# Patient Record
Sex: FEMALE | Race: WHITE | NOT HISPANIC OR LATINO | Employment: OTHER | URBAN - METROPOLITAN AREA
[De-identification: names, ages, dates, MRNs, and addresses within clinical notes are randomized per-mention and may not be internally consistent; named-entity substitution may affect disease eponyms.]

---

## 2017-01-10 ENCOUNTER — ALLSCRIPTS OFFICE VISIT (OUTPATIENT)
Dept: OTHER | Facility: OTHER | Age: 72
End: 2017-01-10

## 2017-01-10 DIAGNOSIS — E04.2 NONTOXIC MULTINODULAR GOITER: ICD-10-CM

## 2017-01-10 DIAGNOSIS — E78.5 HYPERLIPIDEMIA: ICD-10-CM

## 2017-01-10 DIAGNOSIS — Z12.31 ENCOUNTER FOR SCREENING MAMMOGRAM FOR MALIGNANT NEOPLASM OF BREAST: ICD-10-CM

## 2017-01-10 DIAGNOSIS — I10 ESSENTIAL (PRIMARY) HYPERTENSION: ICD-10-CM

## 2017-01-10 DIAGNOSIS — E87.6 HYPOKALEMIA: ICD-10-CM

## 2017-01-24 ENCOUNTER — APPOINTMENT (OUTPATIENT)
Dept: LAB | Age: 72
End: 2017-01-24
Payer: COMMERCIAL

## 2017-01-24 ENCOUNTER — HOSPITAL ENCOUNTER (OUTPATIENT)
Dept: RADIOLOGY | Age: 72
Discharge: HOME | End: 2017-01-24
Payer: COMMERCIAL

## 2017-01-24 ENCOUNTER — TRANSCRIBE ORDERS (OUTPATIENT)
Dept: ADMINISTRATIVE | Age: 72
End: 2017-01-24

## 2017-01-24 ENCOUNTER — GENERIC CONVERSION - ENCOUNTER (OUTPATIENT)
Dept: OTHER | Facility: OTHER | Age: 72
End: 2017-01-24

## 2017-01-24 DIAGNOSIS — E87.6 HYPOKALEMIA: ICD-10-CM

## 2017-01-24 DIAGNOSIS — Z12.31 ENCOUNTER FOR SCREENING MAMMOGRAM FOR MALIGNANT NEOPLASM OF BREAST: ICD-10-CM

## 2017-01-24 DIAGNOSIS — E04.2 NONTOXIC MULTINODULAR GOITER: ICD-10-CM

## 2017-01-24 DIAGNOSIS — E78.5 HYPERLIPIDEMIA: ICD-10-CM

## 2017-01-24 DIAGNOSIS — I10 ESSENTIAL (PRIMARY) HYPERTENSION: ICD-10-CM

## 2017-01-24 LAB
ALBUMIN SERPL BCP-MCNC: 3.7 G/DL (ref 3.5–5)
ALP SERPL-CCNC: 61 U/L (ref 46–116)
ALT SERPL W P-5'-P-CCNC: 26 U/L (ref 12–78)
ANION GAP SERPL CALCULATED.3IONS-SCNC: 10 MMOL/L (ref 4–13)
AST SERPL W P-5'-P-CCNC: 17 U/L (ref 5–45)
BILIRUB SERPL-MCNC: 0.45 MG/DL (ref 0.2–1)
BUN SERPL-MCNC: 17 MG/DL (ref 5–25)
CALCIUM SERPL-MCNC: 9.1 MG/DL (ref 8.3–10.1)
CHLORIDE SERPL-SCNC: 104 MMOL/L (ref 100–108)
CHOLEST SERPL-MCNC: 193 MG/DL (ref 50–200)
CO2 SERPL-SCNC: 26 MMOL/L (ref 21–32)
CREAT SERPL-MCNC: 1.22 MG/DL (ref 0.6–1.3)
GFR SERPL CREATININE-BSD FRML MDRD: 43.3 ML/MIN/1.73SQ M
GLUCOSE SERPL-MCNC: 91 MG/DL (ref 65–140)
HDLC SERPL-MCNC: 68 MG/DL (ref 40–60)
LDLC SERPL CALC-MCNC: 107 MG/DL (ref 0–100)
POTASSIUM SERPL-SCNC: 3.4 MMOL/L (ref 3.5–5.3)
PROT SERPL-MCNC: 7.1 G/DL (ref 6.4–8.2)
SODIUM SERPL-SCNC: 140 MMOL/L (ref 136–145)
TRIGL SERPL-MCNC: 92 MG/DL
TSH SERPL DL<=0.05 MIU/L-ACNC: 1.23 UIU/ML (ref 0.36–3.74)

## 2017-01-24 PROCEDURE — 80053 COMPREHEN METABOLIC PANEL: CPT

## 2017-01-24 PROCEDURE — 84443 ASSAY THYROID STIM HORMONE: CPT

## 2017-01-24 PROCEDURE — 36415 COLL VENOUS BLD VENIPUNCTURE: CPT

## 2017-01-24 PROCEDURE — 80061 LIPID PANEL: CPT

## 2017-01-24 PROCEDURE — G0202 SCR MAMMO BI INCL CAD: HCPCS

## 2017-03-21 ENCOUNTER — APPOINTMENT (EMERGENCY)
Dept: RADIOLOGY | Facility: HOSPITAL | Age: 72
End: 2017-03-21
Payer: COMMERCIAL

## 2017-03-21 ENCOUNTER — HOSPITAL ENCOUNTER (EMERGENCY)
Facility: HOSPITAL | Age: 72
Discharge: HOME | End: 2017-03-21
Attending: EMERGENCY MEDICINE
Payer: COMMERCIAL

## 2017-03-21 VITALS
RESPIRATION RATE: 18 BRPM | TEMPERATURE: 98.2 F | SYSTOLIC BLOOD PRESSURE: 145 MMHG | HEIGHT: 65 IN | OXYGEN SATURATION: 96 % | HEART RATE: 76 BPM | DIASTOLIC BLOOD PRESSURE: 65 MMHG | BODY MASS INDEX: 25.83 KG/M2 | WEIGHT: 155 LBS

## 2017-03-21 DIAGNOSIS — M71.21 BAKER'S CYST, RIGHT: Primary | ICD-10-CM

## 2017-03-21 DIAGNOSIS — M79.89 SWELLING OF RIGHT LOWER EXTREMITY: ICD-10-CM

## 2017-03-21 LAB
ANION GAP SERPL CALCULATED.3IONS-SCNC: 11 MMOL/L (ref 4–13)
APTT PPP: 27 SECONDS (ref 24–36)
BASOPHILS # BLD AUTO: 0.03 THOUSANDS/ΜL (ref 0–0.1)
BASOPHILS NFR BLD AUTO: 0 % (ref 0–1)
BUN SERPL-MCNC: 20 MG/DL (ref 5–25)
CALCIUM SERPL-MCNC: 10 MG/DL (ref 8.3–10.1)
CHLORIDE SERPL-SCNC: 106 MMOL/L (ref 100–108)
CO2 SERPL-SCNC: 24 MMOL/L (ref 21–32)
CREAT SERPL-MCNC: 1.11 MG/DL (ref 0.6–1.3)
EOSINOPHIL # BLD AUTO: 0.3 THOUSAND/ΜL (ref 0–0.61)
EOSINOPHIL NFR BLD AUTO: 3 % (ref 0–6)
ERYTHROCYTE [DISTWIDTH] IN BLOOD BY AUTOMATED COUNT: 14.3 % (ref 11.6–15.1)
GFR SERPL CREATININE-BSD FRML MDRD: 48.2 ML/MIN/1.73SQ M
GLUCOSE SERPL-MCNC: 101 MG/DL (ref 65–140)
HCT VFR BLD AUTO: 43.7 % (ref 34.8–46.1)
HGB BLD-MCNC: 14.7 G/DL (ref 11.5–15.4)
INR PPP: 0.88 (ref 0.86–1.16)
LYMPHOCYTES # BLD AUTO: 2.51 THOUSANDS/ΜL (ref 0.6–4.47)
LYMPHOCYTES NFR BLD AUTO: 28 % (ref 14–44)
MCH RBC QN AUTO: 27.4 PG (ref 26.8–34.3)
MCHC RBC AUTO-ENTMCNC: 33.6 G/DL (ref 31.4–37.4)
MCV RBC AUTO: 82 FL (ref 82–98)
MONOCYTES # BLD AUTO: 0.54 THOUSAND/ΜL (ref 0.17–1.22)
MONOCYTES NFR BLD AUTO: 6 % (ref 4–12)
NEUTROPHILS # BLD AUTO: 5.64 THOUSANDS/ΜL (ref 1.85–7.62)
NEUTS SEG NFR BLD AUTO: 63 % (ref 43–75)
NRBC BLD AUTO-RTO: 0 /100 WBCS
PLATELET # BLD AUTO: 222 THOUSANDS/UL (ref 149–390)
PMV BLD AUTO: 12.5 FL (ref 8.9–12.7)
POTASSIUM SERPL-SCNC: 4.3 MMOL/L (ref 3.5–5.3)
PROTHROMBIN TIME: 12.1 SECONDS (ref 12–14.3)
RBC # BLD AUTO: 5.36 MILLION/UL (ref 3.81–5.12)
SODIUM SERPL-SCNC: 141 MMOL/L (ref 136–145)
WBC # BLD AUTO: 9.05 THOUSAND/UL (ref 4.31–10.16)

## 2017-03-21 PROCEDURE — 99284 EMERGENCY DEPT VISIT MOD MDM: CPT

## 2017-03-21 PROCEDURE — 36415 COLL VENOUS BLD VENIPUNCTURE: CPT | Performed by: EMERGENCY MEDICINE

## 2017-03-21 PROCEDURE — 80048 BASIC METABOLIC PNL TOTAL CA: CPT | Performed by: EMERGENCY MEDICINE

## 2017-03-21 PROCEDURE — 85730 THROMBOPLASTIN TIME PARTIAL: CPT | Performed by: EMERGENCY MEDICINE

## 2017-03-21 PROCEDURE — 85610 PROTHROMBIN TIME: CPT | Performed by: EMERGENCY MEDICINE

## 2017-03-21 PROCEDURE — 93971 EXTREMITY STUDY: CPT

## 2017-03-21 PROCEDURE — 85025 COMPLETE CBC W/AUTO DIFF WBC: CPT | Performed by: EMERGENCY MEDICINE

## 2017-03-21 RX ORDER — LOSARTAN POTASSIUM AND HYDROCHLOROTHIAZIDE 25; 100 MG/1; MG/1
1 TABLET ORAL DAILY
COMMUNITY
End: 2018-10-20 | Stop reason: SDUPTHER

## 2017-03-21 RX ORDER — MONTELUKAST SODIUM 10 MG/1
10 TABLET ORAL
COMMUNITY
End: 2018-08-18 | Stop reason: SDUPTHER

## 2017-03-21 RX ORDER — AMLODIPINE BESYLATE 5 MG/1
5 TABLET ORAL DAILY
COMMUNITY
End: 2018-06-13

## 2017-03-21 RX ORDER — MOMETASONE FUROATE 50 UG/1
2 SPRAY, METERED NASAL DAILY
COMMUNITY
End: 2018-06-12 | Stop reason: SDUPTHER

## 2017-03-21 RX ORDER — PRAVASTATIN SODIUM 40 MG
40 TABLET ORAL DAILY
COMMUNITY
End: 2018-02-15 | Stop reason: SDUPTHER

## 2017-07-20 ENCOUNTER — ALLSCRIPTS OFFICE VISIT (OUTPATIENT)
Dept: OTHER | Facility: OTHER | Age: 72
End: 2017-07-20

## 2017-07-20 DIAGNOSIS — Z12.31 ENCOUNTER FOR SCREENING MAMMOGRAM FOR MALIGNANT NEOPLASM OF BREAST: ICD-10-CM

## 2017-08-17 ENCOUNTER — LAB REQUISITION (OUTPATIENT)
Dept: LAB | Facility: HOSPITAL | Age: 72
End: 2017-08-17
Payer: COMMERCIAL

## 2017-08-17 ENCOUNTER — GENERIC CONVERSION - ENCOUNTER (OUTPATIENT)
Dept: OTHER | Facility: OTHER | Age: 72
End: 2017-08-17

## 2017-08-17 DIAGNOSIS — C44.712 BASAL CELL CARCINOMA OF SKIN OF RIGHT LOWER LIMB, INCLUDING HIP: ICD-10-CM

## 2017-08-17 PROCEDURE — 88305 TISSUE EXAM BY PATHOLOGIST: CPT | Performed by: DERMATOLOGY

## 2017-10-04 ENCOUNTER — OFFICE VISIT (OUTPATIENT)
Dept: URGENT CARE | Age: 72
End: 2017-10-04
Payer: COMMERCIAL

## 2017-10-04 PROCEDURE — S9088 SERVICES PROVIDED IN URGENT: HCPCS | Performed by: FAMILY MEDICINE

## 2017-10-04 PROCEDURE — 99213 OFFICE O/P EST LOW 20 MIN: CPT | Performed by: FAMILY MEDICINE

## 2018-01-01 DIAGNOSIS — E04.2 NONTOXIC MULTINODULAR GOITER: ICD-10-CM

## 2018-01-01 DIAGNOSIS — E87.6 HYPOKALEMIA: ICD-10-CM

## 2018-01-01 DIAGNOSIS — I10 ESSENTIAL (PRIMARY) HYPERTENSION: ICD-10-CM

## 2018-01-01 DIAGNOSIS — E78.5 HYPERLIPIDEMIA: ICD-10-CM

## 2018-01-12 VITALS
WEIGHT: 157.01 LBS | BODY MASS INDEX: 26.81 KG/M2 | HEIGHT: 64 IN | RESPIRATION RATE: 18 BRPM | TEMPERATURE: 98.5 F | OXYGEN SATURATION: 98 % | DIASTOLIC BLOOD PRESSURE: 70 MMHG | SYSTOLIC BLOOD PRESSURE: 140 MMHG | HEART RATE: 70 BPM

## 2018-01-12 NOTE — RESULT NOTES
Message   Please call her that her potassium is slightly low as it has been in the past  The HCTZ she takes for her BP is what is doing this so I would like her to start potassium supplements  I will send it to the pharmacy  The rest of the blood tests looks good  Verified Results  (1) CBC/ PLT (NO DIFF) [id number] 08:18AM Joshua Pat     Test Name Result Flag Reference   HEMATOCRIT 41 8 %  34 8-46 1   HEMOGLOBIN 13 8 g/dL  11 5-15 4   MCHC 33 0 g/dL  31 4-37 4   MCH 26 9 pg  26 8-34 3   MCV 82 fL  82-98   PLATELET COUNT 543 Thousands/uL  149-390   RBC COUNT 5 13 Million/uL H 3 81-5 12   RDW 14 4 %  11 6-15 1   WBC COUNT 5 76 Thousand/uL  4 31-10 16   MPV 12 7 fL  8 9-12 7     (1) COMPREHENSIVE METABOLIC PANEL [id number] 93:41LS Joshua Pat     Test Name Result Flag Reference   GLUCOSE,RANDM 89 mg/dL     If the patient is fasting, the ADA then defines impaired fasting glucose as > 100 mg/dL and diabetes as > or equal to 123 mg/dL  SODIUM 139 mmol/L  136-145   POTASSIUM 3 4 mmol/L L 3 5-5 3   CHLORIDE 107 mmol/L  100-108   CARBON DIOXIDE 27 mmol/L  21-32   ANION GAP (CALC) 5 mmol/L  4-13   BLOOD UREA NITROGEN 18 mg/dL  5-25   CREATININE 1 09 mg/dL  0 60-1 30   Standardized to IDMS reference method   CALCIUM 9 2 mg/dL  8 3-10 1   BILI, TOTAL 0 66 mg/dL  0 20-1 00   ALK PHOSPHATAS 61 U/L     ALT (SGPT) 27 U/L  12-78   AST(SGOT) 19 U/L  5-45   ALBUMIN 3 7 g/dL  3 5-5 0   TOTAL PROTEIN 7 1 g/dL  6 4-8 2   eGFR Non-African American 49 3 ml/min/1 73sq m     [facility] [employer] Disease Education Program recommendations are as follows:  GFR calculation is accurate only with a steady state creatinine  Chronic Kidney disease less than 60 ml/min/1 73 sq  meters  Kidney failure less than 15 ml/min/1 73 sq  meters       (1) LIPID PANEL, FASTING 28Jun2016 08:18AM Joshua Pat     Test Name Result Flag Reference   CHOLESTEROL 191 mg/dL     HDL,DIRECT 60 mg/dL  40-60   Specimen collection should occur prior to Metamizole administration due to the potential for falsely depressed results  LDL CHOLESTEROL CALCULATED 113 mg/dL H 0-100   Triglyceride:         Normal              <150 mg/dl       Borderline High    150-199 mg/dl       High               200-499 mg/dl       Very High          >499 mg/dl  Cholesterol:         Desirable        <200 mg/dl      Borderline High  200-239 mg/dl      High             >239 mg/dl  HDL Cholesterol:        High    >59 mg/dL      Low     <41 mg/dL  LDL CALCULATED:    This screening LDL is a calculated result  It does not have the accuracy of the Direct Measured LDL in the monitoring of patients with hyperlipidemia and/or statin therapy  Direct Measure LDL (AAJ359) must be ordered separately in these patients  TRIGLYCERIDES 92 mg/dL  <=150   Specimen collection should occur prior to N-Acetylcysteine or Metamizole administration due to the potential for falsely depressed results         Plan  Hypokalemia    · Potassium Chloride ER 10 MEQ Oral Capsule Extended Release; TAKE 1  CAPSULE DAILY    Signatures   Electronically signed by : RUBIN Mcarthur ; Jun 30 2016  1:45PM EST                       (Author)

## 2018-01-13 ENCOUNTER — OFFICE VISIT (OUTPATIENT)
Dept: URGENT CARE | Age: 73
End: 2018-01-13
Payer: COMMERCIAL

## 2018-01-13 PROCEDURE — S9088 SERVICES PROVIDED IN URGENT: HCPCS | Performed by: PREVENTIVE MEDICINE

## 2018-01-13 PROCEDURE — 99213 OFFICE O/P EST LOW 20 MIN: CPT | Performed by: PREVENTIVE MEDICINE

## 2018-01-14 NOTE — PROGRESS NOTES
Assessment    1  Encounter for preventive health examination (V70 0) (Z00 00)    Plan  Benign essential hypertension, Hyperlipidemia, Hypokalemia, Non-toxic multinodular  goiter    · (1) CBC/PLT/DIFF; Status:Active; Requested ZIW:[id number];    · (1) COMPREHENSIVE METABOLIC PANEL; Status:Active; Requested QVL:60FPD1231;    · (1) LIPID PANEL FASTING W DIRECT LDL REFLEX; Status:Active; Requested  NYM:[id number];    · (1) TSH WITH FT4 REFLEX; Status:Active; Requested YSE:[id number]; Discussion/Summary  Impression: Subsequent Annual Wellness Visit, with preventive exam as well as age and risk appropriate counseling completed  Cardiovascular screening and counseling: screening is current  Diabetes screening and counseling: screening is current  Colorectal cancer screening and counseling: screening is current, due for a colonoscopy (low risk), colorectal cancer screening due every 10 year(s) and due 2019  Breast cancer screening and counseling: screening is current  Cervical cancer screening and counseling: screening not indicated  Osteoporosis screening and counseling: the risks and benefits of screening were discussed and the patient declines screening  Abdominal aortic aneurysm screening and counseling: screening not indicated  Glaucoma screening and counseling: screening is current  HIV screening and counseling: screening not indicated  Immunizations: the lifetime pneumococcal vaccine has been completed and Zostavax vaccination up to date  Advance Directive Planning: complete and up to date  Patient Discussion: plan discussed with the patient, follow-up visit needed in 6months  The treatment plan was reviewed with the patient/guardian  The patient/guardian understands and agrees with the treatment plan      Chief Complaint  Wellness      History of Present Illness  Welcome to Medicare and Wellness Visits: The patient is being seen for the subsequent annual wellness visit     Medicare Screening and Risk Factors   Hospitalizations: no previous hospitalizations  Once per lifetime medicare screening tests: ECG has not been done  Medicare Screening Tests Risk Questions   Abdominal aortic aneurysm risk assessment: none indicated  Osteoporosis risk assessment: , female gender and over 48years of age  HIV risk assessment: none indicated  Drug and Alcohol Use: The patient has never smoked cigarettes  The patient reports occasional alcohol use and drinking 3-4 drinks per week  Alcohol concern:   The patient has no concerns about alcohol abuse  She has never used illicit drugs  Diet and Physical Activity: Current diet includes well balanced meals  She exercises 2-3 times per week  Exercise: Pilates, treadmill 45 minutes per day  Mood Disorder and Cognitive Impairment Screening:   Depression screening  negative for symptoms  She denies feeling down, depressed, or hopeless over the past two weeks  She denies feeling little interest or pleasure in doing things over the past two weeks  Cognitive impairment screening: denies difficulty learning/retaining new information, denies difficulty handling complex tasks, denies difficulty with reasoning, denies difficulty with language and denies difficulty with behavior  Functional Ability/Level of Safety: Hearing is normal bilaterally and a hearing aid is not used  She denies hearing difficulties  The patient is currently able to do activities of daily living without limitations, able to do instrumental activities of daily living without limitations, able to participate in social activities without limitations and able to drive without limitations   Activities of daily living details: does not need help using the phone, no transportation help needed, does not need help shopping, no meal preparation help needed, does not need help doing housework, does not need help doing laundry, does not need help managing medications and does not need help managing money  Fall risk factors: The patient fell 0 times in the past 12 months , no polypharmacy, no alcohol use, no mobility impairment, no antidepressant use, no deconditioning, no postural hypotension, no sedative use, no visual impairment, no urinary incontinence, no antihypertensive use, no cognitive impairment, up and go test was normal and no previous fall  Home safety risk factors:  no loose rugs, no poor household lighting, no uneven floors, no household clutter, grab bars in the bathroom and handrails on the stairs  Advance Directives: Advance directives: living will, durable power of  for health care directives and advance directives  Co-Managers and Medical Equipment/Suppliers: See Patient Care Team      Patient Care Team    Care Team Member Role Specialty Office Number   Jake Luque MD  Surgical Oncology [phone]   Jodi Reyez MD  Pediatrics [phone]   Radha Sweet  Gastroenterology Adult [phone]   Roby Sanches MD  Optometry [phone]     Review of Systems    Constitutional: no fever and no chills  ENT: post nasal drip  Cardiovascular: no chest pain and no palpitations  Respiratory: no shortness of breath and no cough  Gastrointestinal: no abdominal pain, no vomiting and no diarrhea  Genitourinary: denies vaginal bleeding, but no dysuria and no urinary urgency  Musculoskeletal: no diffuse joint pain  Psychiatric: no insomnia, no anxiety and no depression  Active Problems    1  Advance directive discussed with patient (V65 49) (Z71 89)   2  Benign essential hypertension (401 1) (I10)   3  Eye problem (V41 1) (H57 9)   4  Flu vaccine need (V04 81) (Z23)   5  History of allergy (V15 09) (Z88 9)   6  Hyperlipidemia (272 4) (E78 5)   7  Hypokalemia (276 8) (E87 6)   8  Mild persistent asthma without complication (396 09) (S38 36)   9   Need for prophylactic vaccination or inoculation against diphtheria and tetanus (V06 5) (Z23)   10  Need for revaccination (V05 9) (Z23)   11  Non-toxic multinodular goiter (241 1) (E04 2)   12  Screening for genitourinary condition (V81 6) (Z13 89)   13  Urine frequency (788 41) (R35 0)   14  Visit for screening mammogram (V76 12) (Z12 31)    Past Medical History    · History of acute conjunctivitis (V12 49) (Z86 69)   · History of allergic rhinitis (V12 69) (Z87 09)   · History of hypokalemia (V12 29) (Z86 39)   · History of urinary tract infection (V13 02) (Z87 440)   · History of urinary tract infection (V13 02) (Z87 440)   · History of Screening for neurological condition (V80 09) (Z13 89)   · Urinary tract infection (599 0) (N39 0)   · History of Urinary tract infection (599 0) (N39 0)   · History of Urine troubles (V47 4) (R39 89)    The active problems and past medical history were reviewed and updated today  Surgical History    · History of Colposcopy   · History of Laparoscopy (Diagnostic)    The surgical history was reviewed and updated today  Family History  Mother    · Family history of Lung Cancer (V16 1)  Family History    · Family history of Asthma (V17 5)   · Family history of Coronary Artery Disease (V17 49)   · Family history of Thyroid Disorder (V18 19)    The family history was reviewed and updated today  Social History    · Being A Social Drinker   · Denied: History of Drug Use   · Former smoker (V15 82) (T30 860)   · Viru 64   · Marital History - Currently   The social history was reviewed and updated today  Current Meds   1  Advair Diskus 100-50 MCG/DOSE Inhalation Aerosol Powder Breath Activated; INHALE   1 PUFF DAILY; Therapy: 84NJW8241 to (Evaluate:10Nov2017)  Requested for: 08KVN4006; Last   Rx:61Gef6692 Ordered   2  AmLODIPine Besylate 5 MG Oral Tablet; take 1 tablet every day; Therapy: 42MUB9212 to (Evaluate:10Jan2018)  Requested for: 06FAZ8745; Last   Rx:15Jan2017 Ordered   3  Calcium 600+D 600-400 MG-UNIT Oral Tablet;  Take one tablet daily; Therapy: (Recorded:11Uyi4150) to Recorded   4  Claritin 10 MG Oral Tablet; take 1 tablet daily as needed  Requested for: 19VMQ1316;   Last Rx:26Mam8149 Ordered   5  Fluticasone Propionate 50 MCG/ACT Nasal Suspension; use 2 sprays in each nostril   once daily; Therapy: 59CKQ6408 to (Evaluate:14Jan2018)  Requested for: 60Bdq0708; Last   Rx:52Ncj9192 Ordered   6  Losartan Potassium-HCTZ 100-25 MG Oral Tablet; TAKE 1 TABLET DAILY; Therapy: 90UAH4776 to (Oscar Oneill)  Requested for: 53HGF2375; Last   Rx:57Czv1354 Ordered   7  Montelukast Sodium 10 MG Oral Tablet; TAKE 1 TABLET AT BEDTIME; Therapy: 84YII7256 to (Evaluate:04Sep2017)  Requested for: 96HQW8355; Last   Rx:25Inf9554 Ordered   8  Potassium Chloride ER 20 MEQ Oral Tablet Extended Release; 1 tab PO once  daily; Therapy: 48QZJ8595 to (Last PC:69BOZ5020)  Requested for: 74NRB6046 Ordered   9  Pravastatin Sodium 40 MG Oral Tablet; take 1 tablet every day; Therapy: 96JEN2550 to (Evaluate:27Apr2018)  Requested for: 66WMZ8710; Last   Rx:79Sxw7409 Ordered   10  Tobramycin 0 3 % Ophthalmic Solution; instill three drops tid for five days; Therapy: 88ACT3863 to (Last Rx:68Rck4085)  Requested for: 45PXJ1718 Ordered    The medication list was reviewed and updated today  Allergies    1  Sulfa Drugs    2  Seasonal    Immunizations   ** Printed in Appendix #1 below  Vitals  Signs    Systolic: 829  Diastolic: 78   Heart Rate: 76  Systolic: 654  Diastolic: 64  Height: 5 ft 4 in  Weight: 158 lb   BMI Calculated: 27 12  BSA Calculated: 1 77  O2 Saturation: 96    Physical Exam    Constitutional   General appearance: No acute distress, well appearing and well nourished  Head and Face   Head and face: Normal     Eyes   Conjunctiva and lids: No swelling, erythema or discharge  Ears, Nose, Mouth, and Throat   Otoscopic examination: Tympanic membranes translucent with normal light reflex  Canals patent without erythema      Oropharynx: Normal with no erythema, edema, exudate or lesions  Neck   Neck: Supple, symmetric, trachea midline, no masses  Thyroid: Normal, no thyromegaly  Pulmonary   Respiratory effort: No increased work of breathing or signs of respiratory distress  Auscultation of lungs: Clear to auscultation  Cardiovascular   Auscultation of heart: Normal rate and rhythm, normal S1 and S2, no murmurs  Examination of extremities for edema and/or varicosities: Normal     Abdomen   Abdomen: Non-tender, no masses  Liver and spleen: No hepatomegaly or splenomegaly  Lymphatic   Palpation of lymph nodes in neck: No lymphadenopathy      Musculoskeletal   Gait and station: Normal     Psychiatric   Orientation to person, place, and time: Normal     Mood and affect: Normal        Signatures   Electronically signed by : RUBIN Strickland ; 2017 10:03AM EST                       (Author)    Appendix #1     Patient: Gloria Rao ; : 1945; MRN: [id number]      1 2 3 4    Influenza  Temporarily Deferred: Patient reports item recently done, did not receive high dose in the hospital 17-Oct-2008  ([address] [place] [address]) Oct 2014  (69y)     PCV  18-Dec-2014  (69y)       PPSV  2010  (65y)       Td/DT  Invalid Date       Tdap  03-Dec-2015  (70y) 10-Gavin-2017  (71y)      Zoster  11-Sep-2006  (61y) 2009

## 2018-01-14 NOTE — PROGRESS NOTES
Assessment    1  Encounter for preventive health examination (V70 0) (Z00 00)   2  Screening for genitourinary condition (V81 6) (Z13 89)   3  Former smoker (V15 82) (F91 376)   · [address]  Benign essential hypertension, Hyperlipidemia    · (1) CBC/ PLT (NO DIFF); Status:Active; Requested DAZ:13ATZ3548;    · (1) COMPREHENSIVE METABOLIC PANEL; Status:Active; Requested VYL:38DJC5381;    · (1) LIPID PANEL, FASTING; Status:Active; Requested ABZ:88BUE4038;    · (1) URINALYSIS (will reflex a microscopy if leukocytes, occult blood, protein or nitrites  are not within normal limits); Status:Active; Requested WTI:57WVD4047;   Screening for genitourinary condition    · *VB-Urinary Incontinence Screen (Dx V81 6 Screen for UI); Status:Complete -  Retrospective By Protocol Authorization;   Done: 04KEJ2582 10:32AM    Discussion/Summary  Impression: Subsequent Annual Wellness Visit  Diabetes screening and counseling: screening is current  Colorectal cancer screening and counseling: screening is current  Breast cancer screening and counseling: screening is current  Cervical cancer screening and counseling: screening not indicated  Osteoporosis screening and counseling: screening is current, counseling was given on obtaining adequate amounts of calcium and vitamin D on a daily basis and counseling was given on the importance of regular weightbearing exercise  Glaucoma screening and counseling: screening not indicated  HIV screening and counseling: counseling was given on ways to prevent HIV infection  Immunizations: influenza vaccine is up to date this year, the lifetime pneumococcal vaccine has been completed, Zostavax vaccination up to date and Tdap vaccination up to date  Advance Directive Planning: she was encouraged to follow-up with me to discuss her questions and/or decisions  Patient Discussion: plan discussed with the patient, follow-up visit needed in 6months        Chief Complaint  Patient presents to office today for a Medicare Wellness  History of Present Illness  Welcome to Medicare and Wellness Visits: The patient is being seen for the subsequent annual wellness visit  Medicare Screening and Risk Factors   Hospitalizations: no previous hospitalizations  Once per lifetime medicare screening tests: ECG has not been done  Medicare Screening Tests Risk Questions   Abdominal aortic aneurysm risk assessment: none indicated  Osteoporosis risk assessment:  and female gender  HIV risk assessment: none indicated  Drug and Alcohol Use: The patient is a former cigarette smoker  The patient reports occasional alcohol use  Diet and Physical Activity: Current diet includes well balanced meals and limited junk food  She exercises >3 times per week  Exercise: walking, strength training, Pilates once a week for an hour  Mood Disorder and Cognitive Impairment Screening:   Depression screening  negative for symptoms  She denies feeling down, depressed, or hopeless over the past two weeks  She denies feeling little interest or pleasure in doing things over the past two weeks  Cognitive impairment screening: denies difficulty learning/retaining new information, denies difficulty handling complex tasks, denies difficulty with reasoning, denies difficulty with spatial ability and orientation, denies difficulty with language and denies difficulty with behavior  Functional Ability/Level of Safety: Hearing is normal bilaterally and a hearing aid is not used  She denies hearing difficulties  The patient is currently able to do activities of daily living without limitations, able to do instrumental activities of daily living without limitations, able to participate in social activities without limitations and able to drive without limitations   Activities of daily living details: does not need help using the phone, no transportation help needed, does not need help shopping, no meal preparation help needed, does not need help doing housework, does not need help doing laundry and does not need help managing money  Fall risk factors:  alcohol use and antihypertensive use, but The patient fell 0 times in the past 12 months , no polypharmacy, no mobility impairment, no antidepressant use, no deconditioning, no postural hypotension, no sedative use, no visual impairment, no urinary incontinence, no cognitive impairment, up and go test was normal and no previous fall  Home safety risk factors:  no grab bars in the bathroom, but no unfamiliar surroundings, no loose rugs, no poor household lighting, no uneven floors, no household clutter and handrails on the stairs  Advance Directives: Advance directives: living will and advance directives  Co-Managers and Medical Equipment/Suppliers: See Patient Care Team   Preventive Quality Program 65 and Older: Falls Risk: The patient fell 0 times in the past 12 months  The patient currently has no urinary incontinence symptoms  Patient Care Team    Care Team Member Role Specialty Office Number   Kelly Fischer MD  Surgical Oncology [phone]   [facility] MD  Pediatrics [phone]   Oralia Pierre  Gastroenterology Adult [phone]   Fredy Nicholson MD  Optometry [phone]     Review of Systems    Constitutional: no fever and no chills  ENT: no earache, no hearing loss and no sore throat  Cardiovascular: no chest pain and no palpitations  Respiratory: no shortness of breath    The patient presents with complaints of cough (from post nasal drip)  Gastrointestinal: no abdominal pain, no diarrhea and no constipation  Genitourinary: no dysuria  Musculoskeletal: no diffuse joint pain and no generalized muscle aches  Psychiatric: no anxiety and no depression  Active Problems    1  Asthma (493 90) (J35 059)   2  Benign essential hypertension (401 1) (I10)   3  Flu vaccine need (V04 81) (Z23)   4   History of allergy (V15 09) (Z88 9)   5  Hyperlipidemia (272 4) (E78 5)   6  Need for prophylactic vaccination or inoculation against diphtheria and tetanus (V06 5)   (Z23)   7  Non-toxic multinodular goiter (241 1) (E04 2)   8  Urine frequency (788 41) (R35 0)   9  Visit for screening mammogram (V76 12) (Z12 31)    Past Medical History    · History of acute conjunctivitis (V12 49) (Z86 69)   · History of allergic rhinitis (V12 69) (Z87 09)   · History of hypokalemia (V12 29) (Z86 39)   · History of urinary tract infection (V13 02) (Z87 440)   · History of urinary tract infection (V13 02) (Z87 440)   · History of Screening for neurological condition (V80 09) (Z13 89)   · Urinary tract infection (599 0) (N39 0)   · History of Urinary tract infection (599 0) (N39 0)   · History of Urine troubles (V47 4) (R39 89)    The active problems and past medical history were reviewed and updated today  Surgical History    · History of Colposcopy   · History of Laparoscopy (Diagnostic)    The surgical history was reviewed and updated today  Family History  Mother    · Family history of Lung Cancer (V16 1)  Family History    · Family history of Asthma (V17 5)   · Family history of Coronary Artery Disease (V17 49)   · Family history of Thyroid Disorder (V18 19)    The family history was reviewed and updated today  Social History    · Being A Social Drinker   · Denied: History of Drug Use   · Former smoker (V15 82) (Z46 851)   · Viru 64   · Marital History - Currently   The social history was reviewed and updated today  Current Meds   1  Advair Diskus 100-50 MCG/DOSE Inhalation Aerosol Powder Breath Activated; INHALE   1 PUFF DAILY; Therapy: 26DLB3219 to (Evaluate:77Wpz2400)  Requested for: 14WCF1337; Last   Rx:87Hko0550 Ordered   2  AmLODIPine Besylate 5 MG Oral Tablet; take 1 tablet every day; Therapy: 56SBN0585 to (Evaluate:16Jan2017)  Requested for: 21Apr2016; Last   Rx:21Apr2016 Ordered   3   Calcium 600+D 600-400 MG-UNIT Oral Tablet; Take one tablet daily; Therapy: (Recorded:97Gyb9475) to Recorded   4  Claritin 10 MG Oral Tablet; take 1 tablet daily as needed  Requested for: 25KGM5213;   Last Rx:08Ryf1554 Ordered   5  Fluticasone Propionate 50 MCG/ACT Nasal Suspension; use 2 sprays in each nostril   once daily; Therapy: 18VSI9446 to (Evaluate:18Nov2016)  Requested for: 95Pyh1307; Last   Rx:16Aml5831 Ordered   6  Losartan Potassium-HCTZ 100-25 MG Oral Tablet; TAKE 1 TABLET DAILY; Therapy: 49GEK2112 to (Evaluate:12Nov2016)  Requested for: 35BUX5916; Last   Rx:54Rvb4975 Ordered   7  Montelukast Sodium 10 MG Oral Tablet; take 1 tablet at bedtime; Therapy: 86URH4249 to (Last Rx:23Mar2016)  Requested for: 22BBK4763 Ordered   8  Pravastatin Sodium 40 MG Oral Tablet; TAKE 1 TABLET DAILY; Therapy: 02ASO7046 to (Evaluate:08Xyh4129)  Requested for: 25Jan2016; Last   Rx:25Jan2016 Ordered    The medication list was reviewed and updated today  Allergies    1  Sulfa Drugs    2  Seasonal    Immunizations   ** Printed in Appendix #1 below  Vitals  Signs [Data Includes: Current Encounter]    Heart Rate: 71  Systolic: 120, LUE, Sitting  Diastolic: 64, LUE, Sitting  Height: 5 ft 4 5 in  Weight: 156 lb 6 oz  BMI Calculated: 26 43  BSA Calculated: 1 77  O2 Saturation: 98    Physical Exam    Constitutional   General appearance: No acute distress, well appearing and well nourished  Head and Face   Head and face: Normal     Eyes   Conjunctiva and lids: No swelling, erythema or discharge  Ears, Nose, Mouth, and Throat   Otoscopic examination: Tympanic membranes translucent with normal light reflex  Canals patent without erythema  Oropharynx: Normal with no erythema, edema, exudate or lesions  Neck   Neck: Supple, symmetric, trachea midline, no masses  Thyroid: Normal, no thyromegaly  Pulmonary   Respiratory effort: No increased work of breathing or signs of respiratory distress      Auscultation of lungs: Clear to auscultation  Cardiovascular   Auscultation of heart: Normal rate and rhythm, normal S1 and S2, no murmurs  Abdomen   Abdomen: Non-tender, no masses  Liver and spleen: No hepatomegaly or splenomegaly  Lymphatic   Palpation of lymph nodes in neck: No lymphadenopathy  Musculoskeletal   Gait and station: Normal     Psychiatric   Orientation to person, place, and time: Normal     Mood and affect: Normal        Results/Data  *VB-Urinary Incontinence Screen (Dx V81 6 Screen for UI) 95WTZ4222 10:32AM Edson Lynch     Test Name Result Flag Reference   Urinary Incontinence Assessment 98RWS1126       Falls Risk Assessment (Dx V80 09 Screen for Neurologic Disorder) 04FQF2720 10:30AM User, Ahs     Test Name Result Flag Reference   Falls Risk      No falls in the past year     PHQ-2 Adult Depression Screening 2016 10:30AM User, Ahs     Test Name Result Flag Reference   PHQ-2 Adult Depression Score 0     Over the last two weeks, how often have you been bothered by any of the following problems?   Little interest or pleasure in doing things: Not at all - 0  Feeling down, depressed, or hopeless: Not at all - 0   PHQ-2 Adult Depression Screening Negative         Future Appointments    Date/Time Provider Specialty Site   2016 10:30 AM Virgen Tran MD Surgical Oncology [facility]     Signatures   Electronically signed by : RUBIN Cowan ; 2016 11:07AM EST                       (Author)    Appendix #1     Patient: Guille Sharma ; : 1945; MRN: [id number]      1 2 3 4    Influenza  Temporarily Deferred: Patient reports item recently done, did not receive high dose in the hospital 10/17/08 Oct 2014     PNEUMO (POLY)  6/16/10       Prevnar 13 Intramuscular Suspension  30UEH7119       Tdap  85GSB7625       Zoster  06

## 2018-01-16 NOTE — PROGRESS NOTES
Assessment   1  Acute conjunctivitis (372 00) (H10 30)    Plan   Acute conjunctivitis    · Tobramycin 0 3 % Ophthalmic Solution; INSTILL 1 DROP INTO AFFECTED EYE(S)    4 TIMES DAILY    Discussion/Summary   Discussion Summary:    Start eye drops  Warm compresses  Avoid rubbing eyes  Clean cloth each time you clean eye  Follow up with eye doctor if no improvement  Go to ER with worsening symptoms  Medication Side Effects Reviewed: Possible side effects of new medications were reviewed with the patient/guardian today  Understands and agrees with treatment plan: The treatment plan was reviewed with the patient/guardian  The patient/guardian understands and agrees with the treatment plan    Counseling Documentation With Imm: The patient was counseled regarding instructions for management,-- patient and family education,-- importance of compliance with treatment  Chief Complaint   1  Eye Discharge  Chief Complaint Free Text Note Form: Yesterday she started with her right eye being tearing and later on during the day her eye became red and sore - this am her right eye was very crusty and her left eye was also involved- vision with her glasses right 20/40 and left 20/25      History of Present Illness   HPI: This is a 67year old female here today red eye  Symptoms started yesterday  She states symptoms are greater in right eye than left  Eye has been tearing and has clear drainage  no fevers, no URI symptoms no known exposure  She has some general sensitivity of her eyes in the cold and wind  Active Problems   1  Advance directive discussed with patient (V65 49) (Z71 89)   2  Benign essential hypertension (401 1) (I10)   3  Eye problem (V41 1) (H57 9)   4  Flu vaccine need (V04 81) (Z23)   5  History of allergy (V15 09) (Z88 9)   6  Hyperlipidemia (272 4) (E78 5)   7  Hypokalemia (276 8) (E87 6)   8  Mild persistent asthma without complication (602 93) (N06 94)   9   Need for prophylactic vaccination and inoculation against influenza (V04 81) (Z23)   10  Need for prophylactic vaccination or inoculation against diphtheria and tetanus (V06 5)      (Z23)   11  Need for revaccination (V05 9) (Z23)   12  Non-toxic multinodular goiter (241 1) (E04 2)   13  Screening for genitourinary condition (V81 6) (Z13 89)   14  Skin cyst (706 2) (L72 9)   15  Urine frequency (788 41) (R35 0)   16  Visit for screening mammogram (V76 12) (Z12 31)    Past Medical History   1  History of acute conjunctivitis (V12 49) (Z86 69)   2  History of allergic rhinitis (V12 69) (Z87 09)   3  History of hypokalemia (V12 29) (Z86 39)   4  History of urinary tract infection (V13 02) (Z87 440)   5  History of urinary tract infection (V13 02) (Z87 440)   6  History of Screening for neurological condition (V80 09) (Z13 89)   7  Urinary tract infection (599 0) (N39 0)   8  History of Urinary tract infection (599 0) (N39 0)   9  History of Urine troubles (V47 4) (R39 89)  Active Problems And Past Medical History Reviewed: The active problems and past medical history were reviewed and updated today  Family History   Mother    1  Family history of Lung Cancer (V16 1)  Family History    2  Family history of Asthma (V17 5)   3  Family history of Coronary Artery Disease (V17 49)   4  Family history of Thyroid Disorder (V18 19)  Family History Reviewed: The family history was reviewed and updated today  Social History    · Being A Social Drinker   · Denied: History of Drug Use   · Former smoker (V15 82) (Z41 534)   · Marital History - Currently   Social History Reviewed: The social history was reviewed and updated today  The social history was reviewed and is unchanged  Surgical History   1  History of Colposcopy   2  History of Laparoscopy (Diagnostic)  Surgical History Reviewed: The surgical history was reviewed and updated today  Current Meds    1   Advair Diskus 100-50 MCG/DOSE Inhalation Aerosol Powder Breath Activated; INHALE 1     PUFF DAILY; Therapy: 00MKS0378 to (Evaluate:08Jfs7011)  Requested for: 47HFB9973; Last     Rx:01Nov2017 Ordered   2  AmLODIPine Besylate 5 MG Oral Tablet; take 1 tablet every day; Therapy: 24XSA2390 to (Evaluate:10Jan2018)  Requested for: 53VAY7803; Last     Rx:15Jan2017 Ordered   3  Calcium 600+D 600-400 MG-UNIT Oral Tablet; Take one tablet daily; Therapy: (Recorded:18Wxt2778) to Recorded   4  Claritin 10 MG Oral Tablet; take 1 tablet daily as needed  Requested for: 71KBG2939;     Last Rx:18Feb2016 Ordered   5  Fluticasone Propionate 50 MCG/ACT Nasal Suspension; use 2 sprays in each nostril     once daily; Therapy: 48TIY7442 to (Evaluate:14Jan2018)  Requested for: 19Apr2017; Last     Rx:19Apr2017 Ordered   6  Losartan Potassium-HCTZ 100-25 MG Oral Tablet; TAKE 1 TABLET DAILY; Therapy: 82IJY3357 to [phone])  Requested for: 10MYY1459; Last     Rx:01Nov2017 Ordered   7  Montelukast Sodium 10 MG Oral Tablet; TAKE 1 TABLET AT BEDTIME; Therapy: 55ILM3375 to (Evaluate:25Uvq9264)  Requested for: 53Zbi5638; Last     Rx:20Wyv6968 Ordered   8  Potassium Chloride ER 20 MEQ Oral Tablet Extended Release; 1 tab PO once  daily; Therapy: 75MYG9938 to (Evaluate:18Feb2018)  Requested for: 12Ktn5133; Last     Rx:27Rnj5265 Ordered   9  Pravastatin Sodium 40 MG Oral Tablet; take 1 tablet every day; Therapy: 93CSG3132 to (Evaluate:27Apr2018)  Requested for: 08DYA8701; Last     EL:27RMS5774 Ordered  Medication List Reviewed: The medication list was reviewed and updated today  Allergies   1  Sulfa Drugs  2  Seasonal    Vitals   Signs   Recorded: 54ILR3476 09:21AM   Temperature: 97 6 F  Heart Rate: 76  Respiration: 18  Systolic: 741  Diastolic: 72  Height: 5 ft 4 in  Weight: 158 lb   BMI Calculated: 27 12  BSA Calculated: 1 77  O2 Saturation: 98  Pain Scale: 6    Future Appointments      Date/Time Provider Specialty Site   02/01/2018 09:30 AM RUBIN Osorio   Internal Medicine [facility]     Signatures    Electronically signed by : Capri Millan; Jan 13 2018 10:07AM EST                       (Author)     Electronically signed by : Selwyn Campo DO; Gavin 15 2018  8:55AM EST                       (Co-author)

## 2018-01-16 NOTE — RESULT NOTES
Message   her blood tests look good but her potassium is still slightly low   I would like her to take 20mEq of the potassium supplement instead of 10   I will send a new script for this new dose        Verified Results  (1) COMPREHENSIVE METABOLIC PANEL 62SGN6830 91:91UB Shayne Mayo Order Number: [id number]     Test Name Result Flag Reference   GLUCOSE,RANDM 91 mg/dL     If the patient is fasting, the ADA then defines impaired fasting glucose as > 100 mg/dL and diabetes as > or equal to 123 mg/dL  SODIUM 140 mmol/L  136-145   POTASSIUM 3 4 mmol/L L 3 5-5 3   CHLORIDE 104 mmol/L  100-108   CARBON DIOXIDE 26 mmol/L  21-32   ANION GAP (CALC) 10 mmol/L  4-13   BLOOD UREA NITROGEN 17 mg/dL  5-25   CREATININE 1 22 mg/dL  0 60-1 30   Standardized to IDMS reference method   CALCIUM 9 1 mg/dL  8 3-10 1   BILI, TOTAL 0 45 mg/dL  0 20-1 00   ALK PHOSPHATAS 61 U/L     ALT (SGPT) 26 U/L  12-78   AST(SGOT) 17 U/L  5-45   ALBUMIN 3 7 g/dL  3 5-5 0   TOTAL PROTEIN 7 1 g/dL  6 4-8 2   eGFR Non-African American 43 3 ml/min/1 73sq m     - Patient Instructions: This is a fasting blood test  Water, black tea or black coffee only after 9:00pm the night before test Drink 2 glasses of water the morning of test   National Kidney Disease Education Program recommendations are as follows:  GFR calculation is accurate only with a steady state creatinine  Chronic Kidney disease less than 60 ml/min/1 73 sq  meters  Kidney failure less than 15 ml/min/1 73 sq  meters  (1) LIPID PANEL FASTING W DIRECT LDL REFLEX 96NFM9877 08:07AM Shanye Mayo Order Number: [id number]     Test Name Result Flag Reference   CHOLESTEROL 193 mg/dL     LDL CHOLESTEROL CALCULATED 107 mg/dL H 0-100   - Patient Instructions: This is a fasting blood test  Water, black tea or black coffee only after 9:00pm the night before test   Drink 2 glasses of water the morning of test     - Patient Instructions:  This is a fasting blood test  Water, black tea or black coffee only after 9:00pm the night before test Drink 2 glasses of water the morning of test   Triglyceride:         Normal              <150 mg/dl       Borderline High    150-199 mg/dl       High               200-499 mg/dl       Very High          >499 mg/dl  Cholesterol:         Desirable        <200 mg/dl      Borderline High  200-239 mg/dl      High             >239 mg/dl  HDL Cholesterol:        High    >59 mg/dL      Low     <41 mg/dL  LDL Cholesterol:        Optimal          <100 mg/dl        Near Optimal     100-129 mg/dl        Above Optimal          Borderline High   130-159 mg/dl          High              160-189 mg/dl          Very High        >189 mg/dl  LDL CALCULATED:    This screening LDL is a calculated result  It does not have the accuracy of the Direct Measured LDL in the monitoring of patients with hyperlipidemia and/or statin therapy  Direct Measure LDL (AGQ662) must be ordered separately in these patients  TRIGLYCERIDES 92 mg/dL  <=150   Specimen collection should occur prior to N-Acetylcysteine or Metamizole administration due to the potential for falsely depressed results  HDL,DIRECT 68 mg/dL H 40-60   Specimen collection should occur prior to Metamizole administration due to the potential for falsely depressed results  (1) TSH WITH FT4 REFLEX 04REL3700 08:07AM Grover Araujo Order Number: [id number]     Test Name Result Flag Reference   TSH 1 230 uIU/mL  0 358-3 740   - Patient Instructions: This is a fasting blood test  Water, black tea or black coffee only after 9:00pm the night before test Drink 2 glasses of water the morning of test   Patients undergoing fluorescein dye angiography may retain small amounts of fluorescein in the body for 48-72 hours post procedure  Samples containing fluorescein can produce falsely depressed TSH values   If the patient had this procedure,a specimen should be resubmitted post fluorescein clearance            The recommended reference ranges for TSH during pregnancy are as follows:  First trimester 0 1 to 2 5 uIU/mL  Second trimester  0 2 to 3 0 uIU/mL  Third trimester 0 3 to 3 0 uIU/m       Plan  Hypokalemia    · Potassium Chloride ER 10 MEQ Oral Capsule Extended Release   · Potassium Chloride ER 20 MEQ Oral Tablet Extended Release; 1 tab PO once   daily    Signatures   Electronically signed by : RUBIN Jefferson ; Jan 24 2017 12:57PM EST                       (Author)

## 2018-01-18 NOTE — PROGRESS NOTES
Assessment    1  Skin cyst (706 2) (L72 9)    Discussion/Summary  Discussion Summary:   As we discussed, this appears to be cyst  No signs of infection  I would recommend follow up with PCP for further evaluation  Medication Side Effects Reviewed: Possible side effects of new medications were reviewed with the patient/guardian today  Understands and agrees with treatment plan: The treatment plan was reviewed with the patient/guardian  The patient/guardian understands and agrees with the treatment plan   Counseling Documentation With Imm: The patient was counseled regarding instructions for management, patient and family education, importance of compliance with treatment  Chief Complaint    1  Skin Lesions  Chief Complaint Free Text Note Form: c/o hard mass to middle of back x 3 weeks,      History of Present Illness  HPI: THis is a 67year old female here today with lump on mid left back  She noticed this about 3 weeks ago  At that time she had several bug bites and that is what she thought it was  There is no redness, no fevers, body aches or chills  Hospital Based Practices Required Assessment:   Pain Assessment   the patient states they do not have pain  Abuse And Domestic Violence Screen    Yes, the patient is safe at home  The patient states no one is hurting them  Depression And Suicide Screen  No, the patient has not had thoughts of hurting themself  No, the patient has not felt depressed in the past 7 days  Prefered Language is  enlgish  Review of Systems  Focused-Female:   Constitutional: No fever, no chills, feels well, no tiredness, no recent weight gain or loss  Cardiovascular: no complaints of slow or fast heart rate, no chest pain, no palpitations, no leg claudication or lower extremity edema  Respiratory: no complaints of shortness of breath, no wheezing, no dyspnea on exertion, no orthopnea or PND  Integumentary: as noted in HPI  ROS Reviewed:   ROS reviewed  Active Problems    1  Advance directive discussed with patient (V65 49) (Z71 89)   2  Benign essential hypertension (401 1) (I10)   3  Eye problem (V41 1) (H57 9)   4  Flu vaccine need (V04 81) (Z23)   5  History of allergy (V15 09) (Z88 9)   6  Hyperlipidemia (272 4) (E78 5)   7  Hypokalemia (276 8) (E87 6)   8  Mild persistent asthma without complication (629 47) (K11 85)   9  Need for prophylactic vaccination or inoculation against diphtheria and tetanus (V06 5)   (Z23)   10  Need for revaccination (V05 9) (Z23)   11  Non-toxic multinodular goiter (241 1) (E04 2)   12  Screening for genitourinary condition (V81 6) (Z13 89)   13  Urine frequency (788 41) (R35 0)   14  Visit for screening mammogram (V76 12) (Z12 31)    Past Medical History    1  History of acute conjunctivitis (V12 49) (Z86 69)   2  History of allergic rhinitis (V12 69) (Z87 09)   3  History of hypokalemia (V12 29) (Z86 39)   4  History of urinary tract infection (V13 02) (Z87 440)   5  History of urinary tract infection (V13 02) (Z87 440)   6  History of Screening for neurological condition (V80 09) (Z13 89)   7  Urinary tract infection (599 0) (N39 0)   8  History of Urinary tract infection (599 0) (N39 0)   9  History of Urine troubles (V47 4) (R39 89)  Active Problems And Past Medical History Reviewed: The active problems and past medical history were reviewed and updated today  Family History  Mother    1  Family history of Lung Cancer (V16 1)  Family History    2  Family history of Asthma (V17 5)   3  Family history of Coronary Artery Disease (V17 49)   4  Family history of Thyroid Disorder (V18 19)  Family History Reviewed: The family history was reviewed and updated today  Social History    · Being A Social Drinker   · Denied: History of Drug Use   · Former smoker (V15 82) (P49 134)   · Marital History - Currently   Social History Reviewed: The social history was reviewed and updated today   The social history was reviewed and is unchanged  Surgical History    1  History of Colposcopy   2  History of Laparoscopy (Diagnostic)  Surgical History Reviewed: The surgical history was reviewed and updated today  Current Meds   1  Advair Diskus 100-50 MCG/DOSE Inhalation Aerosol Powder Breath Activated; INHALE 1   PUFF DAILY; Therapy: 11TQT5382 to (Evaluate:10Nov2017)  Requested for: 76QBP7698; Last   Rx:40Dgq6657 Ordered   2  AmLODIPine Besylate 5 MG Oral Tablet; take 1 tablet every day; Therapy: 27DIG9821 to (Evaluate:10Jan2018)  Requested for: 06PRI7794; Last   Rx:15Jan2017 Ordered   3  Calcium 600+D 600-400 MG-UNIT Oral Tablet; Take one tablet daily; Therapy: (Recorded:77Tso1142) to Recorded   4  Claritin 10 MG Oral Tablet; take 1 tablet daily as needed  Requested for: 31YEA7094;   Last Rx:18Feb2016 Ordered   5  Fluticasone Propionate 50 MCG/ACT Nasal Suspension; use 2 sprays in each nostril   once daily; Therapy: 33LHK6220 to (Evaluate:14Jan2018)  Requested for: 19Apr2017; Last   Rx:19Apr2017 Ordered   6  Losartan Potassium-HCTZ 100-25 MG Oral Tablet; TAKE 1 TABLET DAILY; Therapy: 03TGO9204 to (All Bio)  Requested for: 54PVQ4400; Last   Rx:11Nov2016 Ordered   7  Montelukast Sodium 10 MG Oral Tablet; TAKE 1 TABLET AT BEDTIME; Therapy: 18ACO0455 to (Evaluate:76Vvq0274)  Requested for: 56Lgh9963; Last   Rx:94Tkz1718 Ordered   8  Potassium Chloride ER 20 MEQ Oral Tablet Extended Release; 1 tab PO once  daily; Therapy: 30WNQ3340 to (Evaluate:18Feb2018)  Requested for: 71Uue0983; Last   Rx:23Dnz9099 Ordered   9  Pravastatin Sodium 40 MG Oral Tablet; take 1 tablet every day; Therapy: 48KVH9682 to (Evaluate:27Apr2018)  Requested for: 96HST3635; Last   CH:51KVT7161 Ordered  Medication List Reviewed: The medication list was reviewed and updated today  Allergies    1  Sulfa Drugs    2   Seasonal    Vitals  Signs   Recorded: 91ODT1962 10:03AM   Temperature: 98 9 F, Temporal  Heart Rate: 71  Respiration: 18  Systolic: 635  Diastolic: 70  Height: 5 ft 4 in  Weight: 158 lb   BMI Calculated: 27 12  BSA Calculated: 1 77  O2 Saturation: 98  LMP: post menopasual  Pain Scale: 0    Physical Exam    Constitutional   General appearance: No acute distress, well appearing and well nourished  Pulmonary   Respiratory effort: No increased work of breathing or signs of respiratory distress  Auscultation of lungs: Clear to auscultation  Cardiovascular   Palpation of heart: Normal PMI, no thrills  Auscultation of heart: Normal rate and rhythm, normal S1 and S2, without murmurs  Skin   Examination of the skin for lesions: Abnormal   1/2 cm circular lesion next to spine of left mid back, no redness or erythema  Psychiatric   Orientation to person, place, and time: Normal     Mood and affect: Normal        Future Appointments    Date/Time Provider Specialty Site   02/01/2018 09:30 AM RUBIN Sierra   [facility]     Signatures   Electronically signed by : April Deal; Oct  4 2017  3:36PM EST                       (Author)    Electronically signed by : Narda Guidry DO; Oct  5 2017 10:28AM EST                       (Co-author)

## 2018-01-22 VITALS
BODY MASS INDEX: 26.98 KG/M2 | HEIGHT: 64 IN | DIASTOLIC BLOOD PRESSURE: 78 MMHG | OXYGEN SATURATION: 96 % | WEIGHT: 158 LBS | HEART RATE: 76 BPM | SYSTOLIC BLOOD PRESSURE: 140 MMHG

## 2018-01-23 VITALS
HEIGHT: 64 IN | WEIGHT: 158 LBS | DIASTOLIC BLOOD PRESSURE: 72 MMHG | SYSTOLIC BLOOD PRESSURE: 164 MMHG | OXYGEN SATURATION: 98 % | HEART RATE: 76 BPM | BODY MASS INDEX: 26.98 KG/M2 | RESPIRATION RATE: 18 BRPM | TEMPERATURE: 97.6 F

## 2018-01-25 ENCOUNTER — TRANSCRIBE ORDERS (OUTPATIENT)
Dept: ADMINISTRATIVE | Age: 73
End: 2018-01-25

## 2018-01-25 ENCOUNTER — HOSPITAL ENCOUNTER (OUTPATIENT)
Dept: RADIOLOGY | Age: 73
Discharge: HOME | End: 2018-01-25
Payer: COMMERCIAL

## 2018-01-25 ENCOUNTER — APPOINTMENT (OUTPATIENT)
Dept: LAB | Age: 73
End: 2018-01-25
Payer: COMMERCIAL

## 2018-01-25 DIAGNOSIS — E78.5 HYPERLIPIDEMIA: ICD-10-CM

## 2018-01-25 DIAGNOSIS — E87.6 HYPOKALEMIA: ICD-10-CM

## 2018-01-25 DIAGNOSIS — I10 ESSENTIAL (PRIMARY) HYPERTENSION: ICD-10-CM

## 2018-01-25 DIAGNOSIS — Z12.31 ENCOUNTER FOR SCREENING MAMMOGRAM FOR MALIGNANT NEOPLASM OF BREAST: ICD-10-CM

## 2018-01-25 DIAGNOSIS — E04.2 NONTOXIC MULTINODULAR GOITER: ICD-10-CM

## 2018-01-25 LAB
ALBUMIN SERPL BCP-MCNC: 3.4 G/DL (ref 3.5–5)
ALP SERPL-CCNC: 61 U/L (ref 46–116)
ALT SERPL W P-5'-P-CCNC: 25 U/L (ref 12–78)
ANION GAP SERPL CALCULATED.3IONS-SCNC: 6 MMOL/L (ref 4–13)
AST SERPL W P-5'-P-CCNC: 15 U/L (ref 5–45)
BASOPHILS # BLD AUTO: 0.03 THOUSANDS/ΜL (ref 0–0.1)
BASOPHILS NFR BLD AUTO: 1 % (ref 0–1)
BILIRUB SERPL-MCNC: 0.71 MG/DL (ref 0.2–1)
BUN SERPL-MCNC: 16 MG/DL (ref 5–25)
CALCIUM SERPL-MCNC: 9 MG/DL (ref 8.3–10.1)
CHLORIDE SERPL-SCNC: 106 MMOL/L (ref 100–108)
CHOLEST SERPL-MCNC: 174 MG/DL (ref 50–200)
CO2 SERPL-SCNC: 28 MMOL/L (ref 21–32)
CREAT SERPL-MCNC: 1.1 MG/DL (ref 0.6–1.3)
EOSINOPHIL # BLD AUTO: 0.65 THOUSAND/ΜL (ref 0–0.61)
EOSINOPHIL NFR BLD AUTO: 10 % (ref 0–6)
ERYTHROCYTE [DISTWIDTH] IN BLOOD BY AUTOMATED COUNT: 14.4 % (ref 11.6–15.1)
GFR SERPL CREATININE-BSD FRML MDRD: 50 ML/MIN/1.73SQ M
GLUCOSE P FAST SERPL-MCNC: 92 MG/DL (ref 65–99)
HCT VFR BLD AUTO: 43.5 % (ref 34.8–46.1)
HDLC SERPL-MCNC: 72 MG/DL (ref 40–60)
HGB BLD-MCNC: 14.5 G/DL (ref 11.5–15.4)
LDLC SERPL CALC-MCNC: 93 MG/DL (ref 0–100)
LYMPHOCYTES # BLD AUTO: 2.49 THOUSANDS/ΜL (ref 0.6–4.47)
LYMPHOCYTES NFR BLD AUTO: 39 % (ref 14–44)
MCH RBC QN AUTO: 27.4 PG (ref 26.8–34.3)
MCHC RBC AUTO-ENTMCNC: 33.3 G/DL (ref 31.4–37.4)
MCV RBC AUTO: 82 FL (ref 82–98)
MONOCYTES # BLD AUTO: 0.69 THOUSAND/ΜL (ref 0.17–1.22)
MONOCYTES NFR BLD AUTO: 11 % (ref 4–12)
NEUTROPHILS # BLD AUTO: 2.55 THOUSANDS/ΜL (ref 1.85–7.62)
NEUTS SEG NFR BLD AUTO: 39 % (ref 43–75)
NRBC BLD AUTO-RTO: 0 /100 WBCS
PLATELET # BLD AUTO: 208 THOUSANDS/UL (ref 149–390)
PMV BLD AUTO: 12.6 FL (ref 8.9–12.7)
POTASSIUM SERPL-SCNC: 3.4 MMOL/L (ref 3.5–5.3)
PROT SERPL-MCNC: 7.1 G/DL (ref 6.4–8.2)
RBC # BLD AUTO: 5.3 MILLION/UL (ref 3.81–5.12)
SODIUM SERPL-SCNC: 140 MMOL/L (ref 136–145)
TRIGL SERPL-MCNC: 44 MG/DL
TSH SERPL DL<=0.05 MIU/L-ACNC: 1.08 UIU/ML (ref 0.36–3.74)
WBC # BLD AUTO: 6.42 THOUSAND/UL (ref 4.31–10.16)

## 2018-01-25 PROCEDURE — 36415 COLL VENOUS BLD VENIPUNCTURE: CPT

## 2018-01-25 PROCEDURE — 77067 SCR MAMMO BI INCL CAD: CPT

## 2018-01-25 PROCEDURE — 80053 COMPREHEN METABOLIC PANEL: CPT

## 2018-01-25 PROCEDURE — 85025 COMPLETE CBC W/AUTO DIFF WBC: CPT

## 2018-01-25 PROCEDURE — 80061 LIPID PANEL: CPT

## 2018-01-25 PROCEDURE — 84443 ASSAY THYROID STIM HORMONE: CPT

## 2018-02-01 ENCOUNTER — OFFICE VISIT (OUTPATIENT)
Dept: INTERNAL MEDICINE CLINIC | Facility: CLINIC | Age: 73
End: 2018-02-01
Payer: COMMERCIAL

## 2018-02-01 VITALS
WEIGHT: 151.2 LBS | HEART RATE: 81 BPM | DIASTOLIC BLOOD PRESSURE: 76 MMHG | SYSTOLIC BLOOD PRESSURE: 144 MMHG | BODY MASS INDEX: 25.81 KG/M2 | HEIGHT: 64 IN

## 2018-02-01 DIAGNOSIS — E78.2 MIXED HYPERLIPIDEMIA: ICD-10-CM

## 2018-02-01 DIAGNOSIS — J45.30 MILD PERSISTENT ASTHMA WITHOUT COMPLICATION: ICD-10-CM

## 2018-02-01 DIAGNOSIS — I10 BENIGN ESSENTIAL HYPERTENSION: ICD-10-CM

## 2018-02-01 DIAGNOSIS — I10 HYPERTENSION, ESSENTIAL, BENIGN: Primary | ICD-10-CM

## 2018-02-01 DIAGNOSIS — E87.6 HYPOKALEMIA: ICD-10-CM

## 2018-02-01 PROBLEM — H10.30 ACUTE CONJUNCTIVITIS: Status: RESOLVED | Noted: 2018-01-13 | Resolved: 2018-02-01

## 2018-02-01 PROBLEM — H10.30 ACUTE CONJUNCTIVITIS: Status: ACTIVE | Noted: 2018-01-13

## 2018-02-01 PROBLEM — L72.3 SEBACEOUS CYST: Status: ACTIVE | Noted: 2018-02-01

## 2018-02-01 PROCEDURE — 1101F PT FALLS ASSESS-DOCD LE1/YR: CPT | Performed by: INTERNAL MEDICINE

## 2018-02-01 PROCEDURE — 3725F SCREEN DEPRESSION PERFORMED: CPT | Performed by: INTERNAL MEDICINE

## 2018-02-01 PROCEDURE — 99214 OFFICE O/P EST MOD 30 MIN: CPT | Performed by: INTERNAL MEDICINE

## 2018-02-01 RX ORDER — FLUTICASONE PROPIONATE 50 MCG
2 SPRAY, SUSPENSION (ML) NASAL DAILY
COMMUNITY
Start: 2014-12-23 | End: 2018-09-11 | Stop reason: SDUPTHER

## 2018-02-01 RX ORDER — LOSARTAN POTASSIUM AND HYDROCHLOROTHIAZIDE 25; 100 MG/1; MG/1
1 TABLET ORAL DAILY
COMMUNITY
Start: 2011-12-14 | End: 2018-06-13

## 2018-02-01 RX ORDER — MONTELUKAST SODIUM 10 MG/1
1 TABLET ORAL DAILY
COMMUNITY
Start: 2011-06-06 | End: 2018-06-13

## 2018-02-01 RX ORDER — POTASSIUM CHLORIDE 1500 MG/1
20 TABLET, EXTENDED RELEASE ORAL DAILY
Refills: 1 | COMMUNITY
Start: 2017-11-18 | End: 2018-02-01 | Stop reason: SDUPTHER

## 2018-02-01 RX ORDER — AMLODIPINE BESYLATE 2.5 MG/1
TABLET ORAL
Qty: 90 TABLET | Refills: 1 | Status: SHIPPED | OUTPATIENT
Start: 2018-02-01 | End: 2018-07-23 | Stop reason: SDUPTHER

## 2018-02-01 RX ORDER — B-COMPLEX WITH VITAMIN C
1 TABLET ORAL DAILY
COMMUNITY
End: 2021-01-01 | Stop reason: HOSPADM

## 2018-02-01 RX ORDER — LORATADINE 10 MG/1
1 TABLET ORAL DAILY PRN
COMMUNITY
End: 2021-01-01 | Stop reason: HOSPADM

## 2018-02-01 RX ORDER — PRAVASTATIN SODIUM 40 MG
1 TABLET ORAL DAILY
COMMUNITY
Start: 2012-05-21 | End: 2018-06-13

## 2018-02-01 RX ORDER — TOBRAMYCIN 3 MG/ML
1 SOLUTION/ DROPS OPHTHALMIC 4 TIMES DAILY
COMMUNITY
Start: 2018-01-13 | End: 2018-02-01 | Stop reason: ALTCHOICE

## 2018-02-01 RX ORDER — POTASSIUM CHLORIDE 1500 MG/1
20 TABLET, EXTENDED RELEASE ORAL DAILY
Qty: 90 TABLET | Refills: 1 | Status: SHIPPED | OUTPATIENT
Start: 2018-02-01 | End: 2018-02-16 | Stop reason: SDUPTHER

## 2018-02-01 RX ORDER — AMLODIPINE BESYLATE 5 MG/1
TABLET ORAL
Qty: 90 TABLET | Refills: 1 | Status: SHIPPED | OUTPATIENT
Start: 2018-02-01 | End: 2018-07-23 | Stop reason: SDUPTHER

## 2018-02-01 RX ORDER — AMLODIPINE BESYLATE 5 MG/1
1 TABLET ORAL DAILY
COMMUNITY
Start: 2011-12-15 | End: 2018-02-01 | Stop reason: SDUPTHER

## 2018-02-01 NOTE — PROGRESS NOTES
Assessment/Plan:    Benign essential hypertension  Increase amlodipine  Check BP at home and call if it remains >140/90    Hyperlipidemia  controlled    Hypokalemia  Cont Kcl  Increase K rich foods in diet    Mild persistent asthma without complication  controlled    Sebaceous cyst  Monitor         Problem List Items Addressed This Visit     Benign essential hypertension     Increase amlodipine  Check BP at home and call if it remains >140/90         Relevant Medications    losartan-hydrochlorothiazide (HYZAAR) 100-25 MG per tablet    amLODIPine (NORVASC) 2 5 mg tablet    amLODIPine (NORVASC) 5 mg tablet    Hyperlipidemia     controlled         Relevant Medications    pravastatin (PRAVACHOL) 40 mg tablet    Other Relevant Orders    Lipid panel    Hypokalemia     Cont Kcl  Increase K rich foods in diet         Relevant Medications    KLOR-CON M20 20 MEQ tablet    Mild persistent asthma without complication     controlled         Relevant Medications    fluticasone-salmeterol (ADVAIR DISKUS) 100-50 mcg/dose    montelukast (SINGULAIR) 10 mg tablet      Other Visit Diagnoses     Hypertension, essential, benign    -  Primary    Relevant Medications    losartan-hydrochlorothiazide (HYZAAR) 100-25 MG per tablet    amLODIPine (NORVASC) 2 5 mg tablet    amLODIPine (NORVASC) 5 mg tablet    Other Relevant Orders    CBC and differential    Comprehensive metabolic panel            Subjective:      Patient ID: Mendy Ortiz is a 67 y o  female      Doing well overall but reports that her BP has been running higher 140-150s/70s at home  Denies CP palpitations headaches  Only chronic sinus pressure from allergies  Some SOB with exertion, not new  High in urgent care recently when she was seen for conjunctivitis  She is now on a Tobradex eye drop from her ophthalmologist          The following portions of the patient's history were reviewed and updated as appropriate: allergies, current medications, past family history, past medical history, past social history, past surgical history and problem list   Past Medical History:   Diagnosis Date    Allergic rhinitis     last assessed: 12/12/2012    Hypokalemia     last assessed: 12/14/2012     Past Surgical History:   Procedure Laterality Date    COLPOSCOPY      DIAGNOSTIC LAPAROSCOPY  1987     Social History   Substance Use Topics    Smoking status: Former Smoker     Quit date: 1986    Smokeless tobacco: Never Used    Alcohol use Yes      Comment: social     Family History   Problem Relation Age of Onset    Lung cancer Mother     Asthma Family     Coronary artery disease Family     Thyroid disease Family      Current Outpatient Prescriptions   Medication Sig Dispense Refill    amLODIPine (NORVASC) 5 mg tablet Take 2 5 and 5mg daily 90 tablet 1    fluticasone (FLONASE) 50 mcg/act nasal spray 2 sprays into each nostril daily      fluticasone-salmeterol (ADVAIR DISKUS) 100-50 mcg/dose Inhale 1 puff daily      losartan-hydrochlorothiazide (HYZAAR) 100-25 MG per tablet Take 1 tablet by mouth daily      montelukast (SINGULAIR) 10 mg tablet Take 1 tablet by mouth daily      pravastatin (PRAVACHOL) 40 mg tablet Take 1 tablet by mouth daily      amLODIPine (NORVASC) 2 5 mg tablet Take 2 5 and 5mg daily 90 tablet 1    amLODIPine (NORVASC) 5 mg tablet Take 5 mg by mouth daily      Calcium Carbonate-Vitamin D (CALCIUM 600+D) 600-200 MG-UNIT TABS Take 1 tablet by mouth daily      fluticasone-salmeterol (ADVAIR) 100-50 mcg/dose Inhale 1 puff every 12 (twelve) hours      KLOR-CON M20 20 MEQ tablet Take 1 tablet (20 mEq total) by mouth daily 90 tablet 1    loratadine (CLARITIN) 10 mg tablet Take 1 tablet by mouth daily as needed      losartan-hydrochlorothiazide (HYZAAR) 100-25 MG per tablet Take 1 tablet by mouth daily      mometasone (NASONEX) 50 mcg/act nasal spray 2 sprays into each nostril daily      montelukast (SINGULAIR) 10 mg tablet Take 10 mg by mouth daily at bedtime  pravastatin (PRAVACHOL) 40 mg tablet Take 40 mg by mouth daily       No current facility-administered medications for this visit  Allergies   Allergen Reactions    Sulfa Antibiotics Other (See Comments)     Black spots under skin     Review of Systems   Constitutional: Negative for fatigue and fever  HENT: Negative for sinus pressure  Eyes:        Watery eyes   Respiratory: Positive for shortness of breath  Negative for cough and wheezing  Cardiovascular: Negative for chest pain, palpitations and leg swelling  Gastrointestinal: Negative for abdominal pain, blood in stool, constipation, diarrhea, nausea and vomiting  Genitourinary: Negative for vaginal bleeding  Skin:        Lump on her back   Neurological: Negative for dizziness and headaches  Objective:     Physical Exam   Constitutional: She is oriented to person, place, and time  She appears well-developed and well-nourished  HENT:   Head: Normocephalic and atraumatic  Right Ear: External ear normal    Left Ear: External ear normal    Mouth/Throat: Oropharynx is clear and moist    Eyes: Conjunctivae are normal    Neck: Neck supple  Cardiovascular: Normal rate, regular rhythm and normal heart sounds  No murmur heard  Pulmonary/Chest: Effort normal and breath sounds normal  No respiratory distress  She has no wheezes  She has no rales  Abdominal: Soft  Bowel sounds are normal  She exhibits no distension and no mass  There is no tenderness  There is no rebound and no guarding  Musculoskeletal: Normal range of motion  Neurological: She is alert and oriented to person, place, and time  Skin: Skin is warm and dry  1 cm soft movable non tender nodule subcutaneous on the mid back   Psychiatric: She has a normal mood and affect   Her behavior is normal  Judgment and thought content normal

## 2018-02-15 DIAGNOSIS — E78.2 MIXED HYPERLIPIDEMIA: Primary | ICD-10-CM

## 2018-02-15 RX ORDER — PRAVASTATIN SODIUM 40 MG
40 TABLET ORAL DAILY
Qty: 90 TABLET | Refills: 2 | Status: SHIPPED | OUTPATIENT
Start: 2018-02-15 | End: 2018-07-23 | Stop reason: SDUPTHER

## 2018-02-16 DIAGNOSIS — E87.6 HYPOKALEMIA: ICD-10-CM

## 2018-02-19 ENCOUNTER — TELEPHONE (OUTPATIENT)
Dept: INTERNAL MEDICINE CLINIC | Facility: CLINIC | Age: 73
End: 2018-02-19

## 2018-02-19 RX ORDER — POTASSIUM CHLORIDE 1500 MG/1
TABLET, EXTENDED RELEASE ORAL
Qty: 90 TABLET | Refills: 1 | Status: SHIPPED | OUTPATIENT
Start: 2018-02-19 | End: 2019-01-31 | Stop reason: SDUPTHER

## 2018-02-19 NOTE — TELEPHONE ENCOUNTER
Patient notified 2/16/18,  ----- Message from Ramon Klinefelter sent at 2/16/2018  8:17 AM EST -----  Notified patient kf  ----- Message -----  From: Mary Alice Damico MD  Sent: 2/15/2018   6:31 PM  To:  Fouzia Monroe [phone] call her that I received a letter from Elieser Whitehead about switching to a 90 day supply of the pravastatin which will be cheaper  I will send a script to her pharmacy so when she gets it refilled, it will be 90 days , not 30 days

## 2018-03-07 NOTE — PROGRESS NOTES
History of Present Illness    Revaccination    Spoke with patient regarding  Action(s): Pt will be revaccinated  Appointment scheduled: 01 10 2017 SM  Other Information: THE PATIENT HAS AN APPT WITH DR Kane Jackson ON 1/10/17 AND WILL HAVE TDAP AT THIS TIME  Revaccination Completed: 01 10 2017 AD  Active Problems    1  Flu vaccine need (V04 81) (Z23)   2  History of allergy (V15 09) (Z88 9)   3  Need for prophylactic vaccination or inoculation against diphtheria and tetanus (V06 5)   (Z23)   4  Need for revaccination (V05 9) (Z23)   5  Screening for genitourinary condition (V81 6) (Z13 89)   6  Urine frequency (788 41) (R35 0)   7  Visit for screening mammogram (V76 12) (Z12 31)    Immunizations  Influenza --- Lorella Sera: Temporarily Deferred: Patient reports item recently done, did not receive high  dose in the hospital; Series2: 17-Oct-2008  (63y); Series3: Oct 2014  (69y)   PCV --- Lorella Sera: 18-Dec-2014  (69y)   PPSV --- Lorella Sera: 16-Jun-2010  (65y)   Td/DT --- Lorella BucklesLorina Sine Date   Zoster --- Series1: 11-Sep-2006  (61y); Emily Brought: 2009Jenise Orellana: 2009; Series4: 2009     Current Meds   1  Advair Diskus 100-50 MCG/DOSE Inhalation Aerosol Powder Breath Activated; INHALE 1   PUFF DAILY   2  AmLODIPine Besylate 5 MG Oral Tablet; take 1 tablet every day   3  Calcium 600+D 600-400 MG-UNIT Oral Tablet; Take one tablet daily   4  Claritin 10 MG Oral Tablet; take 1 tablet daily as needed   5  Fluticasone Propionate 50 MCG/ACT Nasal Suspension; use 2 sprays in each nostril   once daily   6  Losartan Potassium-HCTZ 100-25 MG Oral Tablet; TAKE 1 TABLET DAILY   7  Montelukast Sodium 10 MG Oral Tablet; TAKE 1 TABLET AT BEDTIME   8  Potassium Chloride ER 10 MEQ Oral Capsule Extended Release; TAKE 1 CAPSULE   DAILY   9  Pravastatin Sodium 40 MG Oral Tablet; take 1 tablet every day   10  Tobramycin 0 3 % Ophthalmic Solution; instill three drops tid for five days    Allergies    1  Sulfa Drugs    2  Seasonal    Assessment    1  Mild persistent asthma without complication (130 27) (J37 66)    Future Appointments    Date/Time Provider Specialty Site   07/20/2017 09:30 AM RUBIN Plasencia   [facility]     Signatures   Electronically signed by : RUBIN Ponce ; Jan 17 2017 12:08AM EST                       (Review)

## 2018-06-04 ENCOUNTER — HOSPITAL ENCOUNTER (OUTPATIENT)
Dept: RADIOLOGY | Age: 73
Discharge: HOME | End: 2018-06-04
Payer: COMMERCIAL

## 2018-06-04 DIAGNOSIS — E04.2 NONTOXIC MULTINODULAR GOITER: ICD-10-CM

## 2018-06-04 PROCEDURE — 76536 US EXAM OF HEAD AND NECK: CPT

## 2018-06-12 PROBLEM — L72.9 SKIN CYST: Status: ACTIVE | Noted: 2017-10-04

## 2018-06-12 PROBLEM — L72.9 SKIN CYST: Status: RESOLVED | Noted: 2017-10-04 | Resolved: 2018-06-12

## 2018-06-12 PROBLEM — L72.3 SEBACEOUS CYST: Status: RESOLVED | Noted: 2018-02-01 | Resolved: 2018-06-12

## 2018-06-13 ENCOUNTER — OFFICE VISIT (OUTPATIENT)
Dept: SURGICAL ONCOLOGY | Facility: CLINIC | Age: 73
End: 2018-06-13
Payer: COMMERCIAL

## 2018-06-13 VITALS
RESPIRATION RATE: 16 BRPM | HEIGHT: 64 IN | HEART RATE: 66 BPM | SYSTOLIC BLOOD PRESSURE: 146 MMHG | BODY MASS INDEX: 25.95 KG/M2 | DIASTOLIC BLOOD PRESSURE: 82 MMHG | WEIGHT: 152 LBS | TEMPERATURE: 98.6 F

## 2018-06-13 DIAGNOSIS — E04.2 NON-TOXIC MULTINODULAR GOITER: Primary | ICD-10-CM

## 2018-06-13 PROCEDURE — 99213 OFFICE O/P EST LOW 20 MIN: CPT | Performed by: SURGERY

## 2018-06-13 NOTE — PROGRESS NOTES
Surgical Oncology Follow Up       [address] [place] [address],[address] [place] [address]  [facility] [address]  1945  [id number]  [address] [place] [address]  [facility] [address] [address]    Chief Complaint   Patient presents with    Thyroid Problem     Patient is here following ultrasound       Assessment/Plan:    No problem-specific Assessment & Plan notes found for this encounter  Diagnoses and all orders for this visit:    Non-toxic multinodular goiter  -     US thyroid; Future        Advance Care Planning/Advance Directives:  Discussed disease status, cancer treatment plans and/or cancer treatment goals with the patient  No history exists  History of Present Illness:  New [place] woman who we have been following for thyroid nodules for the last few years   -Interval History:She comes in with no symptoms having had a recent ultrasound done  Review of Systems:  Review of Systems   Constitutional: Negative  HENT: Negative  Eyes: Negative  Respiratory: Negative  Cardiovascular: Negative  Gastrointestinal: Negative  Endocrine: Negative  Genitourinary: Negative  Musculoskeletal: Negative  Skin: Negative  Allergic/Immunologic: Negative  Neurological: Negative  Hematological: Negative  Psychiatric/Behavioral: Negative          Patient Active Problem List   Diagnosis    Benign essential hypertension    Hyperlipidemia    Hypokalemia    Mild persistent asthma without complication    Non-toxic multinodular goiter    Urine frequency     Past Medical History:   Diagnosis Date    Allergic rhinitis     last assessed: 12/12/2012    Hypokalemia     last assessed: 12/14/2012     Past Surgical History:   Procedure Laterality Date    COLPOSCOPY      DIAGNOSTIC LAPAROSCOPY  1987     Family History   Problem Relation Age of Onset    Lung cancer Mother     Asthma Family     Coronary artery disease Family     Thyroid disease Family      Social History     Social History    Marital status: /Civil Union     Spouse name: N/A    Number of children: N/A    Years of education: N/A     Occupational History    Not on file  Social History Main Topics    Smoking status: Former Smoker     Quit date: 1986    Smokeless tobacco: Never Used    Alcohol use Yes      Comment: social    Drug use: No    Sexual activity: Not on file     Other Topics Concern    Not on file     Social History Narrative    No narrative on file       Current Outpatient Prescriptions:     amLODIPine (NORVASC) 2 5 mg tablet, Take 2 5 and 5mg daily, Disp: 90 tablet, Rfl: 1    amLODIPine (NORVASC) 5 mg tablet, Take 2 5 and 5mg daily, Disp: 90 tablet, Rfl: 1    Calcium Carbonate-Vitamin D (CALCIUM 600+D) 600-200 MG-UNIT TABS, Take 1 tablet by mouth daily, Disp: , Rfl:     fluticasone (FLONASE) 50 mcg/act nasal spray, 2 sprays into each nostril daily, Disp: , Rfl:     fluticasone-salmeterol (ADVAIR DISKUS) 100-50 mcg/dose, Inhale 1 puff daily, Disp: , Rfl:     KLOR-CON M20 20 MEQ tablet, TAKE 1 TABLET EVERY DAY, Disp: 90 tablet, Rfl: 1    loratadine (CLARITIN) 10 mg tablet, Take 1 tablet by mouth daily as needed, Disp: , Rfl:     losartan-hydrochlorothiazide (HYZAAR) 100-25 MG per tablet, Take 1 tablet by mouth daily, Disp: , Rfl:     montelukast (SINGULAIR) 10 mg tablet, Take 10 mg by mouth daily at bedtime, Disp: , Rfl:     pravastatin (PRAVACHOL) 40 mg tablet, Take 1 tablet (40 mg total) by mouth daily, Disp: 90 tablet, Rfl: 2  Allergies   Allergen Reactions    Sulfa Antibiotics Other (See Comments) and Hives     Black spots under skin    Other Allergic Rhinitis     Vitals:    06/13/18 0846   BP: 146/82   Pulse: 66   Resp: 16   Temp: 98 6 °F (37 °C)       Physical Exam   Constitutional: She is oriented to person, place, and time   She appears well-developed and well-nourished  HENT:   Head: Normocephalic and atraumatic  Right Ear: External ear normal    Left Ear: External ear normal    Eyes: Conjunctivae and EOM are normal  Pupils are equal, round, and reactive to light  Neck: Normal range of motion  Neck supple  Cardiovascular: Normal rate, regular rhythm, normal heart sounds and intact distal pulses  Pulmonary/Chest: Effort normal and breath sounds normal    Abdominal: Soft  Bowel sounds are normal    Musculoskeletal: Normal range of motion  Neurological: She is alert and oriented to person, place, and time  Skin: Skin is warm and dry  Results:  Labs:  CBC, Coags, BMP, Mg, Phos     Imaging  Us Thyroid    Result Date: 6/6/2018  Narrative: THYROID ULTRASOUND INDICATION:    E04 2: Nontoxic multinodular goiter  COMPARISON:  6/22/2016, 6/24/2015, 11/13/2013 TECHNIQUE:   Ultrasound of the thyroid was performed with a high frequency linear transducer in transverse and sagittal planes including volumetric imaging sweeps as well as traditional still imaging technique  FINDINGS:  Normal homogeneous smooth echotexture  Right lobe:  6 4 x 4 6 x 8 4 cm  Left lobe:  1 3 x 1 0 x 3 6 cm  Isthmus:  0 4 cm  Nodule #1 LEFT lower pole nodule measuring 0 6 x 0 9 x 1 1 cm  Unchanged from prior  COMPOSITION:  2 points, solid or almost completely solid   ECHOGENICITY:  2 points, hypoechoic  SHAPE:  0 points, wider-than-tall  MARGIN: 0 points, smooth  ECHOGENIC FOCI:  0 points, none or large comet-tail artifacts  TI-RADS Classification: TR 4 (4-6 points), Moderately suspicious  FNA if > 1 5 cm  Follow if > 1cm  The entire right lobe is enlarged with cystic change  The appearance is similar to the prior study  Overall, this appears increased from 2013 due to increasing central cystic component  Impression: Diffuse enlargement of the right lobe with cystic change    This has increased since 2013 though secondary to increasing central cystic component and a repeat biopsy is not warranted  A left upper pole nodule has also been stable since 2013 and is consistent with a benign nodule  Reference: ACR Thyroid Imaging, Reporting and Data System (TI-RADS): White Paper of the [employer]  J AM Amari Radiol 7571;51:023-038  (additional recommendations based on American Thyroid Association 2015 guidelines ) Workstation performed: [id number]     I reviewed the above laboratory and imaging data  Discussion/Summary: Thyroid nodules, stable for the most part in size and appearance  Prior biopsies were benign  Plan a 2 year follow-up for continued surveillance as she is presently asymptomatic

## 2018-06-13 NOTE — LETTER
June 13, 2018     Clay Muniz, [address] [facility]  [address] [facility] [address]    Patient: George Saab   YOB: 1945   Date of Visit: 6/13/2018       Dear Dr Any Steele: Thank you for referring Tone Fonseca to me for evaluation  Below are my notes for this consultation  If you have questions, please do not hesitate to call me  I look forward to following your patient along with you  Sincerely,        Mahin King MD        CC: MD Mahin Thompson MD  6/13/2018  9:17 AM  Sign at close encounter     Surgical Oncology Follow Up       [address] [place] [address]  1945  [id number]  [address] [facility] SURGICAL ONCOLOGY [address] [address]    Chief Complaint   Patient presents with    Thyroid Problem     Patient is here following ultrasound       Assessment/Plan:    No problem-specific Assessment & Plan notes found for this encounter  Diagnoses and all orders for this visit:    Non-toxic multinodular goiter  -     US thyroid; Future        Advance Care Planning/Advance Directives:  Discussed disease status, cancer treatment plans and/or cancer treatment goals with the patient  No history exists  History of Present Illness:  69-year-old woman who we have been following for thyroid nodules for the last few years   -Interval History:She comes in with no symptoms having had a recent ultrasound done  Review of Systems:  Review of Systems   Constitutional: Negative  HENT: Negative  Eyes: Negative  Respiratory: Negative  Cardiovascular: Negative  Gastrointestinal: Negative  Endocrine: Negative  Genitourinary: Negative  Musculoskeletal: Negative  Skin: Negative  Allergic/Immunologic: Negative  Neurological: Negative  Hematological: Negative  Psychiatric/Behavioral: Negative  Patient Active Problem List   Diagnosis    Benign essential hypertension    Hyperlipidemia    Hypokalemia    Mild persistent asthma without complication    Non-toxic multinodular goiter    Urine frequency     Past Medical History:   Diagnosis Date    Allergic rhinitis     last assessed: 12/12/2012    Hypokalemia     last assessed: 12/14/2012     Past Surgical History:   Procedure Laterality Date    COLPOSCOPY      DIAGNOSTIC LAPAROSCOPY  1987     Family History   Problem Relation Age of Onset    Lung cancer Mother     Asthma Family     Coronary artery disease Family     Thyroid disease Family      Social History     Social History    Marital status: /Civil Union     Spouse name: N/A    Number of children: N/A    Years of education: N/A     Occupational History    Not on file       Social History Main Topics    Smoking status: Former Smoker     Quit date: 1986    Smokeless tobacco: Never Used    Alcohol use Yes      Comment: social    Drug use: No    Sexual activity: Not on file     Other Topics Concern    Not on file     Social History Narrative    No narrative on file       Current Outpatient Prescriptions:     amLODIPine (NORVASC) 2 5 mg tablet, Take 2 5 and 5mg daily, Disp: 90 tablet, Rfl: 1    amLODIPine (NORVASC) 5 mg tablet, Take 2 5 and 5mg daily, Disp: 90 tablet, Rfl: 1    Calcium Carbonate-Vitamin D (CALCIUM 600+D) 600-200 MG-UNIT TABS, Take 1 tablet by mouth daily, Disp: , Rfl:     fluticasone (FLONASE) 50 mcg/act nasal spray, 2 sprays into each nostril daily, Disp: , Rfl:     fluticasone-salmeterol (ADVAIR DISKUS) 100-50 mcg/dose, Inhale 1 puff daily, Disp: , Rfl:     KLOR-CON M20 20 MEQ tablet, TAKE 1 TABLET EVERY DAY, Disp: 90 tablet, Rfl: 1    loratadine (CLARITIN) 10 mg tablet, Take 1 tablet by mouth daily as needed, Disp: , Rfl:     losartan-hydrochlorothiazide (HYZAAR) 100-25 MG per tablet, Take 1 tablet by mouth daily, Disp: , Rfl:     montelukast (SINGULAIR) 10 mg tablet, Take 10 mg by mouth daily at bedtime, Disp: , Rfl:     pravastatin (PRAVACHOL) 40 mg tablet, Take 1 tablet (40 mg total) by mouth daily, Disp: 90 tablet, Rfl: 2  Allergies   Allergen Reactions    Sulfa Antibiotics Other (See Comments) and Hives     Black spots under skin    Other Allergic Rhinitis     Vitals:    06/13/18 0846   BP: 146/82   Pulse: 66   Resp: 16   Temp: 98 6 °F (37 °C)       Physical Exam   Constitutional: She is oriented to person, place, and time  She appears well-developed and well-nourished  HENT:   Head: Normocephalic and atraumatic  Right Ear: External ear normal    Left Ear: External ear normal    Eyes: Conjunctivae and EOM are normal  Pupils are equal, round, and reactive to light  Neck: Normal range of motion  Neck supple  Cardiovascular: Normal rate, regular rhythm, normal heart sounds and intact distal pulses  Pulmonary/Chest: Effort normal and breath sounds normal    Abdominal: Soft  Bowel sounds are normal    Musculoskeletal: Normal range of motion  Neurological: She is alert and oriented to person, place, and time  Skin: Skin is warm and dry  Results:  Labs:  CBC, Coags, BMP, Mg, Phos     Imaging  Us Thyroid    Result Date: 6/6/2018  Narrative: THYROID ULTRASOUND INDICATION:    E04 2: Nontoxic multinodular goiter  COMPARISON:  6/22/2016, 6/24/2015, 11/13/2013 TECHNIQUE:   Ultrasound of the thyroid was performed with a high frequency linear transducer in transverse and sagittal planes including volumetric imaging sweeps as well as traditional still imaging technique  FINDINGS:  Normal homogeneous smooth echotexture  Right lobe:  6 4 x 4 6 x 8 4 cm  Left lobe:  1 3 x 1 0 x 3 6 cm  Isthmus:  0 4 cm  Nodule #1 LEFT lower pole nodule measuring 0 6 x 0 9 x 1 1 cm  Unchanged from prior  COMPOSITION:  2 points, solid or almost completely solid   ECHOGENICITY:  2 points, hypoechoic    SHAPE:  0 points, wider-than-tall  MARGIN: 0 points, smooth  ECHOGENIC FOCI:  0 points, none or large comet-tail artifacts  TI-RADS Classification: TR 4 (4-6 points), Moderately suspicious  FNA if > 1 5 cm  Follow if > 1cm  The entire right lobe is enlarged with cystic change  The appearance is similar to the prior study  Overall, this appears increased from 2013 due to increasing central cystic component  Impression: Diffuse enlargement of the right lobe with cystic change  This has increased since 2013 though secondary to increasing central cystic component and a repeat biopsy is not warranted  A left upper pole nodule has also been stable since 2013 and is consistent with a benign nodule  Reference: ACR Thyroid Imaging, Reporting and Data System (TI-RADS): White Paper of the [employer]  J AM Amari Radiol 1413;52:169-278  (additional recommendations based on American Thyroid Association 2015 guidelines ) Workstation performed: [id number]     I reviewed the above laboratory and imaging data  Discussion/Summary: Thyroid nodules, stable for the most part in size and appearance  Prior biopsies were benign  Plan a 2 year follow-up for continued surveillance as she is presently asymptomatic

## 2018-07-19 ENCOUNTER — TRANSCRIBE ORDERS (OUTPATIENT)
Dept: ADMINISTRATIVE | Age: 73
End: 2018-07-19

## 2018-07-19 ENCOUNTER — APPOINTMENT (OUTPATIENT)
Dept: LAB | Age: 73
End: 2018-07-19
Payer: COMMERCIAL

## 2018-07-19 DIAGNOSIS — I10 HYPERTENSION, ESSENTIAL, BENIGN: ICD-10-CM

## 2018-07-19 DIAGNOSIS — E78.2 MIXED HYPERLIPIDEMIA: ICD-10-CM

## 2018-07-19 LAB
ALBUMIN SERPL BCP-MCNC: 3.6 G/DL (ref 3.5–5)
ALP SERPL-CCNC: 57 U/L (ref 46–116)
ALT SERPL W P-5'-P-CCNC: 30 U/L (ref 12–78)
ANION GAP SERPL CALCULATED.3IONS-SCNC: 7 MMOL/L (ref 4–13)
AST SERPL W P-5'-P-CCNC: 16 U/L (ref 5–45)
BASOPHILS # BLD AUTO: 0.03 THOUSANDS/ΜL (ref 0–0.1)
BASOPHILS NFR BLD AUTO: 1 % (ref 0–1)
BILIRUB SERPL-MCNC: 0.51 MG/DL (ref 0.2–1)
BUN SERPL-MCNC: 20 MG/DL (ref 5–25)
CALCIUM SERPL-MCNC: 9.2 MG/DL (ref 8.3–10.1)
CHLORIDE SERPL-SCNC: 103 MMOL/L (ref 100–108)
CHOLEST SERPL-MCNC: 207 MG/DL (ref 50–200)
CO2 SERPL-SCNC: 29 MMOL/L (ref 21–32)
CREAT SERPL-MCNC: 1.04 MG/DL (ref 0.6–1.3)
EOSINOPHIL # BLD AUTO: 0.24 THOUSAND/ΜL (ref 0–0.61)
EOSINOPHIL NFR BLD AUTO: 5 % (ref 0–6)
ERYTHROCYTE [DISTWIDTH] IN BLOOD BY AUTOMATED COUNT: 14.1 % (ref 11.6–15.1)
GFR SERPL CREATININE-BSD FRML MDRD: 53 ML/MIN/1.73SQ M
GLUCOSE P FAST SERPL-MCNC: 91 MG/DL (ref 65–99)
HCT VFR BLD AUTO: 45.4 % (ref 34.8–46.1)
HDLC SERPL-MCNC: 70 MG/DL (ref 40–60)
HGB BLD-MCNC: 14.3 G/DL (ref 11.5–15.4)
IMM GRANULOCYTES # BLD AUTO: 0.01 THOUSAND/UL (ref 0–0.2)
IMM GRANULOCYTES NFR BLD AUTO: 0 % (ref 0–2)
LDLC SERPL CALC-MCNC: 124 MG/DL (ref 0–100)
LYMPHOCYTES # BLD AUTO: 1.88 THOUSANDS/ΜL (ref 0.6–4.47)
LYMPHOCYTES NFR BLD AUTO: 37 % (ref 14–44)
MCH RBC QN AUTO: 26.3 PG (ref 26.8–34.3)
MCHC RBC AUTO-ENTMCNC: 31.5 G/DL (ref 31.4–37.4)
MCV RBC AUTO: 84 FL (ref 82–98)
MONOCYTES # BLD AUTO: 0.46 THOUSAND/ΜL (ref 0.17–1.22)
MONOCYTES NFR BLD AUTO: 9 % (ref 4–12)
NEUTROPHILS # BLD AUTO: 2.43 THOUSANDS/ΜL (ref 1.85–7.62)
NEUTS SEG NFR BLD AUTO: 48 % (ref 43–75)
NONHDLC SERPL-MCNC: 137 MG/DL
NRBC BLD AUTO-RTO: 0 /100 WBCS
PLATELET # BLD AUTO: 207 THOUSANDS/UL (ref 149–390)
PMV BLD AUTO: 11.9 FL (ref 8.9–12.7)
POTASSIUM SERPL-SCNC: 3.6 MMOL/L (ref 3.5–5.3)
PROT SERPL-MCNC: 7.1 G/DL (ref 6.4–8.2)
RBC # BLD AUTO: 5.44 MILLION/UL (ref 3.81–5.12)
SODIUM SERPL-SCNC: 139 MMOL/L (ref 136–145)
TRIGL SERPL-MCNC: 65 MG/DL
WBC # BLD AUTO: 5.05 THOUSAND/UL (ref 4.31–10.16)

## 2018-07-19 PROCEDURE — 36415 COLL VENOUS BLD VENIPUNCTURE: CPT

## 2018-07-19 PROCEDURE — 80061 LIPID PANEL: CPT

## 2018-07-19 PROCEDURE — 80053 COMPREHEN METABOLIC PANEL: CPT

## 2018-07-19 PROCEDURE — 85025 COMPLETE CBC W/AUTO DIFF WBC: CPT

## 2018-07-23 DIAGNOSIS — E78.2 MIXED HYPERLIPIDEMIA: ICD-10-CM

## 2018-07-23 DIAGNOSIS — I10 HYPERTENSION, ESSENTIAL, BENIGN: ICD-10-CM

## 2018-07-24 DIAGNOSIS — I10 HYPERTENSION, ESSENTIAL, BENIGN: ICD-10-CM

## 2018-07-24 RX ORDER — AMLODIPINE BESYLATE 2.5 MG/1
TABLET ORAL
Qty: 90 TABLET | Refills: 2 | Status: SHIPPED | OUTPATIENT
Start: 2018-07-24 | End: 2018-07-26 | Stop reason: SDUPTHER

## 2018-07-24 RX ORDER — AMLODIPINE BESYLATE 2.5 MG/1
TABLET ORAL
Qty: 90 TABLET | Refills: 2 | Status: SHIPPED | OUTPATIENT
Start: 2018-07-24 | End: 2019-04-07 | Stop reason: SDUPTHER

## 2018-07-24 RX ORDER — PRAVASTATIN SODIUM 40 MG
40 TABLET ORAL DAILY
Qty: 90 TABLET | Refills: 2 | Status: SHIPPED | OUTPATIENT
Start: 2018-07-24 | End: 2019-01-31 | Stop reason: ALTCHOICE

## 2018-07-24 RX ORDER — AMLODIPINE BESYLATE 5 MG/1
TABLET ORAL
Qty: 90 TABLET | Refills: 2 | Status: SHIPPED | OUTPATIENT
Start: 2018-07-24 | End: 2019-05-14 | Stop reason: SDUPTHER

## 2018-07-26 ENCOUNTER — OFFICE VISIT (OUTPATIENT)
Dept: INTERNAL MEDICINE CLINIC | Facility: CLINIC | Age: 73
End: 2018-07-26
Payer: COMMERCIAL

## 2018-07-26 VITALS
HEART RATE: 70 BPM | SYSTOLIC BLOOD PRESSURE: 138 MMHG | BODY MASS INDEX: 25.95 KG/M2 | OXYGEN SATURATION: 95 % | WEIGHT: 152 LBS | DIASTOLIC BLOOD PRESSURE: 80 MMHG | HEIGHT: 64 IN

## 2018-07-26 DIAGNOSIS — E78.2 MIXED HYPERLIPIDEMIA: ICD-10-CM

## 2018-07-26 DIAGNOSIS — Z00.00 WELLNESS EXAMINATION: Primary | ICD-10-CM

## 2018-07-26 DIAGNOSIS — E04.2 NON-TOXIC MULTINODULAR GOITER: ICD-10-CM

## 2018-07-26 DIAGNOSIS — I10 BENIGN ESSENTIAL HYPERTENSION: ICD-10-CM

## 2018-07-26 DIAGNOSIS — J45.30 MILD PERSISTENT ASTHMA WITHOUT COMPLICATION: ICD-10-CM

## 2018-07-26 PROCEDURE — 4040F PNEUMOC VAC/ADMIN/RCVD: CPT | Performed by: INTERNAL MEDICINE

## 2018-07-26 PROCEDURE — 3079F DIAST BP 80-89 MM HG: CPT | Performed by: INTERNAL MEDICINE

## 2018-07-26 PROCEDURE — 3075F SYST BP GE 130 - 139MM HG: CPT | Performed by: INTERNAL MEDICINE

## 2018-07-26 PROCEDURE — 1036F TOBACCO NON-USER: CPT | Performed by: INTERNAL MEDICINE

## 2018-07-26 PROCEDURE — G0439 PPPS, SUBSEQ VISIT: HCPCS | Performed by: INTERNAL MEDICINE

## 2018-07-26 PROCEDURE — 99214 OFFICE O/P EST MOD 30 MIN: CPT | Performed by: INTERNAL MEDICINE

## 2018-07-26 NOTE — PROGRESS NOTES
Assessment/Plan:  BP controlled Continue current meds  If lipids remain high, will consider switch to atorvastatin       Problem List Items Addressed This Visit     Benign essential hypertension    Relevant Orders    CBC and differential    Comprehensive metabolic panel    Hyperlipidemia    Relevant Orders    Lipid panel    Mild persistent asthma without complication    Non-toxic multinodular goiter    Relevant Orders    TSH, 3rd generation with Free T4 reflex    Wellness examination - Primary            Subjective:      Patient ID: Alda Clay is a 75 y o  female  HPI   BP at home has been 130/80s  Amlodipine increased to 7 5mg at last visit  Recent labs reviewed-higher lipids   She attributes this to a recent vacation where she ate poorly  Asthma controlled  Dr Holley Galindo manages her thyroid-recent US stable    The following portions of the patient's history were reviewed and updated as appropriate: allergies, current medications, past family history, past medical history, past social history, past surgical history and problem list     Review of Systems   Constitutional: Negative for fatigue, fever and unexpected weight change  HENT: Negative for ear pain, hearing loss, sinus pain, sinus pressure and sore throat  Respiratory: Negative for cough, shortness of breath and wheezing  Cardiovascular: Negative for chest pain, palpitations and leg swelling  Gastrointestinal: Negative for abdominal pain, constipation, diarrhea, nausea and vomiting  Musculoskeletal: Negative for arthralgias and myalgias  Neurological: Negative for dizziness and headaches  Objective:      /80 (BP Location: Left arm, Patient Position: Sitting, Cuff Size: Adult)   Pulse 70   Ht 5' 4" (1 626 m)   Wt 68 9 kg (152 lb)   LMP  (LMP Unknown)   SpO2 95%   BMI 26 09 kg/m²          Physical Exam   Constitutional: She is oriented to person, place, and time  She appears well-developed and well-nourished     HENT: Head: Normocephalic and atraumatic  Right Ear: External ear normal    Left Ear: External ear normal    Mouth/Throat: Oropharynx is clear and moist    Eyes: Conjunctivae are normal    Neck: Neck supple  Cardiovascular: Normal rate, regular rhythm and normal heart sounds  No murmur heard  Pulmonary/Chest: Effort normal and breath sounds normal  No respiratory distress  She has no wheezes  She has no rales  Abdominal: Soft  Bowel sounds are normal  She exhibits no distension and no mass  There is no tenderness  There is no rebound and no guarding  Musculoskeletal: Normal range of motion  Neurological: She is alert and oriented to person, place, and time  Skin: Skin is warm and dry  Psychiatric: She has a normal mood and affect   Her behavior is normal  Judgment and thought content normal

## 2018-07-26 NOTE — PROGRESS NOTES
Assessment and Plan:    Problem List Items Addressed This Visit     Benign essential hypertension    Relevant Orders    CBC and differential    Comprehensive metabolic panel    Hyperlipidemia    Relevant Orders    Lipid panel    Mild persistent asthma without complication    Non-toxic multinodular goiter    Relevant Orders    TSH, 3rd generation with Free T4 reflex    Wellness examination - Primary        Health Maintenance Due   Topic Date Due    GLAUCOMA SCREENING 72 + YR  03/10/2011         HPI:  Teresita iFgueroa is a 75 y o  female here for her Subsequent Wellness Visit      Patient Active Problem List   Diagnosis    Benign essential hypertension    Hyperlipidemia    Hypokalemia    Mild persistent asthma without complication    Non-toxic multinodular goiter    Urine frequency    Wellness examination     Past Medical History:   Diagnosis Date    Allergic rhinitis     last assessed: 12/12/2012    Hypokalemia     last assessed: 12/14/2012     Past Surgical History:   Procedure Laterality Date    COLPOSCOPY      DIAGNOSTIC LAPAROSCOPY  1987     Family History   Problem Relation Age of Onset    Lung cancer Mother     Asthma Family     Coronary artery disease Family     Thyroid disease Family      History   Smoking Status    Former Smoker    Quit date: 1986   Smokeless Tobacco    Never Used     History   Alcohol Use    Yes     Comment: social      History   Drug Use No       Current Outpatient Prescriptions   Medication Sig Dispense Refill    amLODIPine (NORVASC) 2 5 mg tablet Take 2 5 and 5mg daily 90 tablet 2    amLODIPine (NORVASC) 5 mg tablet Take 2 5 and 5mg daily 90 tablet 2    Calcium Carbonate-Vitamin D (CALCIUM 600+D) 600-200 MG-UNIT TABS Take 1 tablet by mouth daily      fluticasone (FLONASE) 50 mcg/act nasal spray 2 sprays into each nostril daily      fluticasone-salmeterol (ADVAIR DISKUS) 100-50 mcg/dose Inhale 1 puff daily      KLOR-CON M20 20 MEQ tablet TAKE 1 TABLET EVERY DAY 90 tablet 1    loratadine (CLARITIN) 10 mg tablet Take 1 tablet by mouth daily as needed      losartan-hydrochlorothiazide (HYZAAR) 100-25 MG per tablet Take 1 tablet by mouth daily      montelukast (SINGULAIR) 10 mg tablet Take 10 mg by mouth daily at bedtime      pravastatin (PRAVACHOL) 40 mg tablet Take 1 tablet (40 mg total) by mouth daily 90 tablet 2     No current facility-administered medications for this visit        Allergies   Allergen Reactions    Sulfa Antibiotics Other (See Comments) and Hives     Black spots under skin    Other Allergic Rhinitis     Immunization History   Administered Date(s) Administered    H1N1, All Formulations 12/15/2009    Influenza 10/01/2014, 09/29/2017    Influenza Split High Dose Preservative Free IM 09/29/2017    Influenza TIV (IM) 10/17/2008, 10/01/2014    Pneumococcal Conjugate 13-Valent 12/18/2014    Pneumococcal Polysaccharide PPV23 06/16/2010    Td (adult), adsorbed 04/05/2007    Tdap 12/03/2015, 01/10/2017    Zoster 09/11/2006, 01/01/2009, 01/01/2009, 01/01/2009, 09/09/2009       Patient Care Team:  Gianni Ryan MD as PCP - MD Shellie Soriano MD (Gastroenterology)      Medicare Screening Tests and Risk Assessments:  AWV Clinical     ISAR:   Previous hospitalizations?:  No       Once in a Lifetime Medicare Screening:   EKG performed?:  No    AAA screening performed? (if performed, please add date to Health Maintenance):  No       Medicare Screening Tests and Risk Assessment:   AAA Risk Assessment     Tobacco use (males only):  No   Age over 72 (males only):  No    Osteoporosis Risk Assessment     Female:  Yes   :  Yes :  No   Age over 48:  Yes Low body weight (<127lbs):  No   Tobacco use:  No Alcohol use:  No    PMHX of fractures:  No   HIV Risk Assessment    None indicated:  Yes        Drug and Alcohol Use:   Tobacco use    Cigarettes:  never smoker    Tobacco use duration    Tobacco Cessation Readiness    Alcohol use    Alcohol use:  occasional use    Alcohol Treatment Readiness   Illicit Drug Use    Drug type:  no sedative use       Diet & Exercise:   Diet   What is your diet?:  Regular   How many servings a day of the following:   Exercise    Do you currently exercise?:  yes    Type of exercise:  walking   Pilates        Cognitive Impairment Screening:   Anxiety screenings preformed:  Yes    Depression screening preformed:  Yes    Depression screening results:  negative for symptoms   Cognitive Impairment Screening    Do you have difficulty learning or retaining new information?:  No Do you have difficulty handling new tasks?:  No   Do you have difficulty with reasoning?:  No Do you have difficulty with spatial ability and orientation?:  No   Do you have difficulty with language?:  No Do you have difficulty with behavior?:  No       Functional Ability/Level of Safety:   Hearing    Hearing difficulties:  No    Hearing Impairment Assessment    Hearing status:  No impairment   Current Activities    Status:  unlimited ADL's, unlimited IADL's, unlimited social activities, unlimited driving   Help needed with the folllowing:    ADL    Fall Risk   Have you fallen in the last 12 months?:  No    Injury History   Polypharmacy:  No Antidepressant Use:  No   Sedative Use:  No Antihypertensive Use:  Yes   Previous Fall:  No Alcohol Use:  No   Deconditioning:  No Visual Impairment:  No   Cogitive Impairment:  No Mmobility Impairment:  No   Postural Hypotension:  No Urinary Incontinence:  No       Home Safety:   Home Safety Risk Factors   Unfamilar with surroundings:  No Uneven floors:  No   Stairs or handrail saftey risk:  No Loose rugs:  No   Household clutter:  No Poor household lighting:  No    Further evaluation needed:  No       Advanced Directives:   Advanced Directives    Living Will:  Yes    Patient's End of Life Decisions        Urinary Incontinence:   Do you have urinary incontinence?:  No        Glaucoma:            Provider Screening     Preventative Screening/Counseling:   Cardiovascular Screening/Counseling:   (Labs Q5 years, EKG optional one-time)   General:  Screening Current           Diabetes Screening/Counseling:   (2 tests/year if Pre-Diabetes or 1 test/year if no Diabetes)   General:  Screening Current           Colorectal Cancer Screening/Counseling:   (FOBT Q1 yr; Flex Sig Q4 yrs or Q10 yrs after Screening Colonoscopy; Screening Colonoscpy Q2 yrs High Risk or Q10 yrs Low Risk; Barium Enema Q2 yrs High Risk or Q4 yrs Low Risk)   General:  Screening Current           Prostate Cancer Screening/Counseling:   (Annual)          Breast Cancer Screening/Counseling:   (Baseline Age 28 - 43; Annual Age 36+)   General:  Screening Current          Cervical Cancer Screening/Counseling:   (Annual for High Risk or Childbearing Age with Abnormal Pap in Last 3 yrs; Every 2 all others)   General:  Screening Not Indicated           Osteoporosis Screening/Counseling:   (Every 2 Yrs if at risk or more if medically necessary)   General:  Patient Declines           AAA Screening/Counseling:   (Once per Lifetime with risk factors)     Age over 72 (males only):  No Tobacco use (males only):  No         Glaucoma Screening/Counseling:   (Annual)   General:  Screening Current          HIV Screening/Counseling:   (Voluntary; Once annually for high risk OR 3 times for Pregnancy at diagnosis of IUP; 3rd trimester; and at Labor   General:  Screening Not Indicated           Hepatitis C Screening:             Immunizations:   Influenza (annual): Influenza UTD This Year   Pneumococcal (Once in a Lifetime):  Lifetime Vaccine Completed   Zostavax (Medicare D Coverage, Pt >72 yo):  Zostavax Vaccine UTD   Tdap (Non-Medicare Wellness Visit required):   Tdap Vaccine UTD       Other Preventative Couseling (Non-Medicare Wellness Visit Required):       Referrals (Non-Medicare Wellness Visit Required):       Medical Equipment/Suppliers:               Physical Exam Constitutional: She is oriented to person, place, and time  She appears well-developed and well-nourished  HENT:   Head: Normocephalic and atraumatic  Right Ear: External ear normal    Left Ear: External ear normal    Mouth/Throat: Oropharynx is clear and moist    Eyes: Conjunctivae are normal    Neck: Neck supple  Cardiovascular: Normal rate, regular rhythm and normal heart sounds  No murmur heard  Pulmonary/Chest: Effort normal and breath sounds normal  No respiratory distress  She has no wheezes  She has no rales  Abdominal: Soft  Bowel sounds are normal  She exhibits no distension and no mass  There is no tenderness  There is no rebound and no guarding  Musculoskeletal: Normal range of motion  Neurological: She is alert and oriented to person, place, and time  Skin: Skin is warm and dry  Psychiatric: She has a normal mood and affect   Her behavior is normal  Judgment and thought content normal

## 2018-08-18 DIAGNOSIS — J45.31 MILD PERSISTENT ASTHMA WITH ACUTE EXACERBATION: Primary | ICD-10-CM

## 2018-08-18 DIAGNOSIS — J45.30 MILD PERSISTENT ASTHMA WITHOUT COMPLICATION: ICD-10-CM

## 2018-08-22 RX ORDER — MONTELUKAST SODIUM 10 MG/1
TABLET ORAL
Qty: 90 TABLET | Refills: 3 | Status: SHIPPED | OUTPATIENT
Start: 2018-08-22 | End: 2019-08-01 | Stop reason: SDUPTHER

## 2018-09-11 DIAGNOSIS — J30.9 ALLERGIC RHINITIS, UNSPECIFIED SEASONALITY, UNSPECIFIED TRIGGER: ICD-10-CM

## 2018-09-11 DIAGNOSIS — J45.30 MILD PERSISTENT ASTHMA WITHOUT COMPLICATION: Primary | ICD-10-CM

## 2018-09-11 RX ORDER — FLUTICASONE PROPIONATE 50 MCG
2 SPRAY, SUSPENSION (ML) NASAL DAILY
Qty: 48 G | Refills: 1 | Status: SHIPPED | OUTPATIENT
Start: 2018-09-11 | End: 2019-08-13 | Stop reason: SDUPTHER

## 2018-10-20 DIAGNOSIS — I10 HYPERTENSION, ESSENTIAL, BENIGN: Primary | ICD-10-CM

## 2018-10-21 RX ORDER — LOSARTAN POTASSIUM AND HYDROCHLOROTHIAZIDE 25; 100 MG/1; MG/1
TABLET ORAL
Qty: 90 TABLET | Refills: 3 | Status: SHIPPED | OUTPATIENT
Start: 2018-10-21 | End: 2019-10-13 | Stop reason: SDUPTHER

## 2019-01-23 ENCOUNTER — APPOINTMENT (OUTPATIENT)
Dept: LAB | Age: 74
End: 2019-01-23
Payer: COMMERCIAL

## 2019-01-23 DIAGNOSIS — I10 BENIGN ESSENTIAL HYPERTENSION: ICD-10-CM

## 2019-01-23 DIAGNOSIS — E04.2 NON-TOXIC MULTINODULAR GOITER: ICD-10-CM

## 2019-01-23 DIAGNOSIS — E78.2 MIXED HYPERLIPIDEMIA: ICD-10-CM

## 2019-01-23 LAB
ALBUMIN SERPL BCP-MCNC: 3.6 G/DL (ref 3.5–5)
ALP SERPL-CCNC: 63 U/L (ref 46–116)
ALT SERPL W P-5'-P-CCNC: 27 U/L (ref 12–78)
ANION GAP SERPL CALCULATED.3IONS-SCNC: 6 MMOL/L (ref 4–13)
AST SERPL W P-5'-P-CCNC: 17 U/L (ref 5–45)
BASOPHILS # BLD AUTO: 0.05 THOUSANDS/ΜL (ref 0–0.1)
BASOPHILS NFR BLD AUTO: 1 % (ref 0–1)
BILIRUB SERPL-MCNC: 0.61 MG/DL (ref 0.2–1)
BUN SERPL-MCNC: 20 MG/DL (ref 5–25)
CALCIUM SERPL-MCNC: 9 MG/DL (ref 8.3–10.1)
CHLORIDE SERPL-SCNC: 106 MMOL/L (ref 100–108)
CHOLEST SERPL-MCNC: 186 MG/DL (ref 50–200)
CO2 SERPL-SCNC: 25 MMOL/L (ref 21–32)
CREAT SERPL-MCNC: 1.03 MG/DL (ref 0.6–1.3)
EOSINOPHIL # BLD AUTO: 0.34 THOUSAND/ΜL (ref 0–0.61)
EOSINOPHIL NFR BLD AUTO: 6 % (ref 0–6)
ERYTHROCYTE [DISTWIDTH] IN BLOOD BY AUTOMATED COUNT: 14.1 % (ref 11.6–15.1)
GFR SERPL CREATININE-BSD FRML MDRD: 54 ML/MIN/1.73SQ M
GLUCOSE P FAST SERPL-MCNC: 91 MG/DL (ref 65–99)
HCT VFR BLD AUTO: 44.4 % (ref 34.8–46.1)
HDLC SERPL-MCNC: 52 MG/DL (ref 40–60)
HGB BLD-MCNC: 14.1 G/DL (ref 11.5–15.4)
IMM GRANULOCYTES # BLD AUTO: 0.02 THOUSAND/UL (ref 0–0.2)
IMM GRANULOCYTES NFR BLD AUTO: 0 % (ref 0–2)
LDLC SERPL CALC-MCNC: 121 MG/DL (ref 0–100)
LYMPHOCYTES # BLD AUTO: 2.18 THOUSANDS/ΜL (ref 0.6–4.47)
LYMPHOCYTES NFR BLD AUTO: 39 % (ref 14–44)
MCH RBC QN AUTO: 26.5 PG (ref 26.8–34.3)
MCHC RBC AUTO-ENTMCNC: 31.8 G/DL (ref 31.4–37.4)
MCV RBC AUTO: 84 FL (ref 82–98)
MONOCYTES # BLD AUTO: 0.57 THOUSAND/ΜL (ref 0.17–1.22)
MONOCYTES NFR BLD AUTO: 10 % (ref 4–12)
NEUTROPHILS # BLD AUTO: 2.48 THOUSANDS/ΜL (ref 1.85–7.62)
NEUTS SEG NFR BLD AUTO: 44 % (ref 43–75)
NONHDLC SERPL-MCNC: 134 MG/DL
NRBC BLD AUTO-RTO: 0 /100 WBCS
PLATELET # BLD AUTO: 202 THOUSANDS/UL (ref 149–390)
PMV BLD AUTO: 12.1 FL (ref 8.9–12.7)
POTASSIUM SERPL-SCNC: 3.4 MMOL/L (ref 3.5–5.3)
PROT SERPL-MCNC: 7.1 G/DL (ref 6.4–8.2)
RBC # BLD AUTO: 5.32 MILLION/UL (ref 3.81–5.12)
SODIUM SERPL-SCNC: 137 MMOL/L (ref 136–145)
TRIGL SERPL-MCNC: 64 MG/DL
TSH SERPL DL<=0.05 MIU/L-ACNC: 1.25 UIU/ML (ref 0.36–3.74)
WBC # BLD AUTO: 5.64 THOUSAND/UL (ref 4.31–10.16)

## 2019-01-23 PROCEDURE — 80053 COMPREHEN METABOLIC PANEL: CPT

## 2019-01-23 PROCEDURE — 84443 ASSAY THYROID STIM HORMONE: CPT

## 2019-01-23 PROCEDURE — 80061 LIPID PANEL: CPT

## 2019-01-23 PROCEDURE — 85025 COMPLETE CBC W/AUTO DIFF WBC: CPT

## 2019-01-23 PROCEDURE — 36415 COLL VENOUS BLD VENIPUNCTURE: CPT

## 2019-01-31 ENCOUNTER — OFFICE VISIT (OUTPATIENT)
Dept: INTERNAL MEDICINE CLINIC | Facility: CLINIC | Age: 74
End: 2019-01-31
Payer: COMMERCIAL

## 2019-01-31 VITALS
BODY MASS INDEX: 26.91 KG/M2 | DIASTOLIC BLOOD PRESSURE: 68 MMHG | WEIGHT: 157.6 LBS | HEART RATE: 79 BPM | SYSTOLIC BLOOD PRESSURE: 126 MMHG | OXYGEN SATURATION: 96 % | HEIGHT: 64 IN

## 2019-01-31 DIAGNOSIS — E87.6 HYPOKALEMIA: ICD-10-CM

## 2019-01-31 DIAGNOSIS — I10 BENIGN ESSENTIAL HYPERTENSION: Primary | ICD-10-CM

## 2019-01-31 DIAGNOSIS — E04.2 NON-TOXIC MULTINODULAR GOITER: ICD-10-CM

## 2019-01-31 DIAGNOSIS — J01.00 ACUTE NON-RECURRENT MAXILLARY SINUSITIS: ICD-10-CM

## 2019-01-31 DIAGNOSIS — E78.2 MIXED HYPERLIPIDEMIA: ICD-10-CM

## 2019-01-31 DIAGNOSIS — J45.30 MILD PERSISTENT ASTHMA WITHOUT COMPLICATION: ICD-10-CM

## 2019-01-31 PROBLEM — Z00.00 WELLNESS EXAMINATION: Status: RESOLVED | Noted: 2018-07-26 | Resolved: 2019-01-31

## 2019-01-31 PROCEDURE — 99214 OFFICE O/P EST MOD 30 MIN: CPT | Performed by: INTERNAL MEDICINE

## 2019-01-31 PROCEDURE — 3074F SYST BP LT 130 MM HG: CPT | Performed by: INTERNAL MEDICINE

## 2019-01-31 PROCEDURE — 1160F RVW MEDS BY RX/DR IN RCRD: CPT | Performed by: INTERNAL MEDICINE

## 2019-01-31 PROCEDURE — 3078F DIAST BP <80 MM HG: CPT | Performed by: INTERNAL MEDICINE

## 2019-01-31 RX ORDER — AMOXICILLIN AND CLAVULANATE POTASSIUM 875; 125 MG/1; MG/1
1 TABLET, FILM COATED ORAL EVERY 12 HOURS SCHEDULED
Qty: 14 TABLET | Refills: 0 | Status: SHIPPED | OUTPATIENT
Start: 2019-01-31 | End: 2019-02-07

## 2019-01-31 RX ORDER — ATORVASTATIN CALCIUM 40 MG/1
40 TABLET, FILM COATED ORAL DAILY
Qty: 90 TABLET | Refills: 1 | Status: SHIPPED | OUTPATIENT
Start: 2019-01-31 | End: 2019-07-21 | Stop reason: SDUPTHER

## 2019-01-31 RX ORDER — POTASSIUM CHLORIDE 20 MEQ/1
20 TABLET, EXTENDED RELEASE ORAL 2 TIMES DAILY
Qty: 180 TABLET | Refills: 1 | Status: SHIPPED | OUTPATIENT
Start: 2019-01-31 | End: 2020-01-13

## 2019-01-31 NOTE — PROGRESS NOTES
Assessment/Plan:    Non-toxic multinodular goiter  US 2020    Mild persistent asthma without complication  Controlled    Benign essential hypertension  Controlled    Hyperlipidemia  LDL goal <100  Switch to moderate intensity atorvastatin  She can finish her current pravastatin supply  Hypokalemia  Related to HCTZ  Continue this but increase potassium to 20meq BID    Start Augmentin for sinusitis     Problem List Items Addressed This Visit        Endocrine    Non-toxic multinodular goiter     US 2020            Respiratory    Mild persistent asthma without complication     Controlled            Cardiovascular and Mediastinum    Benign essential hypertension - Primary     Controlled         Relevant Orders    CBC and differential    Comprehensive metabolic panel    Lipid panel       Other    Hyperlipidemia     LDL goal <100  Switch to moderate intensity atorvastatin  She can finish her current pravastatin supply  Relevant Medications    atorvastatin (LIPITOR) 40 mg tablet    Other Relevant Orders    CBC and differential    Comprehensive metabolic panel    Lipid panel    Hypokalemia     Related to HCTZ  Continue this but increase potassium to 20meq BID         Relevant Medications    potassium chloride (KLOR-CON M20) 20 mEq tablet      Other Visit Diagnoses     Acute non-recurrent maxillary sinusitis        Relevant Medications    amoxicillin-clavulanate (AUGMENTIN) 875-125 mg per tablet            Subjective:      Patient ID: Marilee Jackson is a 75 y o  female  HPI  Here for a follow up  CBC normal K 3 4  Normal TSH  Doing well but sinuses are congested,pressure,+post nasal drip x 1 month  She is taking Claritin, Singulair, Flonase and Advair daily  She is on potassium 20meq daily and eats plenty of fruits that are high in potassium  Taking pravastatin 40mg  Recalls taking Atorvastatin in the past but this was changed to pravastatin at some point   She does not recall why but she does not think it caused her myalgias  The following portions of the patient's history were reviewed and updated as appropriate: allergies, current medications, past family history, past social history, past surgical history and problem list     Review of Systems   Constitutional: Negative for fatigue, fever and unexpected weight change  HENT: Positive for sinus pain and sinus pressure  Negative for ear pain, hearing loss and sore throat  Respiratory: Negative for cough, shortness of breath and wheezing  Cardiovascular: Negative for chest pain, palpitations and leg swelling  Gastrointestinal: Negative for abdominal pain, constipation, diarrhea, nausea and vomiting  Genitourinary: Positive for frequency (on HCTZ)  Negative for difficulty urinating and dysuria  Musculoskeletal: Negative for arthralgias and myalgias  Neurological: Negative for dizziness and headaches  Objective:      /68   Pulse 79   Ht 5' 4" (1 626 m)   Wt 71 5 kg (157 lb 9 6 oz)   LMP  (LMP Unknown)   SpO2 96%   BMI 27 05 kg/m²          Physical Exam   Constitutional: She is oriented to person, place, and time  She appears well-developed and well-nourished  HENT:   Head: Normocephalic and atraumatic  Right Ear: External ear normal    Left Ear: External ear normal    Mouth/Throat: Oropharynx is clear and moist    Eyes: Conjunctivae are normal    Neck: Neck supple  Cardiovascular: Normal rate, regular rhythm and normal heart sounds  No murmur heard  Pulmonary/Chest: Effort normal and breath sounds normal  No respiratory distress  She has no wheezes  She has no rales  Abdominal: Soft  Bowel sounds are normal  She exhibits no distension and no mass  There is no tenderness  There is no rebound and no guarding  Musculoskeletal: Normal range of motion  Neurological: She is alert and oriented to person, place, and time  Skin: Skin is warm and dry  Psychiatric: She has a normal mood and affect   Her behavior is normal  Judgment and thought content normal

## 2019-01-31 NOTE — ASSESSMENT & PLAN NOTE
LDL goal <100  Switch to moderate intensity atorvastatin  She can finish her current pravastatin supply

## 2019-02-04 ENCOUNTER — TELEPHONE (OUTPATIENT)
Dept: INTERNAL MEDICINE CLINIC | Facility: CLINIC | Age: 74
End: 2019-02-04

## 2019-02-04 DIAGNOSIS — Z12.31 BREAST CANCER SCREENING BY MAMMOGRAM: Primary | ICD-10-CM

## 2019-02-04 NOTE — TELEPHONE ENCOUNTER
Patient forgot to ask you for her order to get her screeing mammo done when she was in to visit  Patient gets it done at [facility]

## 2019-02-13 DIAGNOSIS — E87.6 HYPOKALEMIA: ICD-10-CM

## 2019-02-13 RX ORDER — POTASSIUM CHLORIDE 20 MEQ/1
20 TABLET, EXTENDED RELEASE ORAL 2 TIMES DAILY
Qty: 180 TABLET | Refills: 1 | Status: SHIPPED | OUTPATIENT
Start: 2019-02-13 | End: 2019-08-12

## 2019-02-26 ENCOUNTER — HOSPITAL ENCOUNTER (OUTPATIENT)
Dept: RADIOLOGY | Age: 74
Discharge: HOME | End: 2019-02-26
Payer: COMMERCIAL

## 2019-02-26 VITALS — WEIGHT: 154 LBS | BODY MASS INDEX: 26.29 KG/M2 | HEIGHT: 64 IN

## 2019-02-26 DIAGNOSIS — Z12.31 BREAST CANCER SCREENING BY MAMMOGRAM: ICD-10-CM

## 2019-02-26 PROCEDURE — 77067 SCR MAMMO BI INCL CAD: CPT

## 2019-03-08 DIAGNOSIS — J45.30 MILD PERSISTENT ASTHMA WITHOUT COMPLICATION: ICD-10-CM

## 2019-04-07 DIAGNOSIS — I10 HYPERTENSION, ESSENTIAL, BENIGN: ICD-10-CM

## 2019-04-08 RX ORDER — AMLODIPINE BESYLATE 2.5 MG/1
TABLET ORAL
Qty: 90 TABLET | Refills: 2 | Status: SHIPPED | OUTPATIENT
Start: 2019-04-08 | End: 2019-12-23 | Stop reason: SDUPTHER

## 2019-05-14 DIAGNOSIS — I10 HYPERTENSION, ESSENTIAL, BENIGN: ICD-10-CM

## 2019-05-14 RX ORDER — AMLODIPINE BESYLATE 5 MG/1
TABLET ORAL
Qty: 90 TABLET | Refills: 2 | Status: SHIPPED | OUTPATIENT
Start: 2019-05-14 | End: 2020-01-31

## 2019-07-21 DIAGNOSIS — E78.2 MIXED HYPERLIPIDEMIA: ICD-10-CM

## 2019-07-22 RX ORDER — ATORVASTATIN CALCIUM 40 MG/1
TABLET, FILM COATED ORAL
Qty: 90 TABLET | Refills: 1 | Status: SHIPPED | OUTPATIENT
Start: 2019-07-22 | End: 2020-01-13

## 2019-07-31 DIAGNOSIS — J45.30 MILD PERSISTENT ASTHMA WITHOUT COMPLICATION: ICD-10-CM

## 2019-08-01 RX ORDER — MONTELUKAST SODIUM 10 MG/1
TABLET ORAL
Qty: 90 TABLET | Refills: 3 | Status: SHIPPED | OUTPATIENT
Start: 2019-08-01 | End: 2020-01-01

## 2019-08-07 ENCOUNTER — APPOINTMENT (OUTPATIENT)
Dept: LAB | Age: 74
End: 2019-08-07
Payer: COMMERCIAL

## 2019-08-07 DIAGNOSIS — I10 BENIGN ESSENTIAL HYPERTENSION: ICD-10-CM

## 2019-08-07 DIAGNOSIS — E78.2 MIXED HYPERLIPIDEMIA: ICD-10-CM

## 2019-08-07 LAB
ALBUMIN SERPL BCP-MCNC: 4.2 G/DL (ref 3.5–5)
ALP SERPL-CCNC: 76 U/L (ref 46–116)
ALT SERPL W P-5'-P-CCNC: 29 U/L (ref 12–78)
ANION GAP SERPL CALCULATED.3IONS-SCNC: 8 MMOL/L (ref 4–13)
AST SERPL W P-5'-P-CCNC: 22 U/L (ref 5–45)
BASOPHILS # BLD AUTO: 0.05 THOUSANDS/ΜL (ref 0–0.1)
BASOPHILS NFR BLD AUTO: 1 % (ref 0–1)
BILIRUB SERPL-MCNC: 0.57 MG/DL (ref 0.2–1)
BUN SERPL-MCNC: 18 MG/DL (ref 5–25)
CALCIUM SERPL-MCNC: 9.4 MG/DL (ref 8.3–10.1)
CHLORIDE SERPL-SCNC: 107 MMOL/L (ref 100–108)
CHOLEST SERPL-MCNC: 141 MG/DL (ref 50–200)
CO2 SERPL-SCNC: 28 MMOL/L (ref 21–32)
CREAT SERPL-MCNC: 1.15 MG/DL (ref 0.6–1.3)
EOSINOPHIL # BLD AUTO: 0.56 THOUSAND/ΜL (ref 0–0.61)
EOSINOPHIL NFR BLD AUTO: 9 % (ref 0–6)
ERYTHROCYTE [DISTWIDTH] IN BLOOD BY AUTOMATED COUNT: 14 % (ref 11.6–15.1)
GFR SERPL CREATININE-BSD FRML MDRD: 47 ML/MIN/1.73SQ M
GLUCOSE P FAST SERPL-MCNC: 97 MG/DL (ref 65–99)
HCT VFR BLD AUTO: 44.9 % (ref 34.8–46.1)
HDLC SERPL-MCNC: 67 MG/DL (ref 40–60)
HGB BLD-MCNC: 14.2 G/DL (ref 11.5–15.4)
IMM GRANULOCYTES # BLD AUTO: 0.02 THOUSAND/UL (ref 0–0.2)
IMM GRANULOCYTES NFR BLD AUTO: 0 % (ref 0–2)
LDLC SERPL CALC-MCNC: 60 MG/DL (ref 0–100)
LYMPHOCYTES # BLD AUTO: 2.17 THOUSANDS/ΜL (ref 0.6–4.47)
LYMPHOCYTES NFR BLD AUTO: 34 % (ref 14–44)
MCH RBC QN AUTO: 26.5 PG (ref 26.8–34.3)
MCHC RBC AUTO-ENTMCNC: 31.6 G/DL (ref 31.4–37.4)
MCV RBC AUTO: 84 FL (ref 82–98)
MONOCYTES # BLD AUTO: 0.63 THOUSAND/ΜL (ref 0.17–1.22)
MONOCYTES NFR BLD AUTO: 10 % (ref 4–12)
NEUTROPHILS # BLD AUTO: 2.89 THOUSANDS/ΜL (ref 1.85–7.62)
NEUTS SEG NFR BLD AUTO: 46 % (ref 43–75)
NONHDLC SERPL-MCNC: 74 MG/DL
NRBC BLD AUTO-RTO: 0 /100 WBCS
PLATELET # BLD AUTO: 189 THOUSANDS/UL (ref 149–390)
PMV BLD AUTO: 12.1 FL (ref 8.9–12.7)
POTASSIUM SERPL-SCNC: 3.8 MMOL/L (ref 3.5–5.3)
PROT SERPL-MCNC: 7.3 G/DL (ref 6.4–8.2)
RBC # BLD AUTO: 5.36 MILLION/UL (ref 3.81–5.12)
SODIUM SERPL-SCNC: 143 MMOL/L (ref 136–145)
TRIGL SERPL-MCNC: 72 MG/DL
WBC # BLD AUTO: 6.32 THOUSAND/UL (ref 4.31–10.16)

## 2019-08-07 PROCEDURE — 80061 LIPID PANEL: CPT

## 2019-08-07 PROCEDURE — 80053 COMPREHEN METABOLIC PANEL: CPT

## 2019-08-07 PROCEDURE — 85025 COMPLETE CBC W/AUTO DIFF WBC: CPT

## 2019-08-07 PROCEDURE — 36415 COLL VENOUS BLD VENIPUNCTURE: CPT

## 2019-08-13 ENCOUNTER — OFFICE VISIT (OUTPATIENT)
Dept: INTERNAL MEDICINE CLINIC | Facility: CLINIC | Age: 74
End: 2019-08-13
Payer: COMMERCIAL

## 2019-08-13 VITALS
RESPIRATION RATE: 16 BRPM | WEIGHT: 157 LBS | BODY MASS INDEX: 26.8 KG/M2 | SYSTOLIC BLOOD PRESSURE: 128 MMHG | HEIGHT: 64 IN | OXYGEN SATURATION: 95 % | DIASTOLIC BLOOD PRESSURE: 80 MMHG | HEART RATE: 64 BPM

## 2019-08-13 DIAGNOSIS — I10 BENIGN ESSENTIAL HYPERTENSION: ICD-10-CM

## 2019-08-13 DIAGNOSIS — Z12.11 SCREENING FOR COLON CANCER: ICD-10-CM

## 2019-08-13 DIAGNOSIS — E04.2 NON-TOXIC MULTINODULAR GOITER: ICD-10-CM

## 2019-08-13 DIAGNOSIS — Z00.00 MEDICARE ANNUAL WELLNESS VISIT, SUBSEQUENT: ICD-10-CM

## 2019-08-13 DIAGNOSIS — J30.9 ALLERGIC RHINITIS, UNSPECIFIED SEASONALITY, UNSPECIFIED TRIGGER: ICD-10-CM

## 2019-08-13 DIAGNOSIS — J45.30 MILD PERSISTENT ASTHMA WITHOUT COMPLICATION: ICD-10-CM

## 2019-08-13 DIAGNOSIS — Z12.31 ENCOUNTER FOR SCREENING MAMMOGRAM FOR BREAST CANCER: ICD-10-CM

## 2019-08-13 DIAGNOSIS — E78.2 MIXED HYPERLIPIDEMIA: Primary | ICD-10-CM

## 2019-08-13 PROCEDURE — 1160F RVW MEDS BY RX/DR IN RCRD: CPT | Performed by: INTERNAL MEDICINE

## 2019-08-13 PROCEDURE — G0439 PPPS, SUBSEQ VISIT: HCPCS | Performed by: INTERNAL MEDICINE

## 2019-08-13 PROCEDURE — 3074F SYST BP LT 130 MM HG: CPT | Performed by: INTERNAL MEDICINE

## 2019-08-13 PROCEDURE — 1036F TOBACCO NON-USER: CPT | Performed by: INTERNAL MEDICINE

## 2019-08-13 PROCEDURE — 99214 OFFICE O/P EST MOD 30 MIN: CPT | Performed by: INTERNAL MEDICINE

## 2019-08-13 PROCEDURE — 3725F SCREEN DEPRESSION PERFORMED: CPT | Performed by: INTERNAL MEDICINE

## 2019-08-13 PROCEDURE — 1101F PT FALLS ASSESS-DOCD LE1/YR: CPT | Performed by: INTERNAL MEDICINE

## 2019-08-13 PROCEDURE — 1170F FXNL STATUS ASSESSED: CPT | Performed by: INTERNAL MEDICINE

## 2019-08-13 PROCEDURE — 1125F AMNT PAIN NOTED PAIN PRSNT: CPT | Performed by: INTERNAL MEDICINE

## 2019-08-13 RX ORDER — FLUTICASONE PROPIONATE 50 MCG
1 SPRAY, SUSPENSION (ML) NASAL DAILY
Qty: 48 G | Refills: 1 | Status: SHIPPED | OUTPATIENT
Start: 2019-08-13 | End: 2020-04-14

## 2019-08-13 NOTE — PATIENT INSTRUCTIONS
Obesity   AMBULATORY CARE:   Obesity  is when your body mass index (BMI) is greater than 30  Your healthcare provider will use your height and weight to measure your BMI  The risks of obesity include  many health problems, such as injuries or physical disability  You may need tests to check for the following:  · Diabetes     · High blood pressure or high cholesterol     · Heart disease     · Gallbladder or liver disease     · Cancer of the colon, breast, prostate, liver, or kidney     · Sleep apnea     · Arthritis or gout  Seek care immediately if:   · You have a severe headache, confusion, or difficulty speaking  · You have weakness on one side of your body  · You have chest pain, sweating, or shortness of breath  Contact your healthcare provider if:   · You have symptoms of gallbladder or liver disease, such as pain in your upper abdomen  · You have knee or hip pain and discomfort while walking  · You have symptoms of diabetes, such as intense hunger and thirst, and frequent urination  · You have symptoms of sleep apnea, such as snoring or daytime sleepiness  · You have questions or concerns about your condition or care  Treatment for obesity  focuses on helping you lose weight to improve your health  Even a small decrease in BMI can reduce the risk for many health problems  Your healthcare provider will help you set a weight-loss goal   · Lifestyle changes  are the first step in treating obesity  These include making healthy food choices and getting regular physical activity  Your healthcare provider may suggest a weight-loss program that involves coaching, education, and therapy  · Medicine  may help you lose weight when it is used with a healthy diet and physical activity  · Surgery  can help you lose weight if you are very obese and have other health problems  There are several types of weight-loss surgery  Ask your healthcare provider for more information    Be successful losing weight:   · Set small, realistic goals  An example of a small goal is to walk for 20 minutes 5 days a week  Anther goal is to lose 5% of your body weight  · Tell friends, family members, and coworkers about your goals  and ask for their support  Ask a friend to lose weight with you, or join a weight-loss support group  · Identify foods or triggers that may cause you to overeat , and find ways to avoid them  Remove tempting high-calorie foods from your home and workplace  Place a bowl of fresh fruit on your kitchen counter  If stress causes you to eat, then find other ways to cope with stress  · Keep a diary to track what you eat and drink  Also write down how many minutes of physical activity you do each day  Weigh yourself once a week and record it in your diary  Eating changes: You will need to eat 500 to 1,000 fewer calories each day than you currently eat to lose 1 to 2 pounds a week  The following changes will help you cut calories:  · Eat smaller portions  Use small plates, no larger than 9 inches in diameter  Fill your plate half full of fruits and vegetables  Measure your food using measuring cups until you know what a serving size looks like  · Eat 3 meals and 1 or 2 snacks each day  Plan your meals in advance  Chacho Powers and eat at home most of the time  Eat slowly  · Eat fruits and vegetables at every meal   They are low in calories and high in fiber, which makes you feel full  Do not add butter, margarine, or cream sauce to vegetables  Use herbs to season steamed vegetables  · Eat less fat and fewer fried foods  Eat more baked or grilled chicken and fish  These protein sources are lower in calories and fat than red meat  Limit fast food  Dress your salads with olive oil and vinegar instead of bottled dressing  · Limit the amount of sugar you eat  Do not drink sugary beverages  Limit alcohol  Activity changes:  Physical activity is good for your body in many ways   It helps you burn calories and build strong muscles  It decreases stress and depression, and improves your mood  It can also help you sleep better  Talk to your healthcare provider before you begin an exercise program   · Exercise for at least 30 minutes 5 days a week  Start slowly  Set aside time each day for physical activity that you enjoy and that is convenient for you  It is best to do both weight training and an activity that increases your heart rate, such as walking, bicycling, or swimming  · Find ways to be more active  Do yard work and housecleaning  Walk up the stairs instead of using elevators  Spend your leisure time going to events that require walking, such as outdoor festivals or fairs  This extra physical activity can help you lose weight and keep it off  Follow up with your healthcare provider as directed: You may need to meet with a dietitian  Write down your questions so you remember to ask them during your visits  © 2017 [facility] Information is for End User's use only and may not be sold, redistributed or otherwise used for commercial purposes  All illustrations and images included in CareNotes® are the copyrighted property of [employer]  or Jean Carlos Perez  The above information is an  only  It is not intended as medical advice for individual conditions or treatments  Talk to your doctor, nurse or pharmacist before following any medical regimen to see if it is safe and effective for you  Urinary Incontinence   WHAT YOU NEED TO KNOW:   What is urinary incontinence? Urinary incontinence (UI) is when you lose control of your bladder  What causes UI? UI occurs because your bladder cannot store or empty urine properly  The following are the most common types of UI:  · Stress incontinence  is when you leak urine due to increased bladder pressure  This may happen when you cough, sneeze, or exercise       · Urge incontinence  is when you feel the need to urinate right away and leak urine accidentally  · Mixed incontinence  is when you have both stress and urge UI  What are the signs and symptoms of UI?   · You feel like your bladder does not empty completely when you urinate  · You urinate often and need to urinate immediately  · You leak urine when you sleep, or you wake up with the urge to urinate  · You leak urine when you cough, sneeze, exercise, or laugh  How is UI diagnosed? Your healthcare provider will ask how often you leak urine and whether you have stress or urge symptoms  Tell him which medicines you take, how often you urinate, and how much liquid you drink each day  You may need any of the following tests:  · Urine tests  may show infection or kidney function  · A pelvic exam  may be done to check for blockages  A pelvic exam will also show if your bladder, uterus, or other organs have moved out of place  · An x-ray, ultrasound, or CT  may show problems with parts of your urinary system  You may be given contrast liquid to help your organs show up better in the pictures  Tell the healthcare provider if you have ever had an allergic reaction to contrast liquid  Do not enter the MRI room with anything metal  Metal can cause serious injury  Tell the healthcare provider if you have any metal in or on your body  · A bladder scan  will show how much urine is left in your bladder after you urinate  You will be asked to urinate and then healthcare providers will use a small ultrasound machine to check the urine left in your bladder  · Cystometry  is used to check the function of your urinary system  Your healthcare provider checks the pressure in your bladder while filling it with fluid  Your bladder pressure may also be tested when your bladder is full and while you urinate  How is UI treated? · Medicines  can help strengthen your bladder control      · Electrical stimulation  is used to send a small amount of electrical energy to your pelvic floor muscles  This helps control your bladder function  Electrodes may be placed outside your body or in your rectum  For women, the electrodes may be placed in the vagina  · A bulking agent  may be injected into the wall of your urethra to make it thicker  This helps keep your urethra closed and decreases urine leakage  · Devices  such as a clamp, pessary, or tampon may help stop urine leaks  Ask your healthcare provider for more information about these and other devices  · Surgery  may be needed if other treatments do not work  Several types of surgery can help improve your bladder control  Ask your healthcare provider for more information about the surgery you may need  How can I manage my symptoms? · Do pelvic muscle exercises often  Your pelvic muscles help you stop urinating  Squeeze these muscles tight for 5 seconds, then relax for 5 seconds  Gradually work up to squeezing for 10 seconds  Do 3 sets of 15 repetitions a day, or as directed  This will help strengthen your pelvic muscles and improve bladder control  · A catheter  may be used to help empty your bladder  A catheter is a tiny, plastic tube that is put into your bladder to drain your urine  Your healthcare provider may tell you to use a catheter to prevent your bladder from getting too full and leaking urine  · Keep a UI record  Write down how often you leak urine and how much you leak  Make a note of what you were doing when you leaked urine  · Train your bladder  Go to the bathroom at set times, such as every 2 hours, even if you do not feel the urge to go  You can also try to hold your urine when you feel the urge to go  For example, hold your urine for 5 minutes when you feel the urge to go  As that becomes easier, hold your urine for 10 minutes  · Drink liquids as directed  Ask your healthcare provider how much liquid to drink each day and which liquids are best for you   You may need to limit the amount of liquid you drink to help control your urine leakage  Limit or do not have drinks that contain caffeine or alcohol  Do not drink any liquid right before you go to bed  · Prevent constipation  Eat a variety of high-fiber foods  Good examples are high-fiber cereals, beans, vegetables, and whole-grain breads  Prune juice may help make your bowel movement softer  Walking is the best way to trigger your intestines to have a bowel movement  · Exercise regularly and maintain a healthy weight  Ask your healthcare provider how much you should weigh and about the best exercise plan for you  Weight loss and exercise will decrease pressure on your bladder and help you control your leakage  Ask him to help you create a weight loss plan if you are overweight  When should I seek immediate care? · You have severe pain  · You are confused or cannot think clearly  When should I contact my healthcare provider? · You have a fever  · You see blood in your urine  · You have pain when you urinate  · You have new or worse pain, even after treatment  · Your mouth feels dry or you have vision changes  · Your urine is cloudy or smells bad  · You have questions or concerns about your condition or care  CARE AGREEMENT:   You have the right to help plan your care  Learn about your health condition and how it may be treated  Discuss treatment options with your caregivers to decide what care you want to receive  You always have the right to refuse treatment  The above information is an  only  It is not intended as medical advice for individual conditions or treatments  Talk to your doctor, nurse or pharmacist before following any medical regimen to see if it is safe and effective for you  © 2017 [address] Information is for End User's use only and may not be sold, redistributed or otherwise used for commercial purposes   All illustrations and images included in CareNotes® are the copyrighted property of [employer]  or Jean Carlos Perez  Cigarette Smoking and Your Health   AMBULATORY CARE:   Risks to your health if you smoke:  Nicotine and other chemicals found in tobacco damage every cell in your body  Even if you are a light smoker, you have an increased risk for cancer, heart disease, and lung disease  If you are pregnant or have diabetes, smoking increases your risk for complications  Benefits to your health if you stop smoking:   · You decrease respiratory symptoms such as coughing, wheezing, and shortness of breath  · You reduce your risk for cancers of the lung, mouth, throat, kidney, bladder, pancreas, stomach, and cervix  If you already have cancer, you increase the benefits of chemotherapy  You also reduce your risk for cancer returning or a second cancer from developing  · You reduce your risk for heart disease, blood clots, heart attack, and stroke  · You reduce your risk for lung infections, and diseases such as pneumonia, asthma, chronic bronchitis, and emphysema  · Your circulation improves  More oxygen can be delivered to your body  If you have diabetes, you lower your risk for complications, such as kidney, artery, and eye diseases  You also lower your risk for nerve damage  Nerve damage can lead to amputations, poor vision, and blindness  · You improve your body's ability to heal and to fight infections  Benefits to the health of others if you stop smoking:  Tobacco is harmful to nonsmokers who breathe in your secondhand smoke  The following are ways the health of others around you may improve when you stop smoking:  · You lower the risks for lung cancer and heart disease in nonsmoking adults  · If you are pregnant, you lower the risk for miscarriage, early delivery, low birth weight, and stillbirth  You also lower your baby's risk for SIDS, obesity, developmental delay, and neurobehavioral problems, such as ADHD  · If you have children, you lower their risk for ear infections, colds, pneumonia, bronchitis, and asthma  For more information and support to stop smoking:   · Smokefree  gov  Phone: 8- 914 - 357-8103  Web Address: www smokefree  gov  Follow up with your healthcare provider as directed:  Write down your questions so you remember to ask them during your visits  © 2017 [address] Information is for End User's use only and may not be sold, redistributed or otherwise used for commercial purposes  All illustrations and images included in CareNotes® are the copyrighted property of [employer]  or Jean Carlos Perez  The above information is an  only  It is not intended as medical advice for individual conditions or treatments  Talk to your doctor, nurse or pharmacist before following any medical regimen to see if it is safe and effective for you  Fall Prevention   AMBULATORY CARE:   Fall prevention  includes ways to make your home and other areas safer  It also includes ways you can move more carefully to prevent a fall  Health conditions that cause changes in your blood pressure, vision, or muscle strength and coordination may increase your risk for falls  Medicines may also increase your risk for falls if they make you dizzy, weak, or sleepy  Call 911 or have someone else call if:   · You have fallen and are unconscious  · You have fallen and cannot move part of your body  Contact your healthcare provider if:   · You have fallen and have pain or a headache  · You have questions or concerns about your condition or care  Fall prevention tips:   · Stand or sit up slowly  This may help you keep your balance and prevent falls  · Use assistive devices as directed  Your healthcare provider may suggest that you use a cane or walker to help you keep your balance  You may need to have grab bars put in your bathroom near the toilet or in the shower      · Wear shoes that fit well and have soles that   Wear shoes both inside and outside  Use slippers with good   Do not wear shoes with high heels  · Wear a personal alarm  This is a device that allows you to call 911 if you fall and need help  Ask your healthcare provider for more information  · Stay active  Exercise can help strengthen your muscles and improve your balance  Your healthcare provider may recommend water aerobics or walking  He or she may also recommend physical therapy to improve your coordination  Never start an exercise program without talking to your healthcare provider first      · Manage your medical conditions  Keep all appointments with your healthcare providers  Visit your eye doctor as directed  Home safety tips:   · Add items to prevent falls in the bathroom  Put nonslip strips on your bath or shower floor to prevent you from slipping  Use a bath mat if you do not have carpet in the bathroom  This will prevent you from falling when you step out of the bath or shower  Use a shower seat so you do not need to stand while you shower  Sit on the toilet or a chair in your bathroom to dry yourself and put on clothing  This will prevent you from losing your balance from drying or dressing yourself while you are standing  · Keep paths clear  Remove books, shoes, and other objects from walkways and stairs  Place cords for telephones and lamps out of the way so that you do not need to walk over them  Tape them down if you cannot move them  Remove small rugs  If you cannot remove a rug, secure it with double-sided tape  This will prevent you from tripping  · Install bright lights in your home  Use night lights to help light paths to the bathroom or kitchen  Always turn on the light before you start walking  · Keep items you use often on shelves within reach  Do not use a step stool to help you reach an item  · Paint or place reflective tape on the edges of your stairs    This will help you see the stairs better  Follow up with your healthcare provider as directed:  Write down your questions so you remember to ask them during your visits  © 2017 [address] Information is for End User's use only and may not be sold, redistributed or otherwise used for commercial purposes  All illustrations and images included in CareNotes® are the copyrighted property of [employer]  or Jean Carlos Perez  The above information is an  only  It is not intended as medical advice for individual conditions or treatments  Talk to your doctor, nurse or pharmacist before following any medical regimen to see if it is safe and effective for you  Advance Directives   WHAT YOU NEED TO KNOW:   What are advance directives? Advance directives are legal documents that state your wishes and plans for medical care  These plans are made ahead of time in case you lose your ability to make decisions for yourself  Advance directives can apply to any medical decision, such as the treatments you want, and if you want to donate organs  What are the types of advance directives? There are many types of advance directives, and each state has rules about how to use them  You may choose a combination of any of the following:  · Living will: This is a written record of the treatment you want  You can also choose which treatments you do not want, which to limit, and which to stop at a certain time  This includes surgery, medicine, IV fluid, and tube feedings  · Durable power of  for healthcare [facility]): This is a written record that states who you want to make healthcare choices for you when you are unable to make them for yourself  This person, called a proxy, is usually a family member or a friend  You may choose more than 1 proxy  · Do not resuscitate (DNR) order:  A DNR order is used in case your heart stops beating or you stop breathing   It is a request not to have certain forms of treatment, such as CPR  A DNR order may be included in other types of advance directives  · Medical directive: This covers the care that you want if you are in a coma, near death, or unable to make decisions for yourself  You can list the treatments you want for each condition  Treatment may include pain medicine, surgery, blood transfusions, dialysis, IV or tube feedings, and a ventilator (breathing machine)  · Values history: This document has questions about your views, beliefs, and how you feel and think about life  This information can help others choose the care that you would choose  Why are advance directives important? An advance directive helps you control your care  Although spoken wishes may be used, it is better to have your wishes written down  Spoken wishes can be misunderstood, or not followed  Treatments may be given even if you do not want them  An advance directive may make it easier for your family to make difficult choices about your care  How do I decide what to put in my advance directives? · Make informed decisions:  Make sure you fully understand treatments or care you may receive  Think about the benefits and problems your decisions could cause for you or your family  Talk to healthcare providers if you have concerns or questions before you write down your wishes  You may also want to talk with your [employer] or , or a   Check your state laws to make sure that what you put in your advance directive is legal      · Sign all forms:  Sign and date your advance directive when you have finished  You may also need 2 witnesses to sign the forms  Witnesses cannot be your doctor or his staff, your spouse, heirs or beneficiaries, people you owe money to, or your chosen proxy  Talk to your family, proxy, and healthcare providers about your advance directive  Give each person a copy, and keep one for yourself in a place you can get to easily   Do not keep it hidden or locked away  · Review and revise your plans: You can revise your advance directive at any time, as long as you are able to make decisions  Review your plan every year, and when there are changes in your life, or your health  When you make changes, let your family, proxy, and healthcare providers know  Give each a new copy  Where can I find more information? · [employer]  [address] , [place] [address]  Phone: 0- 598 - 130-1137  Phone: 2- 231 - 136-3052  Web Address: http://www  aafp org  · [address] [facility])  [address] [address] [facility], [address]  Phone: [phone]  Phone: [phone]  Web Address: Renetta echeverria  CARE AGREEMENT:   You have the right to help plan your care  To help with this plan, you must learn about your health condition and treatment options  You must also learn about advance directives and how they are used  Work with your healthcare providers to decide what care will be used to treat you  You always have the right to refuse treatment  The above information is an  only  It is not intended as medical advice for individual conditions or treatments  Talk to your doctor, nurse or pharmacist before following any medical regimen to see if it is safe and effective for you  © 2017 [address] Information is for End User's use only and may not be sold, redistributed or otherwise used for commercial purposes  All illustrations and images included in CareNotes® are the copyrighted property of [employer] M [employer]  or Jean Carlos Perez

## 2019-08-13 NOTE — PROGRESS NOTES
Assessment/Plan:    Non-toxic multinodular goiter  US in 2020    Mild persistent asthma without complication  Controlled    Benign essential hypertension  Controlled    Hyperlipidemia  Controlled now on atorvastatin         Problem List Items Addressed This Visit        Endocrine    Non-toxic multinodular goiter     US in 2020         Relevant Orders    TSH, 3rd generation with Free T4 reflex       Respiratory    Mild persistent asthma without complication     Controlled            Cardiovascular and Mediastinum    Benign essential hypertension     Controlled         Relevant Orders    CBC and differential    Comprehensive metabolic panel    Lipid panel    TSH, 3rd generation with Free T4 reflex       Other    Hyperlipidemia - Primary     Controlled now on atorvastatin         Relevant Orders    CBC and differential    Comprehensive metabolic panel    Lipid panel    TSH, 3rd generation with Free T4 reflex      Other Visit Diagnoses     Screening for colon cancer        Relevant Orders    Ambulatory referral to Gastroenterology    Allergic rhinitis, unspecified seasonality, unspecified trigger        Relevant Medications    fluticasone (FLONASE) 50 mcg/act nasal spray    Encounter for screening mammogram for breast cancer        Relevant Orders    Mammo screening bilateral w cad    Medicare annual wellness visit, subsequent                Subjective:      Patient ID: Perez Ca is a 75 y o  female  HPI  Here for a follow up  Recent labs reviewed LDL down to 60 from 120s on atorvastastin  She was on pravastatin until January  Denies myalgias  Chronic allergic rhinitis, asthma on maintenance inhalers, Singulair and INS    The following portions of the patient's history were reviewed and updated as appropriate: allergies, current medications, past family history, past medical history, past social history and past surgical history      Review of Systems   Constitutional: Negative for fatigue, fever and unexpected weight change  HENT: Negative for ear pain, hearing loss, sinus pressure, sinus pain and sore throat  Respiratory: Negative for cough, shortness of breath and wheezing  Cardiovascular: Negative for chest pain, palpitations and leg swelling  Gastrointestinal: Negative for abdominal pain, constipation, diarrhea, nausea and vomiting  Musculoskeletal: Negative for arthralgias and myalgias  Neurological: Negative for dizziness and headaches  Objective:      /80   Pulse 64   Resp 16   Ht 5' 4" (1 626 m)   Wt 71 2 kg (157 lb)   LMP  (LMP Unknown)   SpO2 95%   BMI 26 95 kg/m²          Physical Exam   Constitutional: She is oriented to person, place, and time  She appears well-developed and well-nourished  HENT:   Head: Normocephalic and atraumatic  Right Ear: External ear normal    Left Ear: External ear normal    Mouth/Throat: Oropharynx is clear and moist    Eyes: Conjunctivae are normal    Neck: Neck supple  Cardiovascular: Normal rate, regular rhythm and normal heart sounds  No murmur heard  Pulmonary/Chest: Effort normal and breath sounds normal  No respiratory distress  She has no wheezes  She has no rales  Abdominal: Soft  Bowel sounds are normal  She exhibits no distension and no mass  There is no tenderness  There is no rebound and no guarding  Musculoskeletal: Normal range of motion  Neurological: She is alert and oriented to person, place, and time  Skin: Skin is warm and dry  Psychiatric: She has a normal mood and affect   Her behavior is normal  Judgment and thought content normal

## 2019-08-13 NOTE — PROGRESS NOTES
Assessment and Plan:     Problem List Items Addressed This Visit        Endocrine    Non-toxic multinodular goiter       Respiratory    Mild persistent asthma without complication       Cardiovascular and Mediastinum    Benign essential hypertension       Other    Hyperlipidemia - Primary      Other Visit Diagnoses     Screening for colon cancer        Relevant Orders    Ambulatory referral to Gastroenterology    Allergic rhinitis, unspecified seasonality, unspecified trigger        Relevant Medications    fluticasone (FLONASE) 50 mcg/act nasal spray    Encounter for screening mammogram for breast cancer        Relevant Orders    Mammo screening bilateral w cad    Medicare annual wellness visit, subsequent             History of Present Illness:     Patient presents for Medicare Annual Wellness visit    Patient Care Team:  Clay Muniz MD as PCP - General  MD Shwetha Connor MD (Gastroenterology)     Problem List:     Patient Active Problem List   Diagnosis    Benign essential hypertension    Hyperlipidemia    Hypokalemia    Mild persistent asthma without complication    Non-toxic multinodular goiter    Urine frequency      Past Medical and Surgical History:     Past Medical History:   Diagnosis Date    Allergic rhinitis     last assessed: 2012    Hypokalemia     last assessed: 2012     Past Surgical History:   Procedure Laterality Date    COLPOSCOPY      DIAGNOSTIC LAPAROSCOPY        Family History:     Family History   Problem Relation Age of Onset    Lung cancer Mother     Asthma Family     Coronary artery disease Family     Thyroid disease Family       Social History:     Social History     Tobacco Use   Smoking Status Former Smoker    Last attempt to quit:     Years since quittin 6   Smokeless Tobacco Never Used     Social History     Substance and Sexual Activity   Alcohol Use Yes    Comment: social     Social History     Substance and Sexual Activity   Drug Use No      Medications and Allergies:     Current Outpatient Medications   Medication Sig Dispense Refill    ADVAIR DISKUS 100-50 MCG/DOSE inhaler TAKE 1 PUFF BY MOUTH EVERY  Inhaler 1    amLODIPine (NORVASC) 2 5 mg tablet TAKE 1 TABLET BY MOUTH EVERY DAY 90 tablet 2    amLODIPine (NORVASC) 5 mg tablet Take 2 5 and 5mg daily 90 tablet 2    atorvastatin (LIPITOR) 40 mg tablet TAKE 1 TABLET BY MOUTH EVERY DAY 90 tablet 1    Calcium Carbonate-Vitamin D (CALCIUM 600+D) 600-200 MG-UNIT TABS Take 1 tablet by mouth daily      fluticasone (FLONASE) 50 mcg/act nasal spray 1 spray into each nostril daily 48 g 1    loratadine (CLARITIN) 10 mg tablet Take 1 tablet by mouth daily as needed      losartan-hydrochlorothiazide (HYZAAR) 100-25 MG per tablet TAKE 1 TABLET DAILY  90 tablet 3    montelukast (SINGULAIR) 10 mg tablet TAKE 1 TABLET AT BEDTIME  90 tablet 3    potassium chloride (KLOR-CON M20) 20 mEq tablet Take 1 tablet (20 mEq total) by mouth 2 (two) times a day 180 tablet 1     No current facility-administered medications for this visit  Allergies   Allergen Reactions    Sulfa Antibiotics Other (See Comments) and Hives     Black spots under skin    Other Allergic Rhinitis      Immunizations:     Immunization History   Administered Date(s) Administered    H1N1, All Formulations 12/15/2009    INFLUENZA 10/01/2014, 09/29/2017    Influenza Split High Dose Preservative Free IM 09/29/2017    Influenza TIV (IM) 10/17/2008, 10/01/2014    Influenza, high dose seasonal 0 5 mL 10/05/2018    Pneumococcal Conjugate 13-Valent 12/18/2014    Pneumococcal Polysaccharide PPV23 06/16/2010    Td (adult), adsorbed 04/05/2007    Tdap 12/03/2015, 01/10/2017    Zoster 09/09/2009      Medicare Screening Tests and Risk Assessments: Chucky Méndez is here for her Subsequent Wellness visit  Health Risk Assessment:  Patient rates overall health as good   Patient feels that their physical health rating is Same  Eyesight was rated as Same  Hearing was rated as Same  Patient feels that their emotional and mental health rating is Same  Pain experienced by patient in the last 7 days has been None  Patient states that she has experienced no weight loss or gain in last 6 months  Emotional/Mental Health:  Patient has not been feeling nervous/anxious  PHQ-9 Depression Screening:    Frequency of the following problems over the past two weeks:      1  Little interest or pleasure in doing things: 0 - not at all      2  Feeling down, depressed, or hopeless: 0 - not at all  PHQ-2 Score: 0          Broken Bones/Falls: Fall Risk Assessment:    In the past year, patient has experienced: No history of falling in past year          Bladder/Bowel:  Patient has not leaked urine accidently in the last six months  Patient reports no loss of bowel control  Immunizations:  Patient has had a flu vaccination within the last year  Patient has received a pneumonia shot  Patient has received a shingles shot  Patient has received tetanus/diphtheria shot  Home Safety:  Patient does not have trouble with stairs inside or outside of their home  Patient currently reports that there are no safety hazards present in home, working smoke alarms, no working carbon monoxide detectors  Preventative Screenings:   Breast cancer screening performed, colon cancer screen completed, cholesterol screen completed, glaucoma eye exam completed,     Nutrition:  Current diet: Regular with servings of the following:    Medications:  Patient is currently taking over-the-counter supplements  Patient is able to manage medications  Lifestyle Choices:  Patient reports no tobacco use  Patient has smoked or used tobacco in the past   Patient has stopped her tobacco use  Patient reports alcohol use  Alcohol use per week: socially  Patient drives a vehicle  Patient wears seat belt          Activities of Daily Living:  Can get out of bed by his or her self, able to dress self, able to make own meals, able to do own shopping, able to bathe self, can do own laundry/housekeeping, can manage own money, pay bills and track expenses    Previous Hospitalizations:  No hospitalization or ED visit in past 12 months        Advanced Directives:  Patient has decided on a power of   Patient has spoken to designated power of   Patient has completed advanced directive          Preventative Screening/Counseling:      Cardiovascular:      General: Screening Current          Diabetes:      General: Screening Current          Colorectal Cancer:      Comments: Due this year and she will call to schedule        Breast Cancer:      General: Screening Current          Cervical Cancer:      General: Screening Not Indicated          Osteoporosis:      Counseling: Calcium and Vitamin D Intake and Regular Weightbearing Exercise      Comments: DXA in 2013 was normal        AAA:      General: Screening Not Indicated          Glaucoma:      General: Screening Current          HIV:      General: Screening Not Indicated          Hepatitis C:      General: Screening Not Indicated        Advanced Directives:   Patient has living will for healthcare,     Immunizations:      Influenza: Influenza UTD This Year      Pneumococcal: Lifetime Vaccine Completed      Shingrix: Risks & Benefits Discussed      Zostavax: Zostavax Vaccine UTD

## 2019-10-13 DIAGNOSIS — I10 HYPERTENSION, ESSENTIAL, BENIGN: ICD-10-CM

## 2019-10-14 RX ORDER — LOSARTAN POTASSIUM AND HYDROCHLOROTHIAZIDE 25; 100 MG/1; MG/1
TABLET ORAL
Qty: 90 TABLET | Refills: 3 | Status: SHIPPED | OUTPATIENT
Start: 2019-10-14 | End: 2019-12-04 | Stop reason: SDUPTHER

## 2019-12-04 DIAGNOSIS — I10 HYPERTENSION, ESSENTIAL, BENIGN: ICD-10-CM

## 2019-12-04 DIAGNOSIS — I10 BENIGN ESSENTIAL HYPERTENSION: Primary | ICD-10-CM

## 2019-12-04 RX ORDER — LOSARTAN POTASSIUM AND HYDROCHLOROTHIAZIDE 25; 100 MG/1; MG/1
1 TABLET ORAL DAILY
Qty: 90 TABLET | Refills: 3 | Status: SHIPPED | OUTPATIENT
Start: 2019-12-04 | End: 2020-01-01 | Stop reason: ALTCHOICE

## 2019-12-05 RX ORDER — LOSARTAN POTASSIUM 100 MG/1
100 TABLET ORAL DAILY
Qty: 90 TABLET | Refills: 0 | Status: SHIPPED | OUTPATIENT
Start: 2019-12-05 | End: 2020-02-24

## 2019-12-05 RX ORDER — HYDROCHLOROTHIAZIDE 25 MG/1
25 TABLET ORAL DAILY
Qty: 90 TABLET | Refills: 0 | Status: SHIPPED | OUTPATIENT
Start: 2019-12-05 | End: 2020-02-24

## 2019-12-23 DIAGNOSIS — I10 HYPERTENSION, ESSENTIAL, BENIGN: ICD-10-CM

## 2019-12-23 RX ORDER — AMLODIPINE BESYLATE 2.5 MG/1
TABLET ORAL
Qty: 90 TABLET | Refills: 2 | Status: SHIPPED | OUTPATIENT
Start: 2019-12-23 | End: 2020-01-01

## 2020-01-01 ENCOUNTER — APPOINTMENT (OUTPATIENT)
Dept: LAB | Age: 75
End: 2020-01-01
Payer: COMMERCIAL

## 2020-01-01 ENCOUNTER — OFFICE VISIT (OUTPATIENT)
Dept: INTERNAL MEDICINE CLINIC | Facility: CLINIC | Age: 75
End: 2020-01-01
Payer: COMMERCIAL

## 2020-01-01 VITALS
DIASTOLIC BLOOD PRESSURE: 74 MMHG | TEMPERATURE: 98.2 F | HEIGHT: 64 IN | BODY MASS INDEX: 26.84 KG/M2 | SYSTOLIC BLOOD PRESSURE: 126 MMHG | OXYGEN SATURATION: 96 % | HEART RATE: 78 BPM | WEIGHT: 157.2 LBS

## 2020-01-01 DIAGNOSIS — E04.2 NON-TOXIC MULTINODULAR GOITER: ICD-10-CM

## 2020-01-01 DIAGNOSIS — I10 HYPERTENSION, ESSENTIAL, BENIGN: ICD-10-CM

## 2020-01-01 DIAGNOSIS — I10 BENIGN ESSENTIAL HYPERTENSION: ICD-10-CM

## 2020-01-01 DIAGNOSIS — J45.30 MILD PERSISTENT ASTHMA WITHOUT COMPLICATION: ICD-10-CM

## 2020-01-01 DIAGNOSIS — R73.01 IMPAIRED FASTING BLOOD SUGAR: ICD-10-CM

## 2020-01-01 DIAGNOSIS — E78.2 MIXED HYPERLIPIDEMIA: ICD-10-CM

## 2020-01-01 DIAGNOSIS — I10 BENIGN ESSENTIAL HYPERTENSION: Primary | ICD-10-CM

## 2020-01-01 DIAGNOSIS — E87.6 HYPOKALEMIA: ICD-10-CM

## 2020-01-01 DIAGNOSIS — Z00.00 MEDICARE ANNUAL WELLNESS VISIT, SUBSEQUENT: ICD-10-CM

## 2020-01-01 LAB
ALBUMIN SERPL BCP-MCNC: 3.4 G/DL (ref 3.5–5)
ALP SERPL-CCNC: 66 U/L (ref 46–116)
ALT SERPL W P-5'-P-CCNC: 23 U/L (ref 12–78)
ANION GAP SERPL CALCULATED.3IONS-SCNC: 9 MMOL/L (ref 4–13)
AST SERPL W P-5'-P-CCNC: 13 U/L (ref 5–45)
BILIRUB SERPL-MCNC: 0.61 MG/DL (ref 0.2–1)
BUN SERPL-MCNC: 15 MG/DL (ref 5–25)
CALCIUM SERPL-MCNC: 9.2 MG/DL (ref 8.3–10.1)
CHLORIDE SERPL-SCNC: 108 MMOL/L (ref 100–108)
CHOLEST SERPL-MCNC: 149 MG/DL (ref 50–200)
CO2 SERPL-SCNC: 24 MMOL/L (ref 21–32)
CREAT SERPL-MCNC: 1.08 MG/DL (ref 0.6–1.3)
ERYTHROCYTE [DISTWIDTH] IN BLOOD BY AUTOMATED COUNT: 13.8 % (ref 11.6–15.1)
GFR SERPL CREATININE-BSD FRML MDRD: 50 ML/MIN/1.73SQ M
GLUCOSE P FAST SERPL-MCNC: 108 MG/DL (ref 65–99)
HCT VFR BLD AUTO: 45.8 % (ref 34.8–46.1)
HDLC SERPL-MCNC: 67 MG/DL
HGB BLD-MCNC: 14.2 G/DL (ref 11.5–15.4)
LDLC SERPL CALC-MCNC: 72 MG/DL (ref 0–100)
MCH RBC QN AUTO: 25.7 PG (ref 26.8–34.3)
MCHC RBC AUTO-ENTMCNC: 31 G/DL (ref 31.4–37.4)
MCV RBC AUTO: 83 FL (ref 82–98)
NONHDLC SERPL-MCNC: 82 MG/DL
PLATELET # BLD AUTO: 236 THOUSANDS/UL (ref 149–390)
PMV BLD AUTO: 11.9 FL (ref 8.9–12.7)
POTASSIUM SERPL-SCNC: 3.6 MMOL/L (ref 3.5–5.3)
PROT SERPL-MCNC: 7.2 G/DL (ref 6.4–8.2)
RBC # BLD AUTO: 5.53 MILLION/UL (ref 3.81–5.12)
SODIUM SERPL-SCNC: 141 MMOL/L (ref 136–145)
TRIGL SERPL-MCNC: 51 MG/DL
WBC # BLD AUTO: 6.36 THOUSAND/UL (ref 4.31–10.16)

## 2020-01-01 PROCEDURE — 80053 COMPREHEN METABOLIC PANEL: CPT

## 2020-01-01 PROCEDURE — 36415 COLL VENOUS BLD VENIPUNCTURE: CPT

## 2020-01-01 PROCEDURE — 99214 OFFICE O/P EST MOD 30 MIN: CPT | Performed by: INTERNAL MEDICINE

## 2020-01-01 PROCEDURE — 1125F AMNT PAIN NOTED PAIN PRSNT: CPT | Performed by: INTERNAL MEDICINE

## 2020-01-01 PROCEDURE — 3725F SCREEN DEPRESSION PERFORMED: CPT | Performed by: INTERNAL MEDICINE

## 2020-01-01 PROCEDURE — 1160F RVW MEDS BY RX/DR IN RCRD: CPT | Performed by: INTERNAL MEDICINE

## 2020-01-01 PROCEDURE — 3074F SYST BP LT 130 MM HG: CPT | Performed by: INTERNAL MEDICINE

## 2020-01-01 PROCEDURE — 1170F FXNL STATUS ASSESSED: CPT | Performed by: INTERNAL MEDICINE

## 2020-01-01 PROCEDURE — 80061 LIPID PANEL: CPT

## 2020-01-01 PROCEDURE — 3078F DIAST BP <80 MM HG: CPT | Performed by: INTERNAL MEDICINE

## 2020-01-01 PROCEDURE — 1036F TOBACCO NON-USER: CPT | Performed by: INTERNAL MEDICINE

## 2020-01-01 PROCEDURE — 85027 COMPLETE CBC AUTOMATED: CPT

## 2020-01-01 PROCEDURE — G0439 PPPS, SUBSEQ VISIT: HCPCS | Performed by: INTERNAL MEDICINE

## 2020-01-01 RX ORDER — AMLODIPINE BESYLATE 2.5 MG/1
TABLET ORAL
Qty: 90 TABLET | Refills: 2 | Status: SHIPPED | OUTPATIENT
Start: 2020-01-01 | End: 2021-01-01

## 2020-01-01 RX ORDER — POTASSIUM CHLORIDE 1500 MG/1
TABLET, EXTENDED RELEASE ORAL
Qty: 180 TABLET | Refills: 1 | Status: SHIPPED | OUTPATIENT
Start: 2020-01-01 | End: 2021-01-01

## 2020-01-01 RX ORDER — ATORVASTATIN CALCIUM 40 MG/1
TABLET, FILM COATED ORAL
Qty: 90 TABLET | Refills: 3 | Status: SHIPPED | OUTPATIENT
Start: 2020-01-01 | End: 2021-01-01 | Stop reason: HOSPADM

## 2020-01-01 RX ORDER — AMLODIPINE BESYLATE 5 MG/1
TABLET ORAL
Qty: 90 TABLET | Refills: 1 | Status: SHIPPED | OUTPATIENT
Start: 2020-01-01 | End: 2021-01-01

## 2020-01-01 RX ORDER — LOSARTAN POTASSIUM 100 MG/1
TABLET ORAL
Qty: 90 TABLET | Refills: 1 | Status: SHIPPED | OUTPATIENT
Start: 2020-01-01 | End: 2021-01-01

## 2020-01-01 RX ORDER — MONTELUKAST SODIUM 10 MG/1
TABLET ORAL
Qty: 90 TABLET | Refills: 3 | Status: SHIPPED | OUTPATIENT
Start: 2020-01-01 | End: 2021-01-01

## 2020-01-01 RX ORDER — HYDROCHLOROTHIAZIDE 25 MG/1
TABLET ORAL
Qty: 90 TABLET | Refills: 1 | Status: SHIPPED | OUTPATIENT
Start: 2020-01-01 | End: 2021-01-01

## 2020-01-12 DIAGNOSIS — E87.6 HYPOKALEMIA: ICD-10-CM

## 2020-01-12 DIAGNOSIS — E78.2 MIXED HYPERLIPIDEMIA: ICD-10-CM

## 2020-01-13 RX ORDER — POTASSIUM CHLORIDE 1500 MG/1
TABLET, EXTENDED RELEASE ORAL
Qty: 180 TABLET | Refills: 1 | Status: SHIPPED | OUTPATIENT
Start: 2020-01-13 | End: 2020-06-23

## 2020-01-13 RX ORDER — ATORVASTATIN CALCIUM 40 MG/1
TABLET, FILM COATED ORAL
Qty: 90 TABLET | Refills: 1 | Status: SHIPPED | OUTPATIENT
Start: 2020-01-13 | End: 2020-06-23

## 2020-01-31 DIAGNOSIS — I10 HYPERTENSION, ESSENTIAL, BENIGN: ICD-10-CM

## 2020-01-31 RX ORDER — AMLODIPINE BESYLATE 5 MG/1
TABLET ORAL
Qty: 90 TABLET | Refills: 1 | Status: SHIPPED | OUTPATIENT
Start: 2020-01-31 | End: 2020-01-01

## 2020-02-03 ENCOUNTER — APPOINTMENT (OUTPATIENT)
Dept: LAB | Age: 75
End: 2020-02-03
Payer: COMMERCIAL

## 2020-02-03 DIAGNOSIS — E04.2 NON-TOXIC MULTINODULAR GOITER: ICD-10-CM

## 2020-02-03 DIAGNOSIS — E78.2 MIXED HYPERLIPIDEMIA: ICD-10-CM

## 2020-02-03 DIAGNOSIS — I10 BENIGN ESSENTIAL HYPERTENSION: ICD-10-CM

## 2020-02-03 LAB
ALBUMIN SERPL BCP-MCNC: 3.4 G/DL (ref 3.5–5)
ALP SERPL-CCNC: 66 U/L (ref 46–116)
ALT SERPL W P-5'-P-CCNC: 33 U/L (ref 12–78)
ANION GAP SERPL CALCULATED.3IONS-SCNC: 4 MMOL/L (ref 4–13)
AST SERPL W P-5'-P-CCNC: 17 U/L (ref 5–45)
BASOPHILS # BLD AUTO: 0.04 THOUSANDS/ΜL (ref 0–0.1)
BASOPHILS NFR BLD AUTO: 1 % (ref 0–1)
BILIRUB SERPL-MCNC: 0.58 MG/DL (ref 0.2–1)
BUN SERPL-MCNC: 18 MG/DL (ref 5–25)
CALCIUM SERPL-MCNC: 9.3 MG/DL (ref 8.3–10.1)
CHLORIDE SERPL-SCNC: 109 MMOL/L (ref 100–108)
CHOLEST SERPL-MCNC: 150 MG/DL (ref 50–200)
CO2 SERPL-SCNC: 28 MMOL/L (ref 21–32)
CREAT SERPL-MCNC: 1.02 MG/DL (ref 0.6–1.3)
EOSINOPHIL # BLD AUTO: 0.32 THOUSAND/ΜL (ref 0–0.61)
EOSINOPHIL NFR BLD AUTO: 5 % (ref 0–6)
ERYTHROCYTE [DISTWIDTH] IN BLOOD BY AUTOMATED COUNT: 14.3 % (ref 11.6–15.1)
GFR SERPL CREATININE-BSD FRML MDRD: 54 ML/MIN/1.73SQ M
GLUCOSE P FAST SERPL-MCNC: 97 MG/DL (ref 65–99)
HCT VFR BLD AUTO: 43.2 % (ref 34.8–46.1)
HDLC SERPL-MCNC: 69 MG/DL
HGB BLD-MCNC: 13.6 G/DL (ref 11.5–15.4)
IMM GRANULOCYTES # BLD AUTO: 0.01 THOUSAND/UL (ref 0–0.2)
IMM GRANULOCYTES NFR BLD AUTO: 0 % (ref 0–2)
LDLC SERPL CALC-MCNC: 69 MG/DL (ref 0–100)
LYMPHOCYTES # BLD AUTO: 2.27 THOUSANDS/ΜL (ref 0.6–4.47)
LYMPHOCYTES NFR BLD AUTO: 38 % (ref 14–44)
MCH RBC QN AUTO: 26 PG (ref 26.8–34.3)
MCHC RBC AUTO-ENTMCNC: 31.5 G/DL (ref 31.4–37.4)
MCV RBC AUTO: 83 FL (ref 82–98)
MONOCYTES # BLD AUTO: 0.6 THOUSAND/ΜL (ref 0.17–1.22)
MONOCYTES NFR BLD AUTO: 10 % (ref 4–12)
NEUTROPHILS # BLD AUTO: 2.68 THOUSANDS/ΜL (ref 1.85–7.62)
NEUTS SEG NFR BLD AUTO: 46 % (ref 43–75)
NONHDLC SERPL-MCNC: 81 MG/DL
NRBC BLD AUTO-RTO: 0 /100 WBCS
PLATELET # BLD AUTO: 199 THOUSANDS/UL (ref 149–390)
PMV BLD AUTO: 12.1 FL (ref 8.9–12.7)
POTASSIUM SERPL-SCNC: 3.6 MMOL/L (ref 3.5–5.3)
PROT SERPL-MCNC: 6.9 G/DL (ref 6.4–8.2)
RBC # BLD AUTO: 5.23 MILLION/UL (ref 3.81–5.12)
SODIUM SERPL-SCNC: 141 MMOL/L (ref 136–145)
TRIGL SERPL-MCNC: 59 MG/DL
TSH SERPL DL<=0.05 MIU/L-ACNC: 1.15 UIU/ML (ref 0.36–3.74)
WBC # BLD AUTO: 5.92 THOUSAND/UL (ref 4.31–10.16)

## 2020-02-03 PROCEDURE — 85025 COMPLETE CBC W/AUTO DIFF WBC: CPT

## 2020-02-03 PROCEDURE — 84443 ASSAY THYROID STIM HORMONE: CPT

## 2020-02-03 PROCEDURE — 36415 COLL VENOUS BLD VENIPUNCTURE: CPT

## 2020-02-03 PROCEDURE — 80061 LIPID PANEL: CPT

## 2020-02-03 PROCEDURE — 80053 COMPREHEN METABOLIC PANEL: CPT

## 2020-02-13 ENCOUNTER — OFFICE VISIT (OUTPATIENT)
Dept: INTERNAL MEDICINE CLINIC | Facility: CLINIC | Age: 75
End: 2020-02-13
Payer: COMMERCIAL

## 2020-02-13 VITALS
SYSTOLIC BLOOD PRESSURE: 138 MMHG | DIASTOLIC BLOOD PRESSURE: 80 MMHG | HEART RATE: 75 BPM | BODY MASS INDEX: 26.73 KG/M2 | WEIGHT: 156.6 LBS | HEIGHT: 64 IN | OXYGEN SATURATION: 97 %

## 2020-02-13 DIAGNOSIS — E78.2 MIXED HYPERLIPIDEMIA: ICD-10-CM

## 2020-02-13 DIAGNOSIS — I10 BENIGN ESSENTIAL HYPERTENSION: Primary | ICD-10-CM

## 2020-02-13 DIAGNOSIS — J45.30 MILD PERSISTENT ASTHMA WITHOUT COMPLICATION: ICD-10-CM

## 2020-02-13 DIAGNOSIS — E04.2 NON-TOXIC MULTINODULAR GOITER: ICD-10-CM

## 2020-02-13 PROCEDURE — 99214 OFFICE O/P EST MOD 30 MIN: CPT | Performed by: INTERNAL MEDICINE

## 2020-02-13 PROCEDURE — 4040F PNEUMOC VAC/ADMIN/RCVD: CPT | Performed by: INTERNAL MEDICINE

## 2020-02-13 PROCEDURE — 3079F DIAST BP 80-89 MM HG: CPT | Performed by: INTERNAL MEDICINE

## 2020-02-13 PROCEDURE — 1160F RVW MEDS BY RX/DR IN RCRD: CPT | Performed by: INTERNAL MEDICINE

## 2020-02-13 PROCEDURE — 1036F TOBACCO NON-USER: CPT | Performed by: INTERNAL MEDICINE

## 2020-02-13 PROCEDURE — 3075F SYST BP GE 130 - 139MM HG: CPT | Performed by: INTERNAL MEDICINE

## 2020-02-13 PROCEDURE — 3008F BODY MASS INDEX DOCD: CPT | Performed by: INTERNAL MEDICINE

## 2020-02-13 NOTE — PROGRESS NOTES
Assessment/Plan:    Non-toxic multinodular goiter  US in     Mild persistent asthma without complication  Stable, controlled    Benign essential hypertension  Controlled on losartan HCTZ amlodipine    Hyperlipidemia  Controlled on atorvastatin    BMI Counseling: Body mass index is 26 88 kg/m²  The BMI is above normal  No BMI follow-up plan is appropriate  Patient is 71 years old and weight reduction/weight gain would further complicate their underlying illness  Weight is acceptable     Problem List Items Addressed This Visit        Endocrine    Non-toxic multinodular goiter     US in             Respiratory    Mild persistent asthma without complication     Stable, controlled            Cardiovascular and Mediastinum    Benign essential hypertension - Primary     Controlled on losartan HCTZ amlodipine         Relevant Orders    CBC    Comprehensive metabolic panel       Other    Hyperlipidemia     Controlled on atorvastatin         Relevant Orders    Lipid panel    Comprehensive metabolic panel            Subjective:      Patient ID: Dahlia Brothers is a 75 y o  female  HPI  Here for a f/u  Feeling well overall  Sister in law just   Daughter just had knee surgery  In general, she is feeling well  Recent labs- normal TSH, lipids, CBC CMP    The following portions of the patient's history were reviewed and updated as appropriate: allergies, current medications, past family history, past medical history, past surgical history and problem list     Review of Systems   Constitutional: Negative for fatigue, fever and unexpected weight change  HENT: Negative for congestion, sinus pressure, sinus pain and sore throat  Respiratory: Negative for cough, shortness of breath and wheezing  Cardiovascular: Negative for chest pain, palpitations and leg swelling  Gastrointestinal: Negative for abdominal pain, constipation, diarrhea, nausea and vomiting  Genitourinary: Positive for frequency   Negative for difficulty urinating and dysuria  Musculoskeletal: Negative for arthralgias and myalgias  Neurological: Negative for dizziness and headaches  Objective:      /80   Pulse 75   Ht 5' 4" (1 626 m)   Wt 71 kg (156 lb 9 6 oz)   LMP  (LMP Unknown)   SpO2 97%   BMI 26 88 kg/m²          Physical Exam   Constitutional: She is oriented to person, place, and time  She appears well-developed and well-nourished  HENT:   Head: Normocephalic and atraumatic  Right Ear: External ear normal    Left Ear: External ear normal    Mouth/Throat: Oropharynx is clear and moist    Eyes: Conjunctivae are normal    Neck: Neck supple  Cardiovascular: Normal rate, regular rhythm and normal heart sounds  No murmur heard  Pulmonary/Chest: Effort normal and breath sounds normal  No respiratory distress  She has no wheezes  She has no rales  Abdominal: Soft  She exhibits no distension and no mass  There is no tenderness  There is no rebound and no guarding  Neurological: She is alert and oriented to person, place, and time  Skin: Skin is warm and dry  Psychiatric: She has a normal mood and affect   Her behavior is normal  Judgment and thought content normal

## 2020-02-24 DIAGNOSIS — I10 BENIGN ESSENTIAL HYPERTENSION: ICD-10-CM

## 2020-02-24 RX ORDER — LOSARTAN POTASSIUM 100 MG/1
TABLET ORAL
Qty: 90 TABLET | Refills: 1 | Status: SHIPPED | OUTPATIENT
Start: 2020-02-24 | End: 2020-01-01

## 2020-02-24 RX ORDER — HYDROCHLOROTHIAZIDE 25 MG/1
TABLET ORAL
Qty: 90 TABLET | Refills: 1 | Status: SHIPPED | OUTPATIENT
Start: 2020-02-24 | End: 2020-01-01

## 2020-04-13 DIAGNOSIS — J30.9 ALLERGIC RHINITIS, UNSPECIFIED SEASONALITY, UNSPECIFIED TRIGGER: ICD-10-CM

## 2020-04-14 RX ORDER — FLUTICASONE PROPIONATE 50 MCG
SPRAY, SUSPENSION (ML) NASAL
Qty: 48 ML | Refills: 1 | Status: SHIPPED | OUTPATIENT
Start: 2020-04-14 | End: 2021-01-01

## 2020-04-27 DIAGNOSIS — J45.30 MILD PERSISTENT ASTHMA WITHOUT COMPLICATION: ICD-10-CM

## 2020-05-14 ENCOUNTER — OFFICE VISIT (OUTPATIENT)
Dept: INTERNAL MEDICINE CLINIC | Facility: CLINIC | Age: 75
End: 2020-05-14
Payer: COMMERCIAL

## 2020-05-14 VITALS
RESPIRATION RATE: 16 BRPM | TEMPERATURE: 98.8 F | OXYGEN SATURATION: 95 % | HEART RATE: 92 BPM | SYSTOLIC BLOOD PRESSURE: 140 MMHG | WEIGHT: 158.4 LBS | DIASTOLIC BLOOD PRESSURE: 80 MMHG | BODY MASS INDEX: 27.04 KG/M2 | HEIGHT: 64 IN

## 2020-05-14 DIAGNOSIS — R30.0 DYSURIA: Primary | ICD-10-CM

## 2020-05-14 LAB
BACTERIA UR QL AUTO: ABNORMAL /HPF
BILIRUB UR QL STRIP: NEGATIVE
CLARITY UR: CLEAR
COLOR UR: YELLOW
GLUCOSE UR STRIP-MCNC: NEGATIVE MG/DL
HGB UR QL STRIP.AUTO: NEGATIVE
HYALINE CASTS #/AREA URNS LPF: ABNORMAL /LPF
KETONES UR STRIP-MCNC: NEGATIVE MG/DL
LEUKOCYTE ESTERASE UR QL STRIP: ABNORMAL
NITRITE UR QL STRIP: NEGATIVE
NON-SQ EPI CELLS URNS QL MICRO: ABNORMAL /HPF
PH UR STRIP.AUTO: 6 [PH]
PROT UR STRIP-MCNC: NEGATIVE MG/DL
RBC #/AREA URNS AUTO: ABNORMAL /HPF
SL AMB  POCT GLUCOSE, UA: ABNORMAL
SL AMB LEUKOCYTE ESTERASE,UA: 70
SL AMB POCT BILIRUBIN,UA: ABNORMAL
SL AMB POCT BLOOD,UA: ABNORMAL
SL AMB POCT CLARITY,UA: CLEAR
SL AMB POCT COLOR,UA: YELLOW
SL AMB POCT KETONES,UA: ABNORMAL
SL AMB POCT NITRITE,UA: ABNORMAL
SL AMB POCT PH,UA: 6
SL AMB POCT SPECIFIC GRAVITY,UA: 1.01
SL AMB POCT URINE PROTEIN: ABNORMAL
SL AMB POCT UROBILINOGEN: 3.5
SP GR UR STRIP.AUTO: 1.01 (ref 1–1.03)
UROBILINOGEN UR QL STRIP.AUTO: 0.2 E.U./DL
WBC #/AREA URNS AUTO: ABNORMAL /HPF

## 2020-05-14 PROCEDURE — 81001 URINALYSIS AUTO W/SCOPE: CPT | Performed by: NURSE PRACTITIONER

## 2020-05-14 PROCEDURE — 4040F PNEUMOC VAC/ADMIN/RCVD: CPT | Performed by: NURSE PRACTITIONER

## 2020-05-14 PROCEDURE — 1036F TOBACCO NON-USER: CPT | Performed by: NURSE PRACTITIONER

## 2020-05-14 PROCEDURE — 87086 URINE CULTURE/COLONY COUNT: CPT | Performed by: NURSE PRACTITIONER

## 2020-05-14 PROCEDURE — 3079F DIAST BP 80-89 MM HG: CPT | Performed by: NURSE PRACTITIONER

## 2020-05-14 PROCEDURE — 87186 SC STD MICRODIL/AGAR DIL: CPT | Performed by: NURSE PRACTITIONER

## 2020-05-14 PROCEDURE — 3008F BODY MASS INDEX DOCD: CPT | Performed by: NURSE PRACTITIONER

## 2020-05-14 PROCEDURE — 81002 URINALYSIS NONAUTO W/O SCOPE: CPT | Performed by: NURSE PRACTITIONER

## 2020-05-14 PROCEDURE — 99213 OFFICE O/P EST LOW 20 MIN: CPT | Performed by: NURSE PRACTITIONER

## 2020-05-14 PROCEDURE — 87077 CULTURE AEROBIC IDENTIFY: CPT | Performed by: NURSE PRACTITIONER

## 2020-05-14 PROCEDURE — 1160F RVW MEDS BY RX/DR IN RCRD: CPT | Performed by: NURSE PRACTITIONER

## 2020-05-14 PROCEDURE — 3077F SYST BP >= 140 MM HG: CPT | Performed by: NURSE PRACTITIONER

## 2020-05-14 RX ORDER — NITROFURANTOIN 25; 75 MG/1; MG/1
100 CAPSULE ORAL 2 TIMES DAILY
Qty: 14 CAPSULE | Refills: 0 | Status: SHIPPED | OUTPATIENT
Start: 2020-05-14 | End: 2020-05-21

## 2020-05-16 LAB — BACTERIA UR CULT: ABNORMAL

## 2020-06-01 ENCOUNTER — HOSPITAL ENCOUNTER (OUTPATIENT)
Dept: RADIOLOGY | Age: 75
Discharge: HOME | End: 2020-06-01
Payer: COMMERCIAL

## 2020-06-01 DIAGNOSIS — E04.2 NON-TOXIC MULTINODULAR GOITER: ICD-10-CM

## 2020-06-01 PROCEDURE — 76536 US EXAM OF HEAD AND NECK: CPT

## 2020-06-06 ENCOUNTER — HOSPITAL ENCOUNTER (OUTPATIENT)
Dept: RADIOLOGY | Age: 75
Discharge: HOME | End: 2020-06-06
Payer: COMMERCIAL

## 2020-06-06 VITALS — BODY MASS INDEX: 26.63 KG/M2 | HEIGHT: 64 IN | WEIGHT: 156 LBS

## 2020-06-06 DIAGNOSIS — Z12.31 ENCOUNTER FOR SCREENING MAMMOGRAM FOR BREAST CANCER: ICD-10-CM

## 2020-06-06 PROCEDURE — 77063 BREAST TOMOSYNTHESIS BI: CPT

## 2020-06-06 PROCEDURE — 77067 SCR MAMMO BI INCL CAD: CPT

## 2020-06-09 ENCOUNTER — TELEPHONE (OUTPATIENT)
Dept: SURGICAL ONCOLOGY | Facility: CLINIC | Age: 75
End: 2020-06-09

## 2020-06-10 ENCOUNTER — OFFICE VISIT (OUTPATIENT)
Dept: SURGICAL ONCOLOGY | Facility: CLINIC | Age: 75
End: 2020-06-10
Payer: COMMERCIAL

## 2020-06-10 VITALS
HEART RATE: 83 BPM | WEIGHT: 157 LBS | TEMPERATURE: 98.1 F | RESPIRATION RATE: 16 BRPM | DIASTOLIC BLOOD PRESSURE: 80 MMHG | BODY MASS INDEX: 26.8 KG/M2 | SYSTOLIC BLOOD PRESSURE: 140 MMHG | HEIGHT: 64 IN

## 2020-06-10 DIAGNOSIS — E04.2 NON-TOXIC MULTINODULAR GOITER: Primary | ICD-10-CM

## 2020-06-10 PROCEDURE — 1036F TOBACCO NON-USER: CPT | Performed by: SURGERY

## 2020-06-10 PROCEDURE — 4040F PNEUMOC VAC/ADMIN/RCVD: CPT | Performed by: SURGERY

## 2020-06-10 PROCEDURE — 1160F RVW MEDS BY RX/DR IN RCRD: CPT | Performed by: SURGERY

## 2020-06-10 PROCEDURE — 3079F DIAST BP 80-89 MM HG: CPT | Performed by: SURGERY

## 2020-06-10 PROCEDURE — 99213 OFFICE O/P EST LOW 20 MIN: CPT | Performed by: SURGERY

## 2020-06-10 PROCEDURE — 3008F BODY MASS INDEX DOCD: CPT | Performed by: SURGERY

## 2020-06-10 PROCEDURE — 3077F SYST BP >= 140 MM HG: CPT | Performed by: SURGERY

## 2020-06-22 DIAGNOSIS — E87.6 HYPOKALEMIA: ICD-10-CM

## 2020-06-22 DIAGNOSIS — E78.2 MIXED HYPERLIPIDEMIA: ICD-10-CM

## 2020-06-23 RX ORDER — ATORVASTATIN CALCIUM 40 MG/1
TABLET, FILM COATED ORAL
Qty: 90 TABLET | Refills: 1 | Status: SHIPPED | OUTPATIENT
Start: 2020-06-23 | End: 2020-01-01

## 2020-06-23 RX ORDER — POTASSIUM CHLORIDE 1500 MG/1
TABLET, EXTENDED RELEASE ORAL
Qty: 180 TABLET | Refills: 1 | Status: SHIPPED | OUTPATIENT
Start: 2020-06-23 | End: 2020-01-01

## 2020-07-13 ENCOUNTER — TELEPHONE (OUTPATIENT)
Dept: INTERNAL MEDICINE CLINIC | Facility: CLINIC | Age: 75
End: 2020-07-13

## 2020-07-13 NOTE — TELEPHONE ENCOUNTER
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

## 2020-09-04 PROBLEM — R30.0 DYSURIA: Status: RESOLVED | Noted: 2020-05-14 | Resolved: 2020-01-01

## 2020-09-04 NOTE — PROGRESS NOTES
Assessment/Plan:    Non-toxic multinodular goiter  Stable since 2017, no f/u necessary at this point    Mild persistent asthma without complication  Stable on current inhalers    Benign essential hypertension  Well controlled on amlodipine losartan and HCTZ    Hyperlipidemia  Well controlled on atorvastatin         Problem List Items Addressed This Visit        Endocrine    Non-toxic multinodular goiter     Stable since 2017, no f/u necessary at this point            Respiratory    Mild persistent asthma without complication     Stable on current inhalers            Cardiovascular and Mediastinum    Benign essential hypertension - Primary     Well controlled on amlodipine losartan and HCTZ         Relevant Orders    Basic metabolic panel    CBC and Platelet       Other    Hyperlipidemia     Well controlled on atorvastatin         Relevant Orders    Lipid Panel with Direct LDL reflex      Other Visit Diagnoses     Medicare annual wellness visit, subsequent        Impaired fasting blood sugar        Relevant Orders    HEMOGLOBIN A1C W/ EAG ESTIMATION            Subjective:      Patient ID: Shakeel Anaya is a 67 y o  female  HPI  Feeling well, no issues  Recent labs   FBS slightly elevated, lipids normal  Seasonal allergies starting    The following portions of the patient's history were reviewed and updated as appropriate: allergies, current medications, past family history, past medical history, past social history, past surgical history and problem list     Review of Systems   Constitutional: Negative for fatigue, fever and unexpected weight change  HENT: Positive for postnasal drip  Negative for congestion, sinus pressure and sore throat  Respiratory: Negative for cough, shortness of breath and wheezing  Cardiovascular: Negative for chest pain, palpitations and leg swelling  Gastrointestinal: Negative for abdominal pain, constipation, diarrhea, nausea and vomiting     Genitourinary: Negative for difficulty urinating  Musculoskeletal: Negative for arthralgias and myalgias  Neurological: Negative for dizziness and headaches  Psychiatric/Behavioral: Negative for dysphoric mood and sleep disturbance  The patient is not nervous/anxious  Objective:      /74   Pulse 78   Temp 98 2 °F (36 8 °C)   Ht 5' 4" (1 626 m)   Wt 71 3 kg (157 lb 3 2 oz)   LMP  (LMP Unknown)   SpO2 96%   BMI 26 98 kg/m²          Physical Exam  Constitutional:       Appearance: She is well-developed  HENT:      Head: Normocephalic and atraumatic  Right Ear: External ear normal  There is no impacted cerumen  Left Ear: External ear normal  There is no impacted cerumen  Eyes:      Conjunctiva/sclera: Conjunctivae normal    Neck:      Musculoskeletal: Neck supple  Cardiovascular:      Rate and Rhythm: Normal rate and regular rhythm  Heart sounds: Normal heart sounds  No murmur  Pulmonary:      Effort: Pulmonary effort is normal  No respiratory distress  Breath sounds: Normal breath sounds  No wheezing or rales  Abdominal:      General: There is no distension  Palpations: Abdomen is soft  There is no mass  Tenderness: There is no abdominal tenderness  There is no guarding or rebound  Musculoskeletal:      Right lower leg: No edema  Skin:     General: Skin is warm and dry  Neurological:      Mental Status: She is alert and oriented to person, place, and time  Psychiatric:         Behavior: Behavior normal          Thought Content:  Thought content normal          Judgment: Judgment normal

## 2020-09-04 NOTE — PROGRESS NOTES
Assessment and Plan:     Problem List Items Addressed This Visit        Endocrine    Non-toxic multinodular goiter     Stable since 2017, no f/u necessary at this point            Respiratory    Mild persistent asthma without complication     Stable on current inhalers            Cardiovascular and Mediastinum    Benign essential hypertension - Primary     Well controlled on amlodipine losartan and HCTZ         Relevant Orders    Basic metabolic panel    CBC and Platelet       Other    Hyperlipidemia     Well controlled on atorvastatin         Relevant Orders    Lipid Panel with Direct LDL reflex      Other Visit Diagnoses     Medicare annual wellness visit, subsequent        Impaired fasting blood sugar        Relevant Orders    HEMOGLOBIN A1C W/ EAG ESTIMATION           Preventive health issues were discussed with patient, and age appropriate screening tests were ordered as noted in patient's After Visit Summary  Personalized health advice and appropriate referrals for health education or preventive services given if needed, as noted in patient's After Visit Summary  History of Present Illness:     Patient presents for Welcome to Medicare visit       Patient Care Team:  Daniel Cavazos MD as PCP - MD Al Enriquez MD (Gastroenterology)     Review of Systems:     Review of Systems   Problem List:     Patient Active Problem List   Diagnosis    Benign essential hypertension    Hyperlipidemia    Hypokalemia    Mild persistent asthma without complication    Non-toxic multinodular goiter      Past Medical and Surgical History:     Past Medical History:   Diagnosis Date    Allergic rhinitis     last assessed: 12/12/2012    Dysuria 5/14/2020    Hypokalemia     last assessed: 12/14/2012    Urine frequency 5/25/2016     Past Surgical History:   Procedure Laterality Date    COLPOSCOPY      DIAGNOSTIC LAPAROSCOPY  1987      Family History:     Family History   Problem Relation Age of Onset    Lung cancer Mother 80    Asthma Family     Coronary artery disease Family     Thyroid disease Family     No Known Problems Father     No Known Problems Daughter     No Known Problems Maternal Grandmother     Cancer Maternal Grandfather         unknown type/age    No Known Problems Paternal Grandmother     No Known Problems Paternal Grandfather     No Known Problems Son     No Known Problems Maternal Aunt     No Known Problems Paternal Aunt       Social History:        Social History     Socioeconomic History    Marital status: /Civil Union     Spouse name: None    Number of children: None    Years of education: None    Highest education level: None   Occupational History    None   Social Needs    Financial resource strain: None    Food insecurity     Worry: None     Inability: None    Transportation needs     Medical: None     Non-medical: None   Tobacco Use    Smoking status: Former Smoker     Last attempt to quit:      Years since quittin 6    Smokeless tobacco: Never Used   Substance and Sexual Activity    Alcohol use: Yes     Comment: social    Drug use: No    Sexual activity: None   Lifestyle    Physical activity     Days per week: None     Minutes per session: None    Stress: None   Relationships    Social connections     Talks on phone: None     Gets together: None     Attends Temple service: None     Active member of club or organization: None     Attends meetings of clubs or organizations: None     Relationship status: None    Intimate partner violence     Fear of current or ex partner: None     Emotionally abused: None     Physically abused: None     Forced sexual activity: None   Other Topics Concern    None   Social History Narrative    None      Medications and Allergies:     Current Outpatient Medications   Medication Sig Dispense Refill    amLODIPine (NORVASC) 2 5 mg tablet TAKE 1 TABLET BY MOUTH EVERY DAY 90 tablet 2    amLODIPine (NORVASC) 5 mg tablet TAKE 1 TABLET DAILY ALONG WITH A 2 5 MG TABLET 90 tablet 1    atorvastatin (LIPITOR) 40 mg tablet TAKE 1 TABLET BY MOUTH EVERY DAY 90 tablet 1    Calcium Carbonate-Vitamin D (CALCIUM 600+D) 600-200 MG-UNIT TABS Take 1 tablet by mouth daily      fluticasone (FLONASE) 50 mcg/act nasal spray SPRAY 1 SPRAY INTO EACH NOSTRIL EVERY DAY 48 mL 1    hydrochlorothiazide (HYDRODIURIL) 25 mg tablet TAKE 1 TABLET BY MOUTH EVERY DAY 90 tablet 1    KLOR-CON M20 20 MEQ tablet TAKE 1 TABLET (20 MEQ TOTAL) BY MOUTH 2 (TWO) TIMES A  tablet 1    loratadine (CLARITIN) 10 mg tablet Take 1 tablet by mouth daily as needed      losartan (COZAAR) 100 MG tablet TAKE 1 TABLET BY MOUTH EVERY DAY 90 tablet 1    montelukast (SINGULAIR) 10 mg tablet TAKE 1 TABLET AT BEDTIME  90 tablet 3    WIXELA INHUB 100-50 MCG/DOSE inhaler TAKE 1 PUFF BY MOUTH EVERY  each 1     No current facility-administered medications for this visit  Allergies   Allergen Reactions    Sulfa Antibiotics Other (See Comments) and Hives     Black spots under skin    Amoxicillin GI Intolerance    Other Allergic Rhinitis      Immunizations:     Immunization History   Administered Date(s) Administered    H1N1, All Formulations 12/15/2009    INFLUENZA 10/01/2014, 09/29/2017    Influenza Split High Dose Preservative Free IM 09/29/2017    Influenza TIV (IM) 10/17/2008, 10/01/2014    Influenza, high dose seasonal 0 7 mL 10/05/2018, 10/02/2019    Pneumococcal Conjugate 13-Valent 12/18/2014    Pneumococcal Polysaccharide PPV23 06/16/2010    Td (adult), adsorbed 04/05/2007    Tdap 12/03/2015, 01/10/2017    Zoster 09/09/2009    Zoster Vaccine Recombinant 12/06/2019, 05/04/2020      Health Maintenance: There are no preventive care reminders to display for this patient  Topic Date Due    Influenza Vaccine  07/01/2020      Medicare Screening Tests and Risk Assessments: Minh Pedroza is here for her Subsequent Wellness visit  Health Risk Assessment:   Patient rates overall health as good  Patient feels that their physical health rating is same  Eyesight was rated as same  Hearing was rated as same  Patient feels that their emotional and mental health rating is same  Pain experienced in the last 7 days has been none  Patient states that she has experienced no weight loss or gain in last 6 months  Depression Screening:   PHQ-2 Score: 0      Fall Risk Screening: In the past year, patient has experienced: no history of falling in past year      Urinary Incontinence Screening:   Patient has not leaked urine accidently in the last six months  Home Safety:  Patient does not have trouble with stairs inside or outside of their home  Patient has working smoke alarms and has no working carbon monoxide detector  Home safety hazards include: none  Nutrition:   Current diet is Regular  Medications:   Patient is currently taking over-the-counter supplements  OTC medications include: see medication list  Patient is able to manage medications  Activities of Daily Living (ADLs)/Instrumental Activities of Daily Living (IADLs):   Walk and transfer into and out of bed and chair?: Yes  Dress and groom yourself?: Yes    Bathe or shower yourself?: Yes    Feed yourself? Yes  Do your laundry/housekeeping?: Yes  Manage your money, pay your bills and track your expenses?: Yes  Make your own meals?: Yes    Do your own shopping?: Yes    Previous Hospitalizations:   Any hospitalizations or ED visits within the last 12 months?: No      Advance Care Planning:   Living will: Yes    Durable POA for healthcare:  Yes    Advanced directive: Yes      Cognitive Screening:   Provider or family/friend/caregiver concerned regarding cognition?: No    PREVENTIVE SCREENINGS      Cardiovascular Screening:    General: Screening Not Indicated and History Lipid Disorder      Diabetes Screening:     General: Screening Current      Breast Cancer Screening: General: Screening Current      Cervical Cancer Screening:    General: Screening Not Indicated      Abdominal Aortic Aneurysm (AAA) Screening:        General: Screening Not Indicated      Lung Cancer Screening:     General: Screening Not Indicated      Hepatitis C Screening:    General: Screening Not Indicated    No exam data present     Physical Exam:     /74   Pulse 78   Temp 98 2 °F (36 8 °C)   Ht 5' 4" (1 626 m)   Wt 71 3 kg (157 lb 3 2 oz)   LMP  (LMP Unknown)   SpO2 96%   BMI 26 98 kg/m²     Physical Exam     Morena Green MD

## 2021-01-01 ENCOUNTER — APPOINTMENT (INPATIENT)
Dept: VASCULAR ULTRASOUND | Facility: HOSPITAL | Age: 76
DRG: 683 | End: 2021-01-01
Payer: COMMERCIAL

## 2021-01-01 ENCOUNTER — PATIENT OUTREACH (OUTPATIENT)
Dept: HEMATOLOGY ONCOLOGY | Facility: CLINIC | Age: 76
End: 2021-01-01

## 2021-01-01 ENCOUNTER — OFFICE VISIT (OUTPATIENT)
Dept: INTERNAL MEDICINE CLINIC | Facility: CLINIC | Age: 76
End: 2021-01-01
Payer: COMMERCIAL

## 2021-01-01 ENCOUNTER — LAB (OUTPATIENT)
Dept: LAB | Age: 76
End: 2021-01-01
Payer: COMMERCIAL

## 2021-01-01 ENCOUNTER — HOSPITAL ENCOUNTER (INPATIENT)
Facility: HOSPITAL | Age: 76
LOS: 3 days | Discharge: HOME | DRG: 330 | End: 2021-06-06
Attending: COLON & RECTAL SURGERY | Admitting: COLON & RECTAL SURGERY
Payer: COMMERCIAL

## 2021-01-01 ENCOUNTER — APPOINTMENT (EMERGENCY)
Dept: RADIOLOGY | Facility: HOSPITAL | Age: 76
End: 2021-01-01
Payer: COMMERCIAL

## 2021-01-01 ENCOUNTER — APPOINTMENT (INPATIENT)
Dept: RADIOLOGY | Facility: HOSPITAL | Age: 76
DRG: 683 | End: 2021-01-01
Payer: COMMERCIAL

## 2021-01-01 ENCOUNTER — TRANSITIONAL CARE MANAGEMENT (OUTPATIENT)
Dept: INTERNAL MEDICINE CLINIC | Facility: CLINIC | Age: 76
End: 2021-01-01

## 2021-01-01 ENCOUNTER — APPOINTMENT (OUTPATIENT)
Dept: LAB | Age: 76
DRG: 330 | End: 2021-01-01
Payer: COMMERCIAL

## 2021-01-01 ENCOUNTER — HOSPITAL ENCOUNTER (EMERGENCY)
Facility: HOSPITAL | Age: 76
Discharge: HOME | End: 2021-02-08
Attending: EMERGENCY MEDICINE
Payer: COMMERCIAL

## 2021-01-01 ENCOUNTER — OFFICE VISIT (OUTPATIENT)
Dept: URGENT CARE | Age: 76
End: 2021-01-01
Payer: COMMERCIAL

## 2021-01-01 ENCOUNTER — APPOINTMENT (EMERGENCY)
Dept: RADIOLOGY | Facility: HOSPITAL | Age: 76
DRG: 683 | End: 2021-01-01
Payer: COMMERCIAL

## 2021-01-01 ENCOUNTER — PATIENT OUTREACH (OUTPATIENT)
Dept: CASE MANAGEMENT | Facility: HOSPITAL | Age: 76
End: 2021-01-01

## 2021-01-01 ENCOUNTER — CONSULT (OUTPATIENT)
Dept: HEMATOLOGY ONCOLOGY | Facility: CLINIC | Age: 76
End: 2021-01-01
Payer: COMMERCIAL

## 2021-01-01 ENCOUNTER — IMMUNIZATIONS (OUTPATIENT)
Dept: FAMILY MEDICINE CLINIC | Facility: HOSPITAL | Age: 76
End: 2021-01-01

## 2021-01-01 ENCOUNTER — CLINICAL SUPPORT (OUTPATIENT)
Dept: INTERNAL MEDICINE CLINIC | Facility: CLINIC | Age: 76
End: 2021-01-01
Payer: COMMERCIAL

## 2021-01-01 ENCOUNTER — LAB REQUISITION (OUTPATIENT)
Dept: LAB | Facility: HOSPITAL | Age: 76
End: 2021-01-01
Payer: COMMERCIAL

## 2021-01-01 ENCOUNTER — OFFICE VISIT (OUTPATIENT)
Dept: GASTROENTEROLOGY | Facility: CLINIC | Age: 76
End: 2021-01-01
Payer: COMMERCIAL

## 2021-01-01 ENCOUNTER — HOSPITAL ENCOUNTER (INPATIENT)
Facility: HOSPITAL | Age: 76
LOS: 1 days | DRG: 683 | End: 2021-07-24
Attending: EMERGENCY MEDICINE | Admitting: INTERNAL MEDICINE
Payer: COMMERCIAL

## 2021-01-01 ENCOUNTER — ANESTHESIA EVENT (OUTPATIENT)
Dept: PERIOP | Facility: HOSPITAL | Age: 76
DRG: 330 | End: 2021-01-01
Payer: COMMERCIAL

## 2021-01-01 ENCOUNTER — HOSPITAL ENCOUNTER (OUTPATIENT)
Dept: INFUSION CENTER | Facility: HOSPITAL | Age: 76
Discharge: HOME | End: 2021-05-26
Attending: STUDENT IN AN ORGANIZED HEALTH CARE EDUCATION/TRAINING PROGRAM
Payer: COMMERCIAL

## 2021-01-01 ENCOUNTER — HOSPITAL ENCOUNTER (OUTPATIENT)
Dept: RADIOLOGY | Facility: HOSPITAL | Age: 76
Discharge: HOME | End: 2021-07-14
Attending: INTERNAL MEDICINE | Admitting: RADIOLOGY
Payer: COMMERCIAL

## 2021-01-01 ENCOUNTER — ANESTHESIA EVENT (OUTPATIENT)
Dept: GASTROENTEROLOGY | Facility: AMBULARY SURGERY CENTER | Age: 76
End: 2021-01-01

## 2021-01-01 ENCOUNTER — HOSPITAL ENCOUNTER (OUTPATIENT)
Dept: INFUSION CENTER | Facility: CLINIC | Age: 76
Discharge: HOME | DRG: 683 | End: 2021-07-23
Payer: COMMERCIAL

## 2021-01-01 ENCOUNTER — HOSPITAL ENCOUNTER (OUTPATIENT)
Dept: INFUSION CENTER | Facility: HOSPITAL | Age: 76
End: 2021-01-01
Attending: INTERNAL MEDICINE

## 2021-01-01 ENCOUNTER — HOSPITAL ENCOUNTER (OUTPATIENT)
Dept: INFUSION CENTER | Facility: CLINIC | Age: 76
Discharge: HOME | DRG: 683 | End: 2021-07-21
Payer: COMMERCIAL

## 2021-01-01 ENCOUNTER — HOSPITAL ENCOUNTER (OUTPATIENT)
Dept: RADIOLOGY | Age: 76
Discharge: HOME | End: 2021-04-19
Payer: COMMERCIAL

## 2021-01-01 ENCOUNTER — ANESTHESIA (OUTPATIENT)
Dept: PERIOP | Facility: HOSPITAL | Age: 76
DRG: 330 | End: 2021-01-01
Payer: COMMERCIAL

## 2021-01-01 ENCOUNTER — TELEPHONE (OUTPATIENT)
Dept: INTERNAL MEDICINE CLINIC | Facility: CLINIC | Age: 76
End: 2021-01-01

## 2021-01-01 ENCOUNTER — ANESTHESIA (OUTPATIENT)
Dept: GASTROENTEROLOGY | Facility: AMBULARY SURGERY CENTER | Age: 76
End: 2021-01-01

## 2021-01-01 ENCOUNTER — APPOINTMENT (OUTPATIENT)
Dept: LAB | Age: 76
End: 2021-01-01
Payer: COMMERCIAL

## 2021-01-01 ENCOUNTER — TELEPHONE (OUTPATIENT)
Dept: RADIOLOGY | Facility: HOSPITAL | Age: 76
End: 2021-01-01

## 2021-01-01 ENCOUNTER — TELEPHONE (OUTPATIENT)
Dept: SURGICAL ONCOLOGY | Facility: CLINIC | Age: 76
End: 2021-01-01

## 2021-01-01 ENCOUNTER — TELEPHONE (OUTPATIENT)
Dept: GASTROENTEROLOGY | Facility: CLINIC | Age: 76
End: 2021-01-01

## 2021-01-01 ENCOUNTER — TRANSCRIBE ORDERS (OUTPATIENT)
Dept: ADMINISTRATIVE | Age: 76
End: 2021-01-01

## 2021-01-01 ENCOUNTER — TELEPHONE (OUTPATIENT)
Dept: HEMATOLOGY ONCOLOGY | Facility: CLINIC | Age: 76
End: 2021-01-01

## 2021-01-01 ENCOUNTER — HOSPITAL ENCOUNTER (OUTPATIENT)
Dept: GASTROENTEROLOGY | Facility: AMBULARY SURGERY CENTER | Age: 76
Setting detail: OUTPATIENT SURGERY
Discharge: HOME | End: 2021-04-12
Attending: INTERNAL MEDICINE
Payer: COMMERCIAL

## 2021-01-01 VITALS
DIASTOLIC BLOOD PRESSURE: 106 MMHG | WEIGHT: 159.17 LBS | BODY MASS INDEX: 27.17 KG/M2 | TEMPERATURE: 97.9 F | OXYGEN SATURATION: 70 % | RESPIRATION RATE: 9 BRPM | SYSTOLIC BLOOD PRESSURE: 165 MMHG | HEIGHT: 64 IN

## 2021-01-01 VITALS
HEART RATE: 81 BPM | TEMPERATURE: 98.4 F | WEIGHT: 143 LBS | OXYGEN SATURATION: 95 % | BODY MASS INDEX: 24.41 KG/M2 | DIASTOLIC BLOOD PRESSURE: 75 MMHG | RESPIRATION RATE: 19 BRPM | SYSTOLIC BLOOD PRESSURE: 142 MMHG | HEIGHT: 64 IN

## 2021-01-01 VITALS
OXYGEN SATURATION: 97 % | TEMPERATURE: 97.5 F | HEART RATE: 67 BPM | SYSTOLIC BLOOD PRESSURE: 130 MMHG | DIASTOLIC BLOOD PRESSURE: 60 MMHG | RESPIRATION RATE: 18 BRPM

## 2021-01-01 VITALS
OXYGEN SATURATION: 98 % | RESPIRATION RATE: 20 BRPM | WEIGHT: 157 LBS | HEART RATE: 80 BPM | BODY MASS INDEX: 26.95 KG/M2 | TEMPERATURE: 98.1 F | SYSTOLIC BLOOD PRESSURE: 149 MMHG | DIASTOLIC BLOOD PRESSURE: 116 MMHG

## 2021-01-01 VITALS
SYSTOLIC BLOOD PRESSURE: 118 MMHG | HEIGHT: 64 IN | BODY MASS INDEX: 25.33 KG/M2 | OXYGEN SATURATION: 97 % | HEART RATE: 93 BPM | DIASTOLIC BLOOD PRESSURE: 66 MMHG | WEIGHT: 148.4 LBS | TEMPERATURE: 97.5 F

## 2021-01-01 VITALS
RESPIRATION RATE: 16 BRPM | HEIGHT: 64 IN | HEART RATE: 78 BPM | WEIGHT: 139.77 LBS | BODY MASS INDEX: 23.86 KG/M2 | DIASTOLIC BLOOD PRESSURE: 78 MMHG | TEMPERATURE: 98.3 F | SYSTOLIC BLOOD PRESSURE: 138 MMHG

## 2021-01-01 VITALS
RESPIRATION RATE: 20 BRPM | OXYGEN SATURATION: 98 % | SYSTOLIC BLOOD PRESSURE: 146 MMHG | DIASTOLIC BLOOD PRESSURE: 67 MMHG | HEART RATE: 90 BPM | TEMPERATURE: 98.1 F

## 2021-01-01 VITALS
DIASTOLIC BLOOD PRESSURE: 72 MMHG | OXYGEN SATURATION: 98 % | TEMPERATURE: 97.7 F | SYSTOLIC BLOOD PRESSURE: 130 MMHG | WEIGHT: 140.3 LBS | HEIGHT: 64 IN | BODY MASS INDEX: 23.95 KG/M2 | HEART RATE: 84 BPM

## 2021-01-01 VITALS
OXYGEN SATURATION: 99 % | DIASTOLIC BLOOD PRESSURE: 70 MMHG | RESPIRATION RATE: 18 BRPM | HEART RATE: 83 BPM | BODY MASS INDEX: 24.07 KG/M2 | WEIGHT: 141 LBS | TEMPERATURE: 97.7 F | HEIGHT: 64 IN | SYSTOLIC BLOOD PRESSURE: 152 MMHG

## 2021-01-01 VITALS
DIASTOLIC BLOOD PRESSURE: 82 MMHG | HEART RATE: 92 BPM | HEIGHT: 64 IN | SYSTOLIC BLOOD PRESSURE: 176 MMHG | BODY MASS INDEX: 23.9 KG/M2 | WEIGHT: 140 LBS | OXYGEN SATURATION: 97 % | RESPIRATION RATE: 16 BRPM | TEMPERATURE: 98.5 F

## 2021-01-01 VITALS
BODY MASS INDEX: 25.4 KG/M2 | WEIGHT: 148.8 LBS | SYSTOLIC BLOOD PRESSURE: 138 MMHG | TEMPERATURE: 97.6 F | HEART RATE: 90 BPM | HEIGHT: 64 IN | DIASTOLIC BLOOD PRESSURE: 71 MMHG

## 2021-01-01 VITALS
DIASTOLIC BLOOD PRESSURE: 69 MMHG | RESPIRATION RATE: 16 BRPM | SYSTOLIC BLOOD PRESSURE: 138 MMHG | TEMPERATURE: 97.6 F | HEART RATE: 80 BPM

## 2021-01-01 VITALS
OXYGEN SATURATION: 99 % | DIASTOLIC BLOOD PRESSURE: 70 MMHG | SYSTOLIC BLOOD PRESSURE: 118 MMHG | HEART RATE: 108 BPM | RESPIRATION RATE: 22 BRPM

## 2021-01-01 DIAGNOSIS — D70.1 CHEMOTHERAPY INDUCED NEUTROPENIA (HCC): Primary | ICD-10-CM

## 2021-01-01 DIAGNOSIS — C18.9 COLON ADENOCARCINOMA (HCC): Primary | ICD-10-CM

## 2021-01-01 DIAGNOSIS — R73.01 IMPAIRED FASTING BLOOD SUGAR: ICD-10-CM

## 2021-01-01 DIAGNOSIS — K63.89 OTHER SPECIFIED DISEASES OF INTESTINE: ICD-10-CM

## 2021-01-01 DIAGNOSIS — N28.9 RENAL INSUFFICIENCY: ICD-10-CM

## 2021-01-01 DIAGNOSIS — C18.9 COLON ADENOCARCINOMA (HCC): ICD-10-CM

## 2021-01-01 DIAGNOSIS — I10 HYPERTENSION, ESSENTIAL, BENIGN: ICD-10-CM

## 2021-01-01 DIAGNOSIS — Z23 ENCOUNTER FOR IMMUNIZATION: ICD-10-CM

## 2021-01-01 DIAGNOSIS — K63.9 COLONIC THICKENING: Primary | ICD-10-CM

## 2021-01-01 DIAGNOSIS — J30.9 ALLERGIC RHINITIS, UNSPECIFIED SEASONALITY, UNSPECIFIED TRIGGER: ICD-10-CM

## 2021-01-01 DIAGNOSIS — R79.89 ELEVATED D-DIMER: ICD-10-CM

## 2021-01-01 DIAGNOSIS — T45.1X5A CHEMOTHERAPY INDUCED NEUTROPENIA (HCC): ICD-10-CM

## 2021-01-01 DIAGNOSIS — K63.89 COLONIC MASS: ICD-10-CM

## 2021-01-01 DIAGNOSIS — K52.9 COLITIS: ICD-10-CM

## 2021-01-01 DIAGNOSIS — D70.1 CHEMOTHERAPY INDUCED NEUTROPENIA (HCC): ICD-10-CM

## 2021-01-01 DIAGNOSIS — D64.9 ANEMIA, UNSPECIFIED TYPE: Primary | ICD-10-CM

## 2021-01-01 DIAGNOSIS — J45.30 MILD PERSISTENT ASTHMA WITHOUT COMPLICATION: ICD-10-CM

## 2021-01-01 DIAGNOSIS — C18.4 ADENOCARCINOMA OF TRANSVERSE COLON (HCC): ICD-10-CM

## 2021-01-01 DIAGNOSIS — E78.2 MIXED HYPERLIPIDEMIA: ICD-10-CM

## 2021-01-01 DIAGNOSIS — R06.00 DYSPNEA ON EXERTION: Primary | ICD-10-CM

## 2021-01-01 DIAGNOSIS — I10 BENIGN ESSENTIAL HYPERTENSION: ICD-10-CM

## 2021-01-01 DIAGNOSIS — I10 BENIGN ESSENTIAL HYPERTENSION: Primary | ICD-10-CM

## 2021-01-01 DIAGNOSIS — K57.32 DIVERTICULITIS OF LARGE INTESTINE WITHOUT PERFORATION OR ABSCESS WITHOUT BLEEDING: Primary | ICD-10-CM

## 2021-01-01 DIAGNOSIS — C18.9 MALIGNANT NEOPLASM OF COLON, UNSPECIFIED PART OF COLON (HCC): ICD-10-CM

## 2021-01-01 DIAGNOSIS — R77.8 ELEVATED TROPONIN: ICD-10-CM

## 2021-01-01 DIAGNOSIS — D50.0 IRON DEFICIENCY ANEMIA DUE TO CHRONIC BLOOD LOSS: ICD-10-CM

## 2021-01-01 DIAGNOSIS — E87.6 HYPOKALEMIA: ICD-10-CM

## 2021-01-01 DIAGNOSIS — Z01.810 ENCOUNTER FOR PRE-OPERATIVE CARDIOVASCULAR CLEARANCE: Primary | ICD-10-CM

## 2021-01-01 DIAGNOSIS — R93.5 ABNORMAL FINDINGS ON DIAGNOSTIC IMAGING OF OTHER ABDOMINAL REGIONS, INCLUDING RETROPERITONEUM: ICD-10-CM

## 2021-01-01 DIAGNOSIS — K63.89 MALAKOPLAKIA OF ILEUM: Primary | ICD-10-CM

## 2021-01-01 DIAGNOSIS — K57.32 DIVERTICULITIS OF LARGE INTESTINE WITHOUT PERFORATION OR ABSCESS WITHOUT BLEEDING: ICD-10-CM

## 2021-01-01 DIAGNOSIS — K63.89 COLONIC MASS: Primary | ICD-10-CM

## 2021-01-01 DIAGNOSIS — K57.92 DIVERTICULITIS: ICD-10-CM

## 2021-01-01 DIAGNOSIS — T45.1X5A CHEMOTHERAPY INDUCED NEUTROPENIA (HCC): Primary | ICD-10-CM

## 2021-01-01 DIAGNOSIS — I26.99 PULMONARY EMBOLI (HCC): ICD-10-CM

## 2021-01-01 DIAGNOSIS — Z23 ENCOUNTER FOR IMMUNIZATION: Primary | ICD-10-CM

## 2021-01-01 DIAGNOSIS — N17.9 AKI (ACUTE KIDNEY INJURY) (HCC): ICD-10-CM

## 2021-01-01 DIAGNOSIS — R10.32 LLQ ABDOMINAL PAIN: Primary | ICD-10-CM

## 2021-01-01 DIAGNOSIS — D64.9 ANEMIA, UNSPECIFIED TYPE: ICD-10-CM

## 2021-01-01 LAB
ABO GROUP BLD BPU: NORMAL
ABO GROUP BLD BPU: NORMAL
ABO GROUP BLD: NORMAL
ACANTHOCYTES BLD QL SMEAR: PRESENT
ALBUMIN SERPL BCP-MCNC: 1.9 G/DL (ref 3.5–5)
ALBUMIN SERPL BCP-MCNC: 3.2 G/DL (ref 3.5–5)
ALBUMIN SERPL BCP-MCNC: 3.5 G/DL (ref 3.5–5)
ALBUMIN SERPL BCP-MCNC: 3.5 G/DL (ref 3.5–5)
ALP SERPL-CCNC: 103 U/L (ref 46–116)
ALP SERPL-CCNC: 60 U/L (ref 46–116)
ALP SERPL-CCNC: 75 U/L (ref 46–116)
ALP SERPL-CCNC: 82 U/L (ref 46–116)
ALT SERPL W P-5'-P-CCNC: 20 U/L (ref 12–78)
ALT SERPL W P-5'-P-CCNC: 30 U/L (ref 12–78)
ALT SERPL W P-5'-P-CCNC: 38 U/L (ref 12–78)
ALT SERPL W P-5'-P-CCNC: 98 U/L (ref 12–78)
ANION GAP SERPL CALCULATED.3IONS-SCNC: 10 MMOL/L (ref 4–13)
ANION GAP SERPL CALCULATED.3IONS-SCNC: 11 MMOL/L (ref 4–13)
ANION GAP SERPL CALCULATED.3IONS-SCNC: 17 MMOL/L (ref 4–13)
ANION GAP SERPL CALCULATED.3IONS-SCNC: 18 MMOL/L (ref 4–13)
ANION GAP SERPL CALCULATED.3IONS-SCNC: 6 MMOL/L (ref 4–13)
ANION GAP SERPL CALCULATED.3IONS-SCNC: 7 MMOL/L (ref 4–13)
ANION GAP SERPL CALCULATED.3IONS-SCNC: 8 MMOL/L (ref 4–13)
ANION GAP SERPL CALCULATED.3IONS-SCNC: 8 MMOL/L (ref 4–13)
ANISOCYTOSIS BLD QL SMEAR: PRESENT
APTT PPP: 205 SECONDS (ref 23–37)
APTT PPP: 26 SECONDS (ref 23–37)
APTT PPP: 71 SECONDS (ref 23–37)
AST SERPL W P-5'-P-CCNC: 114 U/L (ref 5–45)
AST SERPL W P-5'-P-CCNC: 14 U/L (ref 5–45)
AST SERPL W P-5'-P-CCNC: 18 U/L (ref 5–45)
AST SERPL W P-5'-P-CCNC: 30 U/L (ref 5–45)
ATRIAL RATE: 82 BPM
BACTERIA UR CULT: NORMAL
BACTERIA UR QL AUTO: ABNORMAL /HPF
BACTERIA UR QL AUTO: ABNORMAL /HPF
BACTERIA UR QL AUTO: NORMAL /HPF
BASE EXCESS BLDA CALC-SCNC: -15 MMOL/L (ref -2–3)
BASE EXCESS BLDA CALC-SCNC: -8 MMOL/L (ref -2–3)
BASE EXCESS BLDA CALC-SCNC: -8 MMOL/L (ref -2–3)
BASOPHILS # BLD AUTO: 0.01 THOUSANDS/ΜL (ref 0–0.1)
BASOPHILS # BLD AUTO: 0.01 THOUSANDS/ΜL (ref 0–0.1)
BASOPHILS # BLD AUTO: 0.03 THOUSANDS/ΜL (ref 0–0.1)
BASOPHILS # BLD AUTO: 0.04 THOUSANDS/ΜL (ref 0–0.1)
BASOPHILS # BLD AUTO: 0.04 THOUSANDS/ΜL (ref 0–0.1)
BASOPHILS # BLD MANUAL: 0 THOUSAND/UL (ref 0–0.1)
BASOPHILS NFR BLD AUTO: 0 % (ref 0–1)
BASOPHILS NFR BLD AUTO: 1 % (ref 0–1)
BASOPHILS NFR MAR MANUAL: 0 % (ref 0–1)
BILIRUB DIRECT SERPL-MCNC: 0.08 MG/DL (ref 0–0.2)
BILIRUB SERPL-MCNC: 0.21 MG/DL (ref 0.2–1)
BILIRUB SERPL-MCNC: 0.42 MG/DL (ref 0.2–1)
BILIRUB SERPL-MCNC: 0.56 MG/DL (ref 0.2–1)
BILIRUB SERPL-MCNC: 0.6 MG/DL (ref 0.2–1)
BILIRUB UR QL STRIP: NEGATIVE
BLD GP AB SCN SERPL QL: NEGATIVE
BPU ID: NORMAL
BPU ID: NORMAL
BUN SERPL-MCNC: 11 MG/DL (ref 5–25)
BUN SERPL-MCNC: 12 MG/DL (ref 5–25)
BUN SERPL-MCNC: 13 MG/DL (ref 5–25)
BUN SERPL-MCNC: 14 MG/DL (ref 5–25)
BUN SERPL-MCNC: 18 MG/DL (ref 5–25)
BUN SERPL-MCNC: 30 MG/DL (ref 5–25)
BUN SERPL-MCNC: 31 MG/DL (ref 5–25)
BUN SERPL-MCNC: 31 MG/DL (ref 5–25)
BUN SERPL-MCNC: 6 MG/DL (ref 5–25)
BUN SERPL-MCNC: 8 MG/DL (ref 5–25)
BURR CELLS BLD QL SMEAR: PRESENT
CA-I BLD-SCNC: 1.09 MMOL/L (ref 1.12–1.32)
CALCIUM ALBUM COR SERPL-MCNC: 10.3 MG/DL (ref 8.3–10.1)
CALCIUM SERPL-MCNC: 10.3 MG/DL (ref 8.3–10.1)
CALCIUM SERPL-MCNC: 15.7 MG/DL (ref 8.3–10.1)
CALCIUM SERPL-MCNC: 8.4 MG/DL (ref 8.3–10.1)
CALCIUM SERPL-MCNC: 9 MG/DL (ref 8.3–10.1)
CALCIUM SERPL-MCNC: 9 MG/DL (ref 8.3–10.1)
CALCIUM SERPL-MCNC: 9.3 MG/DL (ref 8.3–10.1)
CALCIUM SERPL-MCNC: 9.4 MG/DL (ref 8.3–10.1)
CALCIUM SERPL-MCNC: 9.7 MG/DL (ref 8.3–10.1)
CALCIUM SERPL-MCNC: 9.8 MG/DL (ref 8.3–10.1)
CALCIUM SERPL-MCNC: 9.9 MG/DL (ref 8.3–10.1)
CEA SERPL-MCNC: 5.9 NG/ML (ref 0–3)
CHLORIDE SERPL-SCNC: 102 MMOL/L (ref 100–108)
CHLORIDE SERPL-SCNC: 102 MMOL/L (ref 100–108)
CHLORIDE SERPL-SCNC: 103 MMOL/L (ref 100–108)
CHLORIDE SERPL-SCNC: 104 MMOL/L (ref 100–108)
CHLORIDE SERPL-SCNC: 106 MMOL/L (ref 100–108)
CHLORIDE SERPL-SCNC: 108 MMOL/L (ref 100–108)
CHLORIDE SERPL-SCNC: 109 MMOL/L (ref 100–108)
CHLORIDE SERPL-SCNC: 112 MMOL/L (ref 100–108)
CHLORIDE SERPL-SCNC: 113 MMOL/L (ref 100–108)
CHLORIDE SERPL-SCNC: 98 MMOL/L (ref 100–108)
CHOLEST SERPL-MCNC: 149 MG/DL (ref 50–200)
CLARITY UR: CLEAR
CO2 SERPL-SCNC: 20 MMOL/L (ref 21–32)
CO2 SERPL-SCNC: 23 MMOL/L (ref 21–32)
CO2 SERPL-SCNC: 24 MMOL/L (ref 21–32)
CO2 SERPL-SCNC: 24 MMOL/L (ref 21–32)
CO2 SERPL-SCNC: 26 MMOL/L (ref 21–32)
CO2 SERPL-SCNC: 27 MMOL/L (ref 21–32)
COLOR UR: YELLOW
CREAT SERPL-MCNC: 0.72 MG/DL (ref 0.6–1.3)
CREAT SERPL-MCNC: 0.74 MG/DL (ref 0.6–1.3)
CREAT SERPL-MCNC: 0.81 MG/DL (ref 0.6–1.3)
CREAT SERPL-MCNC: 0.92 MG/DL (ref 0.6–1.3)
CREAT SERPL-MCNC: 0.95 MG/DL (ref 0.6–1.3)
CREAT SERPL-MCNC: 0.96 MG/DL (ref 0.6–1.3)
CREAT SERPL-MCNC: 1.02 MG/DL (ref 0.6–1.3)
CREAT SERPL-MCNC: 1.62 MG/DL (ref 0.6–1.3)
CREAT SERPL-MCNC: 1.64 MG/DL (ref 0.6–1.3)
CREAT SERPL-MCNC: 2.02 MG/DL (ref 0.6–1.3)
CROSSMATCH: NORMAL
D DIMER PPP FEU-MCNC: 2.75 UG/ML FEU
EOSINOPHIL # BLD AUTO: 0 THOUSAND/ΜL (ref 0–0.61)
EOSINOPHIL # BLD AUTO: 0 THOUSAND/ΜL (ref 0–0.61)
EOSINOPHIL # BLD AUTO: 0.07 THOUSAND/ΜL (ref 0–0.61)
EOSINOPHIL # BLD AUTO: 0.17 THOUSAND/ΜL (ref 0–0.61)
EOSINOPHIL # BLD AUTO: 0.32 THOUSAND/ΜL (ref 0–0.61)
EOSINOPHIL # BLD AUTO: 0.34 THOUSAND/ΜL (ref 0–0.61)
EOSINOPHIL # BLD AUTO: 0.44 THOUSAND/ΜL (ref 0–0.61)
EOSINOPHIL # BLD MANUAL: 1.15 THOUSAND/UL (ref 0–0.4)
EOSINOPHIL NFR BLD AUTO: 0 % (ref 0–6)
EOSINOPHIL NFR BLD AUTO: 0 % (ref 0–6)
EOSINOPHIL NFR BLD AUTO: 1 % (ref 0–6)
EOSINOPHIL NFR BLD AUTO: 3 % (ref 0–6)
EOSINOPHIL NFR BLD AUTO: 4 % (ref 0–6)
EOSINOPHIL NFR BLD AUTO: 4 % (ref 0–6)
EOSINOPHIL NFR BLD AUTO: 6 % (ref 0–6)
EOSINOPHIL NFR BLD MANUAL: 5 % (ref 0–6)
ERYTHROCYTE [DISTWIDTH] IN BLOOD BY AUTOMATED COUNT: 15 % (ref 11.6–15.1)
ERYTHROCYTE [DISTWIDTH] IN BLOOD BY AUTOMATED COUNT: 15.5 % (ref 11.6–15.1)
ERYTHROCYTE [DISTWIDTH] IN BLOOD BY AUTOMATED COUNT: 16.5 % (ref 11.6–15.1)
ERYTHROCYTE [DISTWIDTH] IN BLOOD BY AUTOMATED COUNT: 17.4 % (ref 11.6–15.1)
ERYTHROCYTE [DISTWIDTH] IN BLOOD BY AUTOMATED COUNT: 18 % (ref 11.6–15.1)
ERYTHROCYTE [DISTWIDTH] IN BLOOD BY AUTOMATED COUNT: 18.1 % (ref 11.6–15.1)
ERYTHROCYTE [DISTWIDTH] IN BLOOD BY AUTOMATED COUNT: 18.3 % (ref 11.6–15.1)
ERYTHROCYTE [DISTWIDTH] IN BLOOD BY AUTOMATED COUNT: 18.9 % (ref 11.6–15.1)
ERYTHROCYTE [DISTWIDTH] IN BLOOD BY AUTOMATED COUNT: 18.9 % (ref 11.6–15.1)
EST. AVERAGE GLUCOSE BLD GHB EST-MCNC: 120 MG/DL
EST. AVERAGE GLUCOSE BLD GHB EST-MCNC: 123 MG/DL
FERRITIN SERPL-MCNC: 42 NG/ML (ref 8–388)
FIO2 GAS DIL.REBREATH: 100 L
GFR SERPL CREATININE-BSD FRML MDRD: 23 ML/MIN/1.73SQ M
GFR SERPL CREATININE-BSD FRML MDRD: 30 ML/MIN/1.73SQ M
GFR SERPL CREATININE-BSD FRML MDRD: 30 ML/MIN/1.73SQ M
GFR SERPL CREATININE-BSD FRML MDRD: 53 ML/MIN/1.73SQ M
GFR SERPL CREATININE-BSD FRML MDRD: 58 ML/MIN/1.73SQ M
GFR SERPL CREATININE-BSD FRML MDRD: 58 ML/MIN/1.73SQ M
GFR SERPL CREATININE-BSD FRML MDRD: 60 ML/MIN/1.73SQ M
GFR SERPL CREATININE-BSD FRML MDRD: 70 ML/MIN/1.73SQ M
GFR SERPL CREATININE-BSD FRML MDRD: 78 ML/MIN/1.73SQ M
GFR SERPL CREATININE-BSD FRML MDRD: 81 ML/MIN/1.73SQ M
GLUCOSE P FAST SERPL-MCNC: 100 MG/DL (ref 65–99)
GLUCOSE P FAST SERPL-MCNC: 102 MG/DL (ref 65–99)
GLUCOSE SERPL-MCNC: 109 MG/DL (ref 65–140)
GLUCOSE SERPL-MCNC: 115 MG/DL (ref 65–140)
GLUCOSE SERPL-MCNC: 117 MG/DL (ref 65–140)
GLUCOSE SERPL-MCNC: 119 MG/DL (ref 65–140)
GLUCOSE SERPL-MCNC: 136 MG/DL (ref 65–140)
GLUCOSE SERPL-MCNC: 209 MG/DL (ref 65–140)
GLUCOSE SERPL-MCNC: 281 MG/DL (ref 65–140)
GLUCOSE SERPL-MCNC: 287 MG/DL (ref 65–140)
GLUCOSE SERPL-MCNC: 324 MG/DL (ref 65–140)
GLUCOSE SERPL-MCNC: 358 MG/DL (ref 65–140)
GLUCOSE SERPL-MCNC: 95 MG/DL (ref 65–140)
GLUCOSE UR STRIP-MCNC: NEGATIVE MG/DL
HBA1C MFR BLD: 5.8 %
HBA1C MFR BLD: 5.9 %
HCO3 BLDA-SCNC: 16.8 MMOL/L (ref 22–28)
HCO3 BLDA-SCNC: 20.6 MMOL/L (ref 22–28)
HCO3 BLDA-SCNC: 21.3 MMOL/L (ref 22–28)
HCT VFR BLD AUTO: 33.7 % (ref 34.8–46.1)
HCT VFR BLD AUTO: 34.6 % (ref 34.8–46.1)
HCT VFR BLD AUTO: 36.4 % (ref 34.8–46.1)
HCT VFR BLD AUTO: 37.2 % (ref 34.8–46.1)
HCT VFR BLD AUTO: 37.5 % (ref 34.8–46.1)
HCT VFR BLD AUTO: 38.1 % (ref 34.8–46.1)
HCT VFR BLD AUTO: 38.9 % (ref 34.8–46.1)
HCT VFR BLD AUTO: 39.7 % (ref 34.8–46.1)
HCT VFR BLD AUTO: 41.5 % (ref 34.8–46.1)
HCT VFR BLD CALC: 32 % (ref 34.8–46.1)
HCT VFR BLD CALC: 33 % (ref 34.8–46.1)
HCT VFR BLD CALC: 38 % (ref 34.8–46.1)
HDLC SERPL-MCNC: 53 MG/DL
HGB BLD-MCNC: 10.2 G/DL (ref 11.5–15.4)
HGB BLD-MCNC: 10.7 G/DL (ref 11.5–15.4)
HGB BLD-MCNC: 11.1 G/DL (ref 11.5–15.4)
HGB BLD-MCNC: 11.2 G/DL (ref 11.5–15.4)
HGB BLD-MCNC: 11.4 G/DL (ref 11.5–15.4)
HGB BLD-MCNC: 11.5 G/DL (ref 11.5–15.4)
HGB BLD-MCNC: 11.9 G/DL (ref 11.5–15.4)
HGB BLD-MCNC: 12 G/DL (ref 11.5–15.4)
HGB BLD-MCNC: 12.7 G/DL (ref 11.5–15.4)
HGB BLD-MCNC: 12.9 G/DL (ref 11.5–15.4)
HGB BLDA-MCNC: 10.9 G/DL (ref 11.5–15.4)
HGB BLDA-MCNC: 11.2 G/DL (ref 11.5–15.4)
HGB BLDA-MCNC: 12.9 G/DL (ref 11.5–15.4)
HGB UR QL STRIP.AUTO: ABNORMAL
HGB UR QL STRIP.AUTO: NEGATIVE
HGB UR QL STRIP.AUTO: NEGATIVE
HYALINE CASTS #/AREA URNS LPF: NORMAL /LPF
IMM GRANULOCYTES # BLD AUTO: 0.01 THOUSAND/UL (ref 0–0.2)
IMM GRANULOCYTES # BLD AUTO: 0.01 THOUSAND/UL (ref 0–0.2)
IMM GRANULOCYTES # BLD AUTO: 0.02 THOUSAND/UL (ref 0–0.2)
IMM GRANULOCYTES # BLD AUTO: 0.03 THOUSAND/UL (ref 0–0.2)
IMM GRANULOCYTES NFR BLD AUTO: 0 % (ref 0–2)
INR PPP: 0.96 (ref 0.84–1.19)
INR PPP: 1.48 (ref 0.84–1.19)
IRON SATN MFR SERPL: 13 %
IRON SERPL-MCNC: 40 UG/DL (ref 50–170)
KETONES UR STRIP-MCNC: ABNORMAL MG/DL
KETONES UR STRIP-MCNC: ABNORMAL MG/DL
KETONES UR STRIP-MCNC: NEGATIVE MG/DL
LACTATE SERPL-SCNC: 12.4 MMOL/L (ref 0.5–2)
LACTATE SERPL-SCNC: 13.3 MMOL/L (ref 0.5–2)
LDLC SERPL CALC-MCNC: 84 MG/DL (ref 0–100)
LEUKOCYTE ESTERASE UR QL STRIP: ABNORMAL
LYMPHOCYTES # BLD AUTO: 1.18 THOUSANDS/ΜL (ref 0.6–4.47)
LYMPHOCYTES # BLD AUTO: 1.28 THOUSANDS/ΜL (ref 0.6–4.47)
LYMPHOCYTES # BLD AUTO: 1.28 THOUSANDS/ΜL (ref 0.6–4.47)
LYMPHOCYTES # BLD AUTO: 1.42 THOUSANDS/ΜL (ref 0.6–4.47)
LYMPHOCYTES # BLD AUTO: 1.69 THOUSANDS/ΜL (ref 0.6–4.47)
LYMPHOCYTES # BLD AUTO: 1.96 THOUSANDS/ΜL (ref 0.6–4.47)
LYMPHOCYTES # BLD AUTO: 2.04 THOUSANDS/ΜL (ref 0.6–4.47)
LYMPHOCYTES # BLD AUTO: 23 % (ref 14–44)
LYMPHOCYTES # BLD AUTO: 5.27 THOUSAND/UL (ref 0.6–4.47)
LYMPHOCYTES NFR BLD AUTO: 16 % (ref 14–44)
LYMPHOCYTES NFR BLD AUTO: 17 % (ref 14–44)
LYMPHOCYTES NFR BLD AUTO: 19 % (ref 14–44)
LYMPHOCYTES NFR BLD AUTO: 19 % (ref 14–44)
LYMPHOCYTES NFR BLD AUTO: 23 % (ref 14–44)
LYMPHOCYTES NFR BLD AUTO: 23 % (ref 14–44)
LYMPHOCYTES NFR BLD AUTO: 29 % (ref 14–44)
MAGNESIUM SERPL-MCNC: 2.3 MG/DL (ref 1.6–2.6)
MCH RBC QN AUTO: 23.1 PG (ref 26.8–34.3)
MCH RBC QN AUTO: 23.3 PG (ref 26.8–34.3)
MCH RBC QN AUTO: 23.4 PG (ref 26.8–34.3)
MCH RBC QN AUTO: 23.7 PG (ref 26.8–34.3)
MCH RBC QN AUTO: 23.8 PG (ref 26.8–34.3)
MCH RBC QN AUTO: 24 PG (ref 26.8–34.3)
MCH RBC QN AUTO: 25.1 PG (ref 26.8–34.3)
MCH RBC QN AUTO: 25.2 PG (ref 26.8–34.3)
MCH RBC QN AUTO: 25.9 PG (ref 26.8–34.3)
MCHC RBC AUTO-ENTMCNC: 30.3 G/DL (ref 31.4–37.4)
MCHC RBC AUTO-ENTMCNC: 30.5 G/DL (ref 31.4–37.4)
MCHC RBC AUTO-ENTMCNC: 30.6 G/DL (ref 31.4–37.4)
MCHC RBC AUTO-ENTMCNC: 30.6 G/DL (ref 31.4–37.4)
MCHC RBC AUTO-ENTMCNC: 30.7 G/DL (ref 31.4–37.4)
MCHC RBC AUTO-ENTMCNC: 30.9 G/DL (ref 31.4–37.4)
MCHC RBC AUTO-ENTMCNC: 31.1 G/DL (ref 31.4–37.4)
MCHC RBC AUTO-ENTMCNC: 31.5 G/DL (ref 31.4–37.4)
MCHC RBC AUTO-ENTMCNC: 32 G/DL (ref 31.4–37.4)
MCV RBC AUTO: 76 FL (ref 82–98)
MCV RBC AUTO: 76 FL (ref 82–98)
MCV RBC AUTO: 77 FL (ref 82–98)
MCV RBC AUTO: 79 FL (ref 82–98)
MCV RBC AUTO: 81 FL (ref 82–98)
MCV RBC AUTO: 84 FL (ref 82–98)
METAMYELOCYTES NFR BLD MANUAL: 4 % (ref 0–1)
MONOCYTES # BLD AUTO: 0.44 THOUSAND/ΜL (ref 0.17–1.22)
MONOCYTES # BLD AUTO: 0.46 THOUSAND/UL (ref 0–1.22)
MONOCYTES # BLD AUTO: 0.53 THOUSAND/ΜL (ref 0.17–1.22)
MONOCYTES # BLD AUTO: 0.59 THOUSAND/ΜL (ref 0.17–1.22)
MONOCYTES # BLD AUTO: 0.61 THOUSAND/ΜL (ref 0.17–1.22)
MONOCYTES # BLD AUTO: 0.66 THOUSAND/ΜL (ref 0.17–1.22)
MONOCYTES # BLD AUTO: 0.69 THOUSAND/ΜL (ref 0.17–1.22)
MONOCYTES # BLD AUTO: 0.7 THOUSAND/ΜL (ref 0.17–1.22)
MONOCYTES NFR BLD AUTO: 10 % (ref 4–12)
MONOCYTES NFR BLD AUTO: 7 % (ref 4–12)
MONOCYTES NFR BLD AUTO: 7 % (ref 4–12)
MONOCYTES NFR BLD AUTO: 8 % (ref 4–12)
MONOCYTES NFR BLD AUTO: 9 % (ref 4–12)
MONOCYTES NFR BLD: 2 % (ref 4–12)
MUCOUS THREADS UR QL AUTO: ABNORMAL
NEUTROPHILS # BLD AUTO: 3.74 THOUSANDS/ΜL (ref 1.85–7.62)
NEUTROPHILS # BLD AUTO: 4.69 THOUSANDS/ΜL (ref 1.85–7.62)
NEUTROPHILS # BLD AUTO: 4.89 THOUSANDS/ΜL (ref 1.85–7.62)
NEUTROPHILS # BLD AUTO: 5.04 THOUSANDS/ΜL (ref 1.85–7.62)
NEUTROPHILS # BLD AUTO: 5.67 THOUSANDS/ΜL (ref 1.85–7.62)
NEUTROPHILS # BLD AUTO: 5.82 THOUSANDS/ΜL (ref 1.85–7.62)
NEUTROPHILS # BLD AUTO: 5.89 THOUSANDS/ΜL (ref 1.85–7.62)
NEUTROPHILS # BLD MANUAL: 14.68 THOUSAND/UL (ref 1.85–7.62)
NEUTS BAND NFR BLD MANUAL: 31 % (ref 0–8)
NEUTS SEG NFR BLD AUTO: 33 % (ref 43–75)
NEUTS SEG NFR BLD AUTO: 55 % (ref 43–75)
NEUTS SEG NFR BLD AUTO: 65 % (ref 43–75)
NEUTS SEG NFR BLD AUTO: 67 % (ref 43–75)
NEUTS SEG NFR BLD AUTO: 67 % (ref 43–75)
NEUTS SEG NFR BLD AUTO: 70 % (ref 43–75)
NEUTS SEG NFR BLD AUTO: 76 % (ref 43–75)
NEUTS SEG NFR BLD AUTO: 76 % (ref 43–75)
NITRITE UR QL STRIP: NEGATIVE
NON-SQ EPI CELLS URNS QL MICRO: ABNORMAL /HPF
NON-SQ EPI CELLS URNS QL MICRO: ABNORMAL /HPF
NON-SQ EPI CELLS URNS QL MICRO: NORMAL /HPF
NRBC BLD AUTO-RTO: 0 /100 WBCS
NT-PROBNP SERPL-MCNC: 6660 PG/ML
OVALOCYTES BLD QL SMEAR: PRESENT
P AXIS: 80 DEGREES
PCO2 BLD: 19 MMOL/L (ref 21–32)
PCO2 BLD: 22 MMOL/L (ref 21–32)
PCO2 BLD: 23 MMOL/L (ref 21–32)
PCO2 BLD: 57.4 MM HG (ref 36–44)
PCO2 BLD: 63.7 MM HG (ref 36–44)
PCO2 BLD: 70.3 MM HG (ref 36–44)
PH BLD: 6.99 [PH] (ref 7.35–7.45)
PH BLD: 7.13 [PH] (ref 7.35–7.45)
PH BLD: 7.16 [PH] (ref 7.35–7.45)
PH UR STRIP.AUTO: 5.5 [PH] (ref 4.5–8)
PH UR STRIP.AUTO: 6 [PH]
PH UR STRIP.AUTO: 6 [PH] (ref 4.5–8)
PHOSPHATE SERPL-MCNC: 9.6 MG/DL (ref 2.3–4.1)
PLATELET # BLD AUTO: 214 THOUSANDS/UL (ref 149–390)
PLATELET # BLD AUTO: 215 THOUSANDS/UL (ref 149–390)
PLATELET # BLD AUTO: 234 THOUSANDS/UL (ref 149–390)
PLATELET # BLD AUTO: 261 THOUSANDS/UL (ref 149–390)
PLATELET # BLD AUTO: 263 THOUSANDS/UL (ref 149–390)
PLATELET # BLD AUTO: 302 THOUSANDS/UL (ref 149–390)
PLATELET # BLD AUTO: 314 THOUSANDS/UL (ref 149–390)
PLATELET # BLD AUTO: 322 THOUSANDS/UL (ref 149–390)
PLATELET # BLD AUTO: 328 THOUSANDS/UL (ref 149–390)
PLATELET BLD QL SMEAR: ADEQUATE
PMV BLD AUTO: 10.3 FL (ref 8.9–12.7)
PMV BLD AUTO: 10.6 FL (ref 8.9–12.7)
PMV BLD AUTO: 10.6 FL (ref 8.9–12.7)
PMV BLD AUTO: 10.7 FL (ref 8.9–12.7)
PMV BLD AUTO: 10.8 FL (ref 8.9–12.7)
PMV BLD AUTO: 11.3 FL (ref 8.9–12.7)
PMV BLD AUTO: 11.3 FL (ref 8.9–12.7)
PMV BLD AUTO: 11.4 FL (ref 8.9–12.7)
PMV BLD AUTO: 11.9 FL (ref 8.9–12.7)
PO2 BLD: 46 MM HG (ref 75–129)
PO2 BLD: 52 MM HG (ref 75–129)
PO2 BLD: 91 MM HG (ref 75–129)
POIKILOCYTOSIS BLD QL SMEAR: PRESENT
POTASSIUM BLD-SCNC: 3.3 MMOL/L (ref 3.5–5.3)
POTASSIUM BLD-SCNC: 3.4 MMOL/L (ref 3.5–5.3)
POTASSIUM BLD-SCNC: 3.4 MMOL/L (ref 3.5–5.3)
POTASSIUM SERPL-SCNC: 3.4 MMOL/L (ref 3.5–5.3)
POTASSIUM SERPL-SCNC: 3.4 MMOL/L (ref 3.5–5.3)
POTASSIUM SERPL-SCNC: 3.6 MMOL/L (ref 3.5–5.3)
POTASSIUM SERPL-SCNC: 3.6 MMOL/L (ref 3.5–5.3)
POTASSIUM SERPL-SCNC: 3.7 MMOL/L (ref 3.5–5.3)
POTASSIUM SERPL-SCNC: 3.8 MMOL/L (ref 3.5–5.3)
POTASSIUM SERPL-SCNC: 3.8 MMOL/L (ref 3.5–5.3)
POTASSIUM SERPL-SCNC: 4.6 MMOL/L (ref 3.5–5.3)
PR INTERVAL: 170 MS
PROCALCITONIN SERPL-MCNC: 0.15 NG/ML
PROT SERPL-MCNC: 4.4 G/DL (ref 6.4–8.2)
PROT SERPL-MCNC: 6.9 G/DL (ref 6.4–8.2)
PROT SERPL-MCNC: 7.2 G/DL (ref 6.4–8.2)
PROT SERPL-MCNC: 7.2 G/DL (ref 6.4–8.2)
PROT UR STRIP-MCNC: ABNORMAL MG/DL
PROT UR STRIP-MCNC: ABNORMAL MG/DL
PROT UR STRIP-MCNC: NEGATIVE MG/DL
PROTHROMBIN TIME: 12.9 SECONDS (ref 11.6–14.5)
PROTHROMBIN TIME: 18.1 SECONDS (ref 11.6–14.5)
QRS AXIS: -28 DEGREES
QRSD INTERVAL: 94 MS
QT INTERVAL: 422 MS
QTC INTERVAL: 484 MS
RBC # BLD AUTO: 4.41 MILLION/UL (ref 3.81–5.12)
RBC # BLD AUTO: 4.41 MILLION/UL (ref 3.81–5.12)
RBC # BLD AUTO: 4.49 MILLION/UL (ref 3.81–5.12)
RBC # BLD AUTO: 4.76 MILLION/UL (ref 3.81–5.12)
RBC # BLD AUTO: 4.86 MILLION/UL (ref 3.81–5.12)
RBC # BLD AUTO: 5 MILLION/UL (ref 3.81–5.12)
RBC # BLD AUTO: 5.04 MILLION/UL (ref 3.81–5.12)
RBC # BLD AUTO: 5.08 MILLION/UL (ref 3.81–5.12)
RBC # BLD AUTO: 5.14 MILLION/UL (ref 3.81–5.12)
RBC #/AREA URNS AUTO: ABNORMAL /HPF
RBC #/AREA URNS AUTO: ABNORMAL /HPF
RBC #/AREA URNS AUTO: NORMAL /HPF
RH BLD: POSITIVE
SAO2 % BLD FROM PO2: 66 % (ref 60–85)
SAO2 % BLD FROM PO2: 76 % (ref 60–85)
SAO2 % BLD FROM PO2: 90 % (ref 60–85)
SODIUM BLD-SCNC: 136 MMOL/L (ref 136–145)
SODIUM BLD-SCNC: 139 MMOL/L (ref 136–145)
SODIUM BLD-SCNC: 142 MMOL/L (ref 136–145)
SODIUM SERPL-SCNC: 135 MMOL/L (ref 136–145)
SODIUM SERPL-SCNC: 136 MMOL/L (ref 136–145)
SODIUM SERPL-SCNC: 137 MMOL/L (ref 136–145)
SODIUM SERPL-SCNC: 138 MMOL/L (ref 136–145)
SODIUM SERPL-SCNC: 141 MMOL/L (ref 136–145)
SODIUM SERPL-SCNC: 141 MMOL/L (ref 136–145)
SODIUM SERPL-SCNC: 142 MMOL/L (ref 136–145)
SODIUM SERPL-SCNC: 144 MMOL/L (ref 136–145)
SP GR UR STRIP.AUTO: 1.01 (ref 1–1.03)
SP GR UR STRIP.AUTO: 1.02 (ref 1–1.03)
SP GR UR STRIP.AUTO: >=1.03 (ref 1–1.03)
SPECIMEN EXPIRATION DATE: NORMAL
SPECIMEN SOURCE: ABNORMAL
T WAVE AXIS: 72 DEGREES
TIBC SERPL-MCNC: 312 UG/DL (ref 250–450)
TOTAL CELLS COUNTED SPEC: 100
TRIGL SERPL-MCNC: 59 MG/DL
TROPONIN I SERPL-MCNC: 0.49 NG/ML
TROPONIN I SERPL-MCNC: 0.52 NG/ML
TROPONIN I SERPL-MCNC: 0.85 NG/ML
TSH SERPL DL<=0.05 MIU/L-ACNC: 1.32 UIU/ML (ref 0.36–3.74)
TSH SERPL DL<=0.05 MIU/L-ACNC: 2.13 UIU/ML (ref 0.36–3.74)
UNIT DISPENSE STATUS: NORMAL
UNIT DISPENSE STATUS: NORMAL
UNIT PRODUCT CODE: NORMAL
UNIT PRODUCT CODE: NORMAL
UNIT RH: NORMAL
UNIT RH: NORMAL
UROBILINOGEN UR QL STRIP.AUTO: 0.2 E.U./DL
VARIANT LYMPHS # BLD AUTO: 2 %
VENTRICULAR RATE: 79 BPM
WBC # BLD AUTO: 22.93 THOUSAND/UL (ref 4.31–10.16)
WBC # BLD AUTO: 6.62 THOUSAND/UL (ref 4.31–10.16)
WBC # BLD AUTO: 6.88 THOUSAND/UL (ref 4.31–10.16)
WBC # BLD AUTO: 7.15 THOUSAND/UL (ref 4.31–10.16)
WBC # BLD AUTO: 7.35 THOUSAND/UL (ref 4.31–10.16)
WBC # BLD AUTO: 7.47 THOUSAND/UL (ref 4.31–10.16)
WBC # BLD AUTO: 7.47 THOUSAND/UL (ref 4.31–10.16)
WBC # BLD AUTO: 7.66 THOUSAND/UL (ref 4.31–10.16)
WBC # BLD AUTO: 9.02 THOUSAND/UL (ref 4.31–10.16)
WBC #/AREA URNS AUTO: ABNORMAL /HPF
WBC #/AREA URNS AUTO: ABNORMAL /HPF
WBC #/AREA URNS AUTO: NORMAL /HPF

## 2021-01-01 PROCEDURE — 84145 PROCALCITONIN (PCT): CPT | Performed by: PHYSICIAN ASSISTANT

## 2021-01-01 PROCEDURE — 15860 IV NJX TST VASC FLO FLAP/GRF: CPT | Performed by: COLON & RECTAL SURGERY

## 2021-01-01 PROCEDURE — 36415 COLL VENOUS BLD VENIPUNCTURE: CPT | Performed by: PHYSICIAN ASSISTANT

## 2021-01-01 PROCEDURE — 81288 MLH1 GENE: CPT

## 2021-01-01 PROCEDURE — 81001 URINALYSIS AUTO W/SCOPE: CPT

## 2021-01-01 PROCEDURE — 83036 HEMOGLOBIN GLYCOSYLATED A1C: CPT

## 2021-01-01 PROCEDURE — 45381 COLONOSCOPY SUBMUCOUS NJX: CPT | Performed by: INTERNAL MEDICINE

## 2021-01-01 PROCEDURE — 99285 EMERGENCY DEPT VISIT HI MDM: CPT | Performed by: PHYSICIAN ASSISTANT

## 2021-01-01 PROCEDURE — 0DBU4ZZ EXCISION OF OMENTUM, PERCUTANEOUS ENDOSCOPIC APPROACH: ICD-10-PCS | Performed by: COLON & RECTAL SURGERY

## 2021-01-01 PROCEDURE — NC001 PR NO CHARGE: Performed by: PHYSICIAN ASSISTANT

## 2021-01-01 PROCEDURE — 71260 CT THORAX DX C+: CPT

## 2021-01-01 PROCEDURE — 99292 CRITICAL CARE ADDL 30 MIN: CPT | Performed by: PHYSICIAN ASSISTANT

## 2021-01-01 PROCEDURE — 88341 IMHCHEM/IMCYTCHM EA ADD ANTB: CPT | Performed by: PATHOLOGY

## 2021-01-01 PROCEDURE — 36299 UNLISTED PX VASCULAR NJX: CPT | Performed by: RADIOLOGY

## 2021-01-01 PROCEDURE — 99213 OFFICE O/P EST LOW 20 MIN: CPT | Performed by: PHYSICIAN ASSISTANT

## 2021-01-01 PROCEDURE — 3079F DIAST BP 80-89 MM HG: CPT | Performed by: INTERNAL MEDICINE

## 2021-01-01 PROCEDURE — 80048 BASIC METABOLIC PNL TOTAL CA: CPT | Performed by: STUDENT IN AN ORGANIZED HEALTH CARE EDUCATION/TRAINING PROGRAM

## 2021-01-01 PROCEDURE — 36600 WITHDRAWAL OF ARTERIAL BLOOD: CPT

## 2021-01-01 PROCEDURE — 85025 COMPLETE CBC W/AUTO DIFF WBC: CPT | Performed by: EMERGENCY MEDICINE

## 2021-01-01 PROCEDURE — 92950 HEART/LUNG RESUSCITATION CPR: CPT

## 2021-01-01 PROCEDURE — 86920 COMPATIBILITY TEST SPIN: CPT

## 2021-01-01 PROCEDURE — 85025 COMPLETE CBC W/AUTO DIFF WBC: CPT | Performed by: PHYSICIAN ASSISTANT

## 2021-01-01 PROCEDURE — 99495 TRANSJ CARE MGMT MOD F2F 14D: CPT | Performed by: INTERNAL MEDICINE

## 2021-01-01 PROCEDURE — 84443 ASSAY THYROID STIM HORMONE: CPT | Performed by: PHYSICIAN ASSISTANT

## 2021-01-01 PROCEDURE — G0498 CHEMO EXTEND IV INFUS W/PUMP: HCPCS

## 2021-01-01 PROCEDURE — 99285 EMERGENCY DEPT VISIT HI MDM: CPT

## 2021-01-01 PROCEDURE — 83550 IRON BINDING TEST: CPT

## 2021-01-01 PROCEDURE — 0011A SARS-COV-2 / COVID-19 MRNA VACCINE (MODERNA) 100 MCG: CPT

## 2021-01-01 PROCEDURE — 85018 HEMOGLOBIN: CPT | Performed by: PHYSICIAN ASSISTANT

## 2021-01-01 PROCEDURE — 0DTG4ZZ RESECTION OF LEFT LARGE INTESTINE, PERCUTANEOUS ENDOSCOPIC APPROACH: ICD-10-PCS | Performed by: COLON & RECTAL SURGERY

## 2021-01-01 PROCEDURE — 85610 PROTHROMBIN TIME: CPT | Performed by: PHYSICIAN ASSISTANT

## 2021-01-01 PROCEDURE — 80061 LIPID PANEL: CPT

## 2021-01-01 PROCEDURE — 1160F RVW MEDS BY RX/DR IN RCRD: CPT | Performed by: INTERNAL MEDICINE

## 2021-01-01 PROCEDURE — 85025 COMPLETE CBC W/AUTO DIFF WBC: CPT

## 2021-01-01 PROCEDURE — 85025 COMPLETE CBC W/AUTO DIFF WBC: CPT | Performed by: STUDENT IN AN ORGANIZED HEALTH CARE EDUCATION/TRAINING PROGRAM

## 2021-01-01 PROCEDURE — 85027 COMPLETE CBC AUTOMATED: CPT

## 2021-01-01 PROCEDURE — P9037 PLATE PHERES LEUKOREDU IRRAD: HCPCS

## 2021-01-01 PROCEDURE — 1036F TOBACCO NON-USER: CPT | Performed by: INTERNAL MEDICINE

## 2021-01-01 PROCEDURE — C1788 PORT, INDWELLING, IMP: HCPCS

## 2021-01-01 PROCEDURE — 86901 BLOOD TYPING SEROLOGIC RH(D): CPT

## 2021-01-01 PROCEDURE — 80053 COMPREHEN METABOLIC PANEL: CPT | Performed by: PHYSICIAN ASSISTANT

## 2021-01-01 PROCEDURE — 96413 CHEMO IV INFUSION 1 HR: CPT

## 2021-01-01 PROCEDURE — 0WBF0ZZ EXCISION OF ABDOMINAL WALL, OPEN APPROACH: ICD-10-PCS | Performed by: COLON & RECTAL SURGERY

## 2021-01-01 PROCEDURE — 3078F DIAST BP <80 MM HG: CPT | Performed by: INTERNAL MEDICINE

## 2021-01-01 PROCEDURE — 0BH18EZ INSERTION OF ENDOTRACHEAL AIRWAY INTO TRACHEA, VIA NATURAL OR ARTIFICIAL OPENING ENDOSCOPIC: ICD-10-PCS | Performed by: INTERNAL MEDICINE

## 2021-01-01 PROCEDURE — 36415 COLL VENOUS BLD VENIPUNCTURE: CPT

## 2021-01-01 PROCEDURE — C1769 GUIDE WIRE: HCPCS

## 2021-01-01 PROCEDURE — 85027 COMPLETE CBC AUTOMATED: CPT | Performed by: PHYSICIAN ASSISTANT

## 2021-01-01 PROCEDURE — 4A1BXSH MONITORING OF GASTROINTESTINAL VASCULAR PERFUSION USING INDOCYANINE GREEN DYE, EXTERNAL APPROACH: ICD-10-PCS | Performed by: COLON & RECTAL SURGERY

## 2021-01-01 PROCEDURE — 3077F SYST BP >= 140 MM HG: CPT | Performed by: INTERNAL MEDICINE

## 2021-01-01 PROCEDURE — 80048 BASIC METABOLIC PNL TOTAL CA: CPT | Performed by: PHYSICIAN ASSISTANT

## 2021-01-01 PROCEDURE — 85007 BL SMEAR W/DIFF WBC COUNT: CPT | Performed by: PHYSICIAN ASSISTANT

## 2021-01-01 PROCEDURE — 83735 ASSAY OF MAGNESIUM: CPT | Performed by: PHYSICIAN ASSISTANT

## 2021-01-01 PROCEDURE — 76937 US GUIDE VASCULAR ACCESS: CPT

## 2021-01-01 PROCEDURE — 31500 INSERT EMERGENCY AIRWAY: CPT

## 2021-01-01 PROCEDURE — 86901 BLOOD TYPING SEROLOGIC RH(D): CPT | Performed by: PHYSICIAN ASSISTANT

## 2021-01-01 PROCEDURE — 36561 INSERT TUNNELED CV CATH: CPT | Performed by: RADIOLOGY

## 2021-01-01 PROCEDURE — 71045 X-RAY EXAM CHEST 1 VIEW: CPT

## 2021-01-01 PROCEDURE — 88342 IMHCHEM/IMCYTCHM 1ST ANTB: CPT | Performed by: PATHOLOGY

## 2021-01-01 PROCEDURE — 80076 HEPATIC FUNCTION PANEL: CPT | Performed by: PHYSICIAN ASSISTANT

## 2021-01-01 PROCEDURE — 99284 EMERGENCY DEPT VISIT MOD MDM: CPT | Performed by: EMERGENCY MEDICINE

## 2021-01-01 PROCEDURE — 44213 LAP MOBIL SPLENIC FL ADD-ON: CPT | Performed by: COLON & RECTAL SURGERY

## 2021-01-01 PROCEDURE — 83880 ASSAY OF NATRIURETIC PEPTIDE: CPT | Performed by: PHYSICIAN ASSISTANT

## 2021-01-01 PROCEDURE — 99223 1ST HOSP IP/OBS HIGH 75: CPT | Performed by: INTERNAL MEDICINE

## 2021-01-01 PROCEDURE — 85730 THROMBOPLASTIN TIME PARTIAL: CPT | Performed by: PHYSICIAN ASSISTANT

## 2021-01-01 PROCEDURE — 93000 ELECTROCARDIOGRAM COMPLETE: CPT

## 2021-01-01 PROCEDURE — 82378 CARCINOEMBRYONIC ANTIGEN: CPT

## 2021-01-01 PROCEDURE — 88305 TISSUE EXAM BY PATHOLOGIST: CPT | Performed by: PATHOLOGY

## 2021-01-01 PROCEDURE — 99204 OFFICE O/P NEW MOD 45 MIN: CPT | Performed by: INTERNAL MEDICINE

## 2021-01-01 PROCEDURE — 96374 THER/PROPH/DIAG INJ IV PUSH: CPT

## 2021-01-01 PROCEDURE — 86900 BLOOD TYPING SEROLOGIC ABO: CPT | Performed by: PHYSICIAN ASSISTANT

## 2021-01-01 PROCEDURE — 86850 RBC ANTIBODY SCREEN: CPT

## 2021-01-01 PROCEDURE — 77001 FLUOROGUIDE FOR VEIN DEVICE: CPT

## 2021-01-01 PROCEDURE — 86850 RBC ANTIBODY SCREEN: CPT | Performed by: COLON & RECTAL SURGERY

## 2021-01-01 PROCEDURE — 74177 CT ABD & PELVIS W/CONTRAST: CPT

## 2021-01-01 PROCEDURE — 88309 TISSUE EXAM BY PATHOLOGIST: CPT | Performed by: PATHOLOGY

## 2021-01-01 PROCEDURE — 84484 ASSAY OF TROPONIN QUANT: CPT | Performed by: PHYSICIAN ASSISTANT

## 2021-01-01 PROCEDURE — 86900 BLOOD TYPING SEROLOGIC ABO: CPT

## 2021-01-01 PROCEDURE — 80048 BASIC METABOLIC PNL TOTAL CA: CPT

## 2021-01-01 PROCEDURE — 0012A SARS-COV-2 / COVID-19 MRNA VACCINE (MODERNA) 100 MCG: CPT

## 2021-01-01 PROCEDURE — 80053 COMPREHEN METABOLIC PANEL: CPT

## 2021-01-01 PROCEDURE — 86901 BLOOD TYPING SEROLOGIC RH(D): CPT | Performed by: COLON & RECTAL SURGERY

## 2021-01-01 PROCEDURE — 75860 VEIN X-RAY NECK: CPT | Performed by: RADIOLOGY

## 2021-01-01 PROCEDURE — 94760 N-INVAS EAR/PLS OXIMETRY 1: CPT

## 2021-01-01 PROCEDURE — 87086 URINE CULTURE/COLONY COUNT: CPT

## 2021-01-01 PROCEDURE — 94002 VENT MGMT INPAT INIT DAY: CPT

## 2021-01-01 PROCEDURE — 85379 FIBRIN DEGRADATION QUANT: CPT | Performed by: PHYSICIAN ASSISTANT

## 2021-01-01 PROCEDURE — 81001 URINALYSIS AUTO W/SCOPE: CPT | Performed by: INTERNAL MEDICINE

## 2021-01-01 PROCEDURE — 44207 L COLECTOMY/COLOPROCTOSTOMY: CPT | Performed by: COLON & RECTAL SURGERY

## 2021-01-01 PROCEDURE — 99214 OFFICE O/P EST MOD 30 MIN: CPT | Performed by: INTERNAL MEDICINE

## 2021-01-01 PROCEDURE — 99152 MOD SED SAME PHYS/QHP 5/>YRS: CPT

## 2021-01-01 PROCEDURE — 31500 INSERT EMERGENCY AIRWAY: CPT | Performed by: PHYSICIAN ASSISTANT

## 2021-01-01 PROCEDURE — 99205 OFFICE O/P NEW HI 60 MIN: CPT | Performed by: INTERNAL MEDICINE

## 2021-01-01 PROCEDURE — 97162 PT EVAL MOD COMPLEX 30 MIN: CPT

## 2021-01-01 PROCEDURE — 91301 SARS-COV-2 / COVID-19 MRNA VACCINE (MODERNA) 100 MCG: CPT

## 2021-01-01 PROCEDURE — 84100 ASSAY OF PHOSPHORUS: CPT | Performed by: PHYSICIAN ASSISTANT

## 2021-01-01 PROCEDURE — 99284 EMERGENCY DEPT VISIT MOD MDM: CPT

## 2021-01-01 PROCEDURE — 99291 CRITICAL CARE FIRST HOUR: CPT | Performed by: PHYSICIAN ASSISTANT

## 2021-01-01 PROCEDURE — 80048 BASIC METABOLIC PNL TOTAL CA: CPT | Performed by: EMERGENCY MEDICINE

## 2021-01-01 PROCEDURE — 96411 CHEMO IV PUSH ADDL DRUG: CPT

## 2021-01-01 PROCEDURE — NC001 PR NO CHARGE: Performed by: INTERNAL MEDICINE

## 2021-01-01 PROCEDURE — 3075F SYST BP GE 130 - 139MM HG: CPT | Performed by: INTERNAL MEDICINE

## 2021-01-01 PROCEDURE — 82947 ASSAY GLUCOSE BLOOD QUANT: CPT

## 2021-01-01 PROCEDURE — 36415 COLL VENOUS BLD VENIPUNCTURE: CPT | Performed by: EMERGENCY MEDICINE

## 2021-01-01 PROCEDURE — 0DJD8ZZ INSPECTION OF LOWER INTESTINAL TRACT, VIA NATURAL OR ARTIFICIAL OPENING ENDOSCOPIC: ICD-10-PCS | Performed by: COLON & RECTAL SURGERY

## 2021-01-01 PROCEDURE — 96365 THER/PROPH/DIAG IV INF INIT: CPT

## 2021-01-01 PROCEDURE — 97166 OT EVAL MOD COMPLEX 45 MIN: CPT

## 2021-01-01 PROCEDURE — 75827 VEIN X-RAY CHEST: CPT

## 2021-01-01 PROCEDURE — 93010 ELECTROCARDIOGRAM REPORT: CPT | Performed by: INTERNAL MEDICINE

## 2021-01-01 PROCEDURE — 84295 ASSAY OF SERUM SODIUM: CPT

## 2021-01-01 PROCEDURE — C1894 INTRO/SHEATH, NON-LASER: HCPCS

## 2021-01-01 PROCEDURE — 5A1935Z RESPIRATORY VENTILATION, LESS THAN 24 CONSECUTIVE HOURS: ICD-10-PCS | Performed by: INTERNAL MEDICINE

## 2021-01-01 PROCEDURE — 94150 VITAL CAPACITY TEST: CPT

## 2021-01-01 PROCEDURE — 81210 BRAF GENE: CPT | Performed by: PATHOLOGY

## 2021-01-01 PROCEDURE — 85014 HEMATOCRIT: CPT

## 2021-01-01 PROCEDURE — G1004 CDSM NDSC: HCPCS

## 2021-01-01 PROCEDURE — P9016 RBC LEUKOCYTES REDUCED: HCPCS

## 2021-01-01 PROCEDURE — 82728 ASSAY OF FERRITIN: CPT

## 2021-01-01 PROCEDURE — 85730 THROMBOPLASTIN TIME PARTIAL: CPT | Performed by: INTERNAL MEDICINE

## 2021-01-01 PROCEDURE — S9088 SERVICES PROVIDED IN URGENT: HCPCS | Performed by: PHYSICIAN ASSISTANT

## 2021-01-01 PROCEDURE — 49255 REMOVAL OF OMENTUM: CPT | Performed by: COLON & RECTAL SURGERY

## 2021-01-01 PROCEDURE — 96367 TX/PROPH/DG ADDL SEQ IV INF: CPT

## 2021-01-01 PROCEDURE — 82330 ASSAY OF CALCIUM: CPT | Performed by: PHYSICIAN ASSISTANT

## 2021-01-01 PROCEDURE — 36561 INSERT TUNNELED CV CATH: CPT

## 2021-01-01 PROCEDURE — 84132 ASSAY OF SERUM POTASSIUM: CPT

## 2021-01-01 PROCEDURE — 76937 US GUIDE VASCULAR ACCESS: CPT | Performed by: RADIOLOGY

## 2021-01-01 PROCEDURE — 83540 ASSAY OF IRON: CPT

## 2021-01-01 PROCEDURE — 85025 COMPLETE CBC W/AUTO DIFF WBC: CPT | Performed by: NURSE PRACTITIONER

## 2021-01-01 PROCEDURE — 93005 ELECTROCARDIOGRAM TRACING: CPT

## 2021-01-01 PROCEDURE — 99153 MOD SED SAME PHYS/QHP EA: CPT

## 2021-01-01 PROCEDURE — 45330 DIAGNOSTIC SIGMOIDOSCOPY: CPT | Performed by: COLON & RECTAL SURGERY

## 2021-01-01 PROCEDURE — 96361 HYDRATE IV INFUSION ADD-ON: CPT

## 2021-01-01 PROCEDURE — 96368 THER/DIAG CONCURRENT INF: CPT

## 2021-01-01 PROCEDURE — 99152 MOD SED SAME PHYS/QHP 5/>YRS: CPT | Performed by: RADIOLOGY

## 2021-01-01 PROCEDURE — 1111F DSCHRG MED/CURRENT MED MERGE: CPT | Performed by: INTERNAL MEDICINE

## 2021-01-01 PROCEDURE — 86900 BLOOD TYPING SEROLOGIC ABO: CPT | Performed by: COLON & RECTAL SURGERY

## 2021-01-01 PROCEDURE — 77001 FLUOROGUIDE FOR VEIN DEVICE: CPT | Performed by: RADIOLOGY

## 2021-01-01 PROCEDURE — 86850 RBC ANTIBODY SCREEN: CPT | Performed by: PHYSICIAN ASSISTANT

## 2021-01-01 PROCEDURE — 83605 ASSAY OF LACTIC ACID: CPT | Performed by: PHYSICIAN ASSISTANT

## 2021-01-01 PROCEDURE — 45380 COLONOSCOPY AND BIOPSY: CPT | Performed by: INTERNAL MEDICINE

## 2021-01-01 PROCEDURE — 45385 COLONOSCOPY W/LESION REMOVAL: CPT | Performed by: INTERNAL MEDICINE

## 2021-01-01 PROCEDURE — 82803 BLOOD GASES ANY COMBINATION: CPT

## 2021-01-01 PROCEDURE — 96415 CHEMO IV INFUSION ADDL HR: CPT

## 2021-01-01 RX ORDER — HYDROMORPHONE HYDROCHLORIDE 4 MG/ML
INJECTION, SOLUTION INTRAMUSCULAR; INTRAVENOUS; SUBCUTANEOUS CODE/TRAUMA/SEDATION MEDICATION
Status: COMPLETED | OUTPATIENT
Start: 2021-01-01 | End: 2021-01-01

## 2021-01-01 RX ORDER — MONTELUKAST SODIUM 10 MG/1
10 TABLET ORAL
Status: DISCONTINUED | OUTPATIENT
Start: 2021-01-01 | End: 2021-01-01 | Stop reason: HOSPADM

## 2021-01-01 RX ORDER — CEFAZOLIN SODIUM 1 G/3ML
INJECTION, POWDER, FOR SOLUTION INTRAMUSCULAR; INTRAVENOUS CODE/TRAUMA/SEDATION MEDICATION
Status: COMPLETED | OUTPATIENT
Start: 2021-01-01 | End: 2021-01-01

## 2021-01-01 RX ORDER — ONDANSETRON 2 MG/ML
4 INJECTION INTRAMUSCULAR; INTRAVENOUS ONCE AS NEEDED
Status: DISCONTINUED | OUTPATIENT
Start: 2021-01-01 | End: 2021-01-01 | Stop reason: HOSPADM

## 2021-01-01 RX ORDER — ATORVASTATIN CALCIUM 40 MG/1
40 TABLET, FILM COATED ORAL EVERY EVENING
Status: DISCONTINUED | OUTPATIENT
Start: 2021-01-01 | End: 2021-01-01 | Stop reason: HOSPADM

## 2021-01-01 RX ORDER — MAGNESIUM HYDROXIDE 1200 MG/15ML
LIQUID ORAL AS NEEDED
Status: DISCONTINUED | OUTPATIENT
Start: 2021-01-01 | End: 2021-01-01 | Stop reason: HOSPADM

## 2021-01-01 RX ORDER — ONDANSETRON 2 MG/ML
INJECTION INTRAMUSCULAR; INTRAVENOUS AS NEEDED
Status: DISCONTINUED | OUTPATIENT
Start: 2021-01-01 | End: 2021-01-01

## 2021-01-01 RX ORDER — SODIUM CHLORIDE, SODIUM GLUCONATE, SODIUM ACETATE, POTASSIUM CHLORIDE, MAGNESIUM CHLORIDE, SODIUM PHOSPHATE, DIBASIC, AND POTASSIUM PHOSPHATE .53; .5; .37; .037; .03; .012; .00082 G/100ML; G/100ML; G/100ML; G/100ML; G/100ML; G/100ML; G/100ML
1000 INJECTION, SOLUTION INTRAVENOUS ONCE
Status: COMPLETED | OUTPATIENT
Start: 2021-01-01 | End: 2021-01-01

## 2021-01-01 RX ORDER — CALCIUM CHLORIDE 100 MG/ML
SYRINGE (ML) INTRAVENOUS CODE/TRAUMA/SEDATION MEDICATION
Status: COMPLETED | OUTPATIENT
Start: 2021-01-01 | End: 2021-01-01

## 2021-01-01 RX ORDER — SODIUM CHLORIDE, SODIUM GLUCONATE, SODIUM ACETATE, POTASSIUM CHLORIDE, MAGNESIUM CHLORIDE, SODIUM PHOSPHATE, DIBASIC, AND POTASSIUM PHOSPHATE .53; .5; .37; .037; .03; .012; .00082 G/100ML; G/100ML; G/100ML; G/100ML; G/100ML; G/100ML; G/100ML
1000 INJECTION, SOLUTION INTRAVENOUS ONCE
Status: DISCONTINUED | OUTPATIENT
Start: 2021-01-01 | End: 2021-01-01 | Stop reason: HOSPADM

## 2021-01-01 RX ORDER — FLUOROURACIL 50 MG/ML
400 INJECTION, SOLUTION INTRAVENOUS ONCE
Status: CANCELLED | OUTPATIENT
Start: 2021-01-01

## 2021-01-01 RX ORDER — ACETAMINOPHEN 325 MG/1
650 TABLET ORAL EVERY 8 HOURS SCHEDULED
Status: DISCONTINUED | OUTPATIENT
Start: 2021-01-01 | End: 2021-01-01 | Stop reason: HOSPADM

## 2021-01-01 RX ORDER — POLYETHYLENE GLYCOL 3350 17 G/17G
255 POWDER, FOR SOLUTION ORAL ONCE
Status: DISCONTINUED | OUTPATIENT
Start: 2021-01-01 | End: 2021-01-01

## 2021-01-01 RX ORDER — HEPARIN SODIUM 5000 [USP'U]/ML
5000 INJECTION, SOLUTION INTRAVENOUS; SUBCUTANEOUS ONCE
Status: COMPLETED | OUTPATIENT
Start: 2021-01-01 | End: 2021-01-01

## 2021-01-01 RX ORDER — POTASSIUM CHLORIDE 1500 MG/1
TABLET, EXTENDED RELEASE ORAL
Qty: 180 TABLET | Refills: 1 | Status: SHIPPED | OUTPATIENT
Start: 2021-01-01 | End: 2021-01-01 | Stop reason: HOSPADM

## 2021-01-01 RX ORDER — SODIUM CHLORIDE, SODIUM GLUCONATE, SODIUM ACETATE, POTASSIUM CHLORIDE, MAGNESIUM CHLORIDE, SODIUM PHOSPHATE, DIBASIC, AND POTASSIUM PHOSPHATE .53; .5; .37; .037; .03; .012; .00082 G/100ML; G/100ML; G/100ML; G/100ML; G/100ML; G/100ML; G/100ML
100 INJECTION, SOLUTION INTRAVENOUS CONTINUOUS
Status: DISCONTINUED | OUTPATIENT
Start: 2021-01-01 | End: 2021-01-01

## 2021-01-01 RX ORDER — SODIUM CHLORIDE, SODIUM LACTATE, POTASSIUM CHLORIDE, CALCIUM CHLORIDE 600; 310; 30; 20 MG/100ML; MG/100ML; MG/100ML; MG/100ML
INJECTION, SOLUTION INTRAVENOUS CONTINUOUS PRN
Status: DISCONTINUED | OUTPATIENT
Start: 2021-01-01 | End: 2021-01-01

## 2021-01-01 RX ORDER — HEPARIN SODIUM 5000 [USP'U]/ML
5000 INJECTION, SOLUTION INTRAVENOUS; SUBCUTANEOUS EVERY 8 HOURS SCHEDULED
Status: DISCONTINUED | OUTPATIENT
Start: 2021-01-01 | End: 2021-01-01 | Stop reason: HOSPADM

## 2021-01-01 RX ORDER — VANCOMYCIN HYDROCHLORIDE 1 G/200ML
1000 INJECTION, SOLUTION INTRAVENOUS EVERY 24 HOURS
Status: DISCONTINUED | OUTPATIENT
Start: 2021-01-01 | End: 2021-01-01 | Stop reason: DRUGHIGH

## 2021-01-01 RX ORDER — PROPOFOL 10 MG/ML
INJECTION, EMULSION INTRAVENOUS AS NEEDED
Status: DISCONTINUED | OUTPATIENT
Start: 2021-01-01 | End: 2021-01-01

## 2021-01-01 RX ORDER — DIAZEPAM 5 MG/ML
INJECTION, SOLUTION INTRAMUSCULAR; INTRAVENOUS CODE/TRAUMA/SEDATION MEDICATION
Status: COMPLETED | OUTPATIENT
Start: 2021-01-01 | End: 2021-01-01

## 2021-01-01 RX ORDER — ONDANSETRON 2 MG/ML
4 INJECTION INTRAMUSCULAR; INTRAVENOUS EVERY 6 HOURS PRN
Status: DISCONTINUED | OUTPATIENT
Start: 2021-01-01 | End: 2021-01-01 | Stop reason: HOSPADM

## 2021-01-01 RX ORDER — SODIUM CHLORIDE, SODIUM LACTATE, POTASSIUM CHLORIDE, CALCIUM CHLORIDE 600; 310; 30; 20 MG/100ML; MG/100ML; MG/100ML; MG/100ML
125 INJECTION, SOLUTION INTRAVENOUS CONTINUOUS
Status: DISCONTINUED | OUTPATIENT
Start: 2021-01-01 | End: 2021-01-01

## 2021-01-01 RX ORDER — FLUTICASONE PROPIONATE 50 MCG
SPRAY, SUSPENSION (ML) NASAL
Qty: 48 ML | Refills: 1 | Status: SHIPPED | OUTPATIENT
Start: 2021-01-01 | End: 2021-01-01 | Stop reason: HOSPADM

## 2021-01-01 RX ORDER — ROCURONIUM BROMIDE 10 MG/ML
INJECTION, SOLUTION INTRAVENOUS AS NEEDED
Status: DISCONTINUED | OUTPATIENT
Start: 2021-01-01 | End: 2021-01-01

## 2021-01-01 RX ORDER — ACETAMINOPHEN 325 MG/1
975 TABLET ORAL ONCE
Status: COMPLETED | OUTPATIENT
Start: 2021-01-01 | End: 2021-01-01

## 2021-01-01 RX ORDER — PROPOFOL 10 MG/ML
INJECTION, EMULSION INTRAVENOUS CONTINUOUS PRN
Status: DISCONTINUED | OUTPATIENT
Start: 2021-01-01 | End: 2021-01-01

## 2021-01-01 RX ORDER — SODIUM CHLORIDE 9 MG/ML
75 INJECTION, SOLUTION INTRAVENOUS CONTINUOUS
Status: DISCONTINUED | OUTPATIENT
Start: 2021-01-01 | End: 2021-01-01 | Stop reason: HOSPADM

## 2021-01-01 RX ORDER — PROCHLORPERAZINE MALEATE 10 MG
10 TABLET ORAL EVERY 6 HOURS PRN
Qty: 45 TABLET | Refills: 3 | Status: SHIPPED | OUTPATIENT
Start: 2021-01-01 | End: 2021-01-01 | Stop reason: HOSPADM

## 2021-01-01 RX ORDER — DEXTROSE MONOHYDRATE 50 MG/ML
20 INJECTION, SOLUTION INTRAVENOUS ONCE
Status: COMPLETED | OUTPATIENT
Start: 2021-01-01 | End: 2021-01-01

## 2021-01-01 RX ORDER — FENTANYL CITRATE/PF 50 MCG/ML
25 SYRINGE (ML) INJECTION
Status: DISCONTINUED | OUTPATIENT
Start: 2021-01-01 | End: 2021-01-01 | Stop reason: HOSPADM

## 2021-01-01 RX ORDER — SODIUM BICARBONATE 84 MG/ML
INJECTION, SOLUTION INTRAVENOUS CODE/TRAUMA/SEDATION MEDICATION
Status: COMPLETED | OUTPATIENT
Start: 2021-01-01 | End: 2021-01-01

## 2021-01-01 RX ORDER — SODIUM CHLORIDE 9 MG/ML
20 INJECTION, SOLUTION INTRAVENOUS ONCE
Status: CANCELLED | OUTPATIENT
Start: 2021-01-01

## 2021-01-01 RX ORDER — AMOXICILLIN AND CLAVULANATE POTASSIUM 875; 125 MG/1; MG/1
1 TABLET, FILM COATED ORAL EVERY 12 HOURS SCHEDULED
Qty: 14 TABLET | Refills: 0 | Status: SHIPPED | OUTPATIENT
Start: 2021-01-01 | End: 2021-01-01 | Stop reason: SDUPTHER

## 2021-01-01 RX ORDER — POTASSIUM CHLORIDE 20 MEQ/1
20 TABLET, EXTENDED RELEASE ORAL ONCE
Status: COMPLETED | OUTPATIENT
Start: 2021-01-01 | End: 2021-01-01

## 2021-01-01 RX ORDER — ACETAMINOPHEN 325 MG/1
650 TABLET ORAL EVERY 6 HOURS PRN
Status: DISCONTINUED | OUTPATIENT
Start: 2021-01-01 | End: 2021-01-01

## 2021-01-01 RX ORDER — CHLORHEXIDINE GLUCONATE 0.12 MG/ML
15 RINSE ORAL EVERY 12 HOURS SCHEDULED
Status: DISCONTINUED | OUTPATIENT
Start: 2021-01-01 | End: 2021-01-01 | Stop reason: SDUPTHER

## 2021-01-01 RX ORDER — EPINEPHRINE 0.1 MG/ML
SYRINGE (ML) INJECTION CODE/TRAUMA/SEDATION MEDICATION
Status: COMPLETED | OUTPATIENT
Start: 2021-01-01 | End: 2021-01-01

## 2021-01-01 RX ORDER — ONDANSETRON 2 MG/ML
4 INJECTION INTRAMUSCULAR; INTRAVENOUS ONCE
Status: COMPLETED | OUTPATIENT
Start: 2021-01-01 | End: 2021-01-01

## 2021-01-01 RX ORDER — B-COMPLEX WITH VITAMIN C
1 TABLET ORAL DAILY
Status: DISCONTINUED | OUTPATIENT
Start: 2021-01-01 | End: 2021-01-01 | Stop reason: HOSPADM

## 2021-01-01 RX ORDER — HEPARIN SODIUM 10000 [USP'U]/100ML
3-30 INJECTION, SOLUTION INTRAVENOUS
Status: DISCONTINUED | OUTPATIENT
Start: 2021-01-01 | End: 2021-01-01

## 2021-01-01 RX ORDER — PANTOPRAZOLE SODIUM 40 MG/1
40 INJECTION, POWDER, FOR SOLUTION INTRAVENOUS
Status: DISCONTINUED | OUTPATIENT
Start: 2021-01-01 | End: 2021-01-01

## 2021-01-01 RX ORDER — CHLORHEXIDINE GLUCONATE 0.12 MG/ML
15 RINSE ORAL EVERY 12 HOURS SCHEDULED
Status: DISCONTINUED | OUTPATIENT
Start: 2021-01-01 | End: 2021-01-01 | Stop reason: HOSPADM

## 2021-01-01 RX ORDER — ALBUMIN, HUMAN INJ 5% 5 %
SOLUTION INTRAVENOUS CONTINUOUS PRN
Status: DISCONTINUED | OUTPATIENT
Start: 2021-01-01 | End: 2021-01-01

## 2021-01-01 RX ORDER — FENTANYL CITRATE 50 UG/ML
INJECTION, SOLUTION INTRAMUSCULAR; INTRAVENOUS AS NEEDED
Status: DISCONTINUED | OUTPATIENT
Start: 2021-01-01 | End: 2021-01-01

## 2021-01-01 RX ORDER — LABETALOL 20 MG/4 ML (5 MG/ML) INTRAVENOUS SYRINGE
AS NEEDED
Status: DISCONTINUED | OUTPATIENT
Start: 2021-01-01 | End: 2021-01-01

## 2021-01-01 RX ORDER — DEXTROSE MONOHYDRATE 50 MG/ML
20 INJECTION, SOLUTION INTRAVENOUS ONCE
Status: CANCELLED | OUTPATIENT
Start: 2021-01-01

## 2021-01-01 RX ORDER — AMOXICILLIN AND CLAVULANATE POTASSIUM 875; 125 MG/1; MG/1
1 TABLET, FILM COATED ORAL ONCE
Status: COMPLETED | OUTPATIENT
Start: 2021-01-01 | End: 2021-01-01

## 2021-01-01 RX ORDER — FLUTICASONE PROPIONATE 50 MCG
1 SPRAY, SUSPENSION (ML) NASAL DAILY PRN
Status: DISCONTINUED | OUTPATIENT
Start: 2021-01-01 | End: 2021-01-01 | Stop reason: HOSPADM

## 2021-01-01 RX ORDER — DEXTROSE, SODIUM CHLORIDE, AND POTASSIUM CHLORIDE 5; .45; .15 G/100ML; G/100ML; G/100ML
84 INJECTION INTRAVENOUS CONTINUOUS
Status: DISCONTINUED | OUTPATIENT
Start: 2021-01-01 | End: 2021-01-01

## 2021-01-01 RX ORDER — CEFEPIME HYDROCHLORIDE 1 G/1
2000 INJECTION, POWDER, FOR SOLUTION INTRAMUSCULAR; INTRAVENOUS EVERY 24 HOURS
Status: DISCONTINUED | OUTPATIENT
Start: 2021-01-01 | End: 2021-01-01 | Stop reason: HOSPADM

## 2021-01-01 RX ORDER — PANTOPRAZOLE SODIUM 40 MG/1
40 INJECTION, POWDER, FOR SOLUTION INTRAVENOUS
Status: DISCONTINUED | OUTPATIENT
Start: 2021-01-01 | End: 2021-01-01 | Stop reason: HOSPADM

## 2021-01-01 RX ORDER — IPRATROPIUM BROMIDE AND ALBUTEROL SULFATE 2.5; .5 MG/3ML; MG/3ML
3 SOLUTION RESPIRATORY (INHALATION) EVERY 4 HOURS PRN
Status: DISCONTINUED | OUTPATIENT
Start: 2021-01-01 | End: 2021-01-01

## 2021-01-01 RX ORDER — SODIUM CHLORIDE 9 MG/ML
20 INJECTION, SOLUTION INTRAVENOUS ONCE
Status: COMPLETED | OUTPATIENT
Start: 2021-01-01 | End: 2021-01-01

## 2021-01-01 RX ORDER — SODIUM CHLORIDE, SODIUM LACTATE, POTASSIUM CHLORIDE, CALCIUM CHLORIDE 600; 310; 30; 20 MG/100ML; MG/100ML; MG/100ML; MG/100ML
100 INJECTION, SOLUTION INTRAVENOUS CONTINUOUS
Status: DISCONTINUED | OUTPATIENT
Start: 2021-01-01 | End: 2021-01-01

## 2021-01-01 RX ORDER — HYDROMORPHONE HCL IN WATER/PF 6 MG/30 ML
0.2 PATIENT CONTROLLED ANALGESIA SYRINGE INTRAVENOUS
Status: DISCONTINUED | OUTPATIENT
Start: 2021-01-01 | End: 2021-01-01 | Stop reason: HOSPADM

## 2021-01-01 RX ORDER — FLUTICASONE FUROATE AND VILANTEROL 100; 25 UG/1; UG/1
1 POWDER RESPIRATORY (INHALATION)
Status: DISCONTINUED | OUTPATIENT
Start: 2021-01-01 | End: 2021-01-01 | Stop reason: HOSPADM

## 2021-01-01 RX ORDER — CEFAZOLIN SODIUM 1 G/50ML
1000 SOLUTION INTRAVENOUS ONCE
Status: DISCONTINUED | OUTPATIENT
Start: 2021-01-01 | End: 2021-01-01 | Stop reason: HOSPADM

## 2021-01-01 RX ORDER — MONTELUKAST SODIUM 10 MG/1
TABLET ORAL
Qty: 90 TABLET | Refills: 3 | Status: SHIPPED | OUTPATIENT
Start: 2021-01-01 | End: 2021-01-01 | Stop reason: HOSPADM

## 2021-01-01 RX ORDER — AMIODARONE HYDROCHLORIDE 50 MG/ML
INJECTION, SOLUTION INTRAVENOUS CODE/TRAUMA/SEDATION MEDICATION
Status: COMPLETED | OUTPATIENT
Start: 2021-01-01 | End: 2021-01-01

## 2021-01-01 RX ORDER — CHLORAL HYDRATE 500 MG
1000 CAPSULE ORAL DAILY
Status: DISCONTINUED | OUTPATIENT
Start: 2021-01-01 | End: 2021-01-01 | Stop reason: HOSPADM

## 2021-01-01 RX ORDER — HEPARIN SODIUM 1000 [USP'U]/ML
4800 INJECTION, SOLUTION INTRAVENOUS; SUBCUTANEOUS
Status: DISCONTINUED | OUTPATIENT
Start: 2021-01-01 | End: 2021-01-01

## 2021-01-01 RX ORDER — CEFAZOLIN SODIUM 1 G/50ML
1000 SOLUTION INTRAVENOUS ONCE
Status: CANCELLED | OUTPATIENT
Start: 2021-01-01 | End: 2021-01-01

## 2021-01-01 RX ORDER — AMLODIPINE BESYLATE 5 MG/1
TABLET ORAL
Qty: 90 TABLET | Refills: 1 | Status: SHIPPED | OUTPATIENT
Start: 2021-01-01 | End: 2021-01-01

## 2021-01-01 RX ORDER — ALBUMIN, HUMAN INJ 5% 5 %
25 SOLUTION INTRAVENOUS ONCE
Status: COMPLETED | OUTPATIENT
Start: 2021-01-01 | End: 2021-01-01

## 2021-01-01 RX ORDER — CEFAZOLIN SODIUM 1 G/50ML
1000 SOLUTION INTRAVENOUS ONCE
Status: COMPLETED | OUTPATIENT
Start: 2021-01-01 | End: 2021-01-01

## 2021-01-01 RX ORDER — POTASSIUM CHLORIDE 20 MEQ/1
40 TABLET, EXTENDED RELEASE ORAL ONCE
Status: COMPLETED | OUTPATIENT
Start: 2021-01-01 | End: 2021-01-01

## 2021-01-01 RX ORDER — POLYETHYLENE GLYCOL 3350 17 G/17G
POWDER, FOR SOLUTION ORAL
Qty: 238 G | Refills: 0 | Status: SHIPPED | OUTPATIENT
Start: 2021-01-01 | End: 2021-01-01 | Stop reason: ALTCHOICE

## 2021-01-01 RX ORDER — HYDROMORPHONE HCL/PF 1 MG/ML
SYRINGE (ML) INJECTION AS NEEDED
Status: DISCONTINUED | OUTPATIENT
Start: 2021-01-01 | End: 2021-01-01

## 2021-01-01 RX ORDER — SODIUM CHLORIDE 9 MG/ML
INJECTION, SOLUTION INTRAVENOUS CONTINUOUS PRN
Status: DISCONTINUED | OUTPATIENT
Start: 2021-01-01 | End: 2021-01-01

## 2021-01-01 RX ORDER — CALCIUM GLUCONATE 20 MG/ML
2 INJECTION, SOLUTION INTRAVENOUS ONCE
Status: COMPLETED | OUTPATIENT
Start: 2021-01-01 | End: 2021-01-01

## 2021-01-01 RX ORDER — DEXAMETHASONE SODIUM PHOSPHATE 4 MG/ML
INJECTION, SOLUTION INTRA-ARTICULAR; INTRALESIONAL; INTRAMUSCULAR; INTRAVENOUS; SOFT TISSUE AS NEEDED
Status: DISCONTINUED | OUTPATIENT
Start: 2021-01-01 | End: 2021-01-01

## 2021-01-01 RX ORDER — SUCCINYLCHOLINE/SOD CL,ISO/PF 100 MG/5ML
SYRINGE (ML) INTRAVENOUS AS NEEDED
Status: DISCONTINUED | OUTPATIENT
Start: 2021-01-01 | End: 2021-01-01

## 2021-01-01 RX ORDER — LIDOCAINE HYDROCHLORIDE 10 MG/ML
0.5 INJECTION, SOLUTION EPIDURAL; INFILTRATION; INTRACAUDAL; PERINEURAL ONCE AS NEEDED
Status: COMPLETED | OUTPATIENT
Start: 2021-01-01 | End: 2021-01-01

## 2021-01-01 RX ORDER — OXYCODONE HYDROCHLORIDE 5 MG/1
5 TABLET ORAL EVERY 4 HOURS PRN
Status: DISCONTINUED | OUTPATIENT
Start: 2021-01-01 | End: 2021-01-01 | Stop reason: HOSPADM

## 2021-01-01 RX ORDER — HEPARIN SODIUM 1000 [USP'U]/ML
4800 INJECTION, SOLUTION INTRAVENOUS; SUBCUTANEOUS ONCE
Status: COMPLETED | OUTPATIENT
Start: 2021-01-01 | End: 2021-01-01

## 2021-01-01 RX ORDER — SODIUM CHLORIDE 9 MG/ML
20 INJECTION, SOLUTION INTRAVENOUS ONCE AS NEEDED
Status: DISCONTINUED | OUTPATIENT
Start: 2021-01-01 | End: 2021-01-01 | Stop reason: HOSPADM

## 2021-01-01 RX ORDER — FENTANYL CITRATE 50 UG/ML
INJECTION, SOLUTION INTRAMUSCULAR; INTRAVENOUS CODE/TRAUMA/SEDATION MEDICATION
Status: COMPLETED | OUTPATIENT
Start: 2021-01-01 | End: 2021-01-01

## 2021-01-01 RX ORDER — FLUOROURACIL 50 MG/ML
400 INJECTION, SOLUTION INTRAVENOUS ONCE
Status: COMPLETED | OUTPATIENT
Start: 2021-01-01 | End: 2021-01-01

## 2021-01-01 RX ORDER — HYDROCHLOROTHIAZIDE 25 MG/1
TABLET ORAL
Qty: 90 TABLET | Refills: 1 | Status: SHIPPED | OUTPATIENT
Start: 2021-01-01 | End: 2021-01-01 | Stop reason: HOSPADM

## 2021-01-01 RX ORDER — ACETAMINOPHEN 325 MG/1
975 TABLET ORAL EVERY 8 HOURS SCHEDULED
Status: DISCONTINUED | OUTPATIENT
Start: 2021-01-01 | End: 2021-01-01 | Stop reason: HOSPADM

## 2021-01-01 RX ORDER — LOSARTAN POTASSIUM 100 MG/1
TABLET ORAL
Qty: 90 TABLET | Refills: 1 | Status: SHIPPED | OUTPATIENT
Start: 2021-01-01 | End: 2021-01-01

## 2021-01-01 RX ORDER — FENTANYL CITRATE-0.9 % NACL/PF 10 MCG/ML
100 PLASTIC BAG, INJECTION (ML) INTRAVENOUS CONTINUOUS
Status: DISCONTINUED | OUTPATIENT
Start: 2021-01-01 | End: 2021-01-01 | Stop reason: HOSPADM

## 2021-01-01 RX ORDER — ATORVASTATIN CALCIUM 40 MG/1
40 TABLET, FILM COATED ORAL
Status: DISCONTINUED | OUTPATIENT
Start: 2021-01-01 | End: 2021-01-01 | Stop reason: HOSPADM

## 2021-01-01 RX ORDER — NEOSTIGMINE METHYLSULFATE 1 MG/ML
INJECTION INTRAVENOUS AS NEEDED
Status: DISCONTINUED | OUTPATIENT
Start: 2021-01-01 | End: 2021-01-01

## 2021-01-01 RX ORDER — LIDOCAINE HYDROCHLORIDE 10 MG/ML
INJECTION, SOLUTION EPIDURAL; INFILTRATION; INTRACAUDAL; PERINEURAL AS NEEDED
Status: DISCONTINUED | OUTPATIENT
Start: 2021-01-01 | End: 2021-01-01

## 2021-01-01 RX ORDER — MIDAZOLAM HYDROCHLORIDE 2 MG/2ML
INJECTION, SOLUTION INTRAMUSCULAR; INTRAVENOUS CODE/TRAUMA/SEDATION MEDICATION
Status: COMPLETED | OUTPATIENT
Start: 2021-01-01 | End: 2021-01-01

## 2021-01-01 RX ORDER — DICYCLOMINE HCL 20 MG
20 TABLET ORAL ONCE
Status: COMPLETED | OUTPATIENT
Start: 2021-01-01 | End: 2021-01-01

## 2021-01-01 RX ORDER — GLYCOPYRROLATE 0.2 MG/ML
INJECTION INTRAMUSCULAR; INTRAVENOUS AS NEEDED
Status: DISCONTINUED | OUTPATIENT
Start: 2021-01-01 | End: 2021-01-01

## 2021-01-01 RX ORDER — SODIUM CHLORIDE 9 MG/ML
20 INJECTION, SOLUTION INTRAVENOUS ONCE AS NEEDED
Status: CANCELLED | OUTPATIENT
Start: 2021-01-01

## 2021-01-01 RX ORDER — AMOXICILLIN AND CLAVULANATE POTASSIUM 875; 125 MG/1; MG/1
1 TABLET, FILM COATED ORAL EVERY 12 HOURS SCHEDULED
Qty: 14 TABLET | Refills: 0 | Status: SHIPPED | OUTPATIENT
Start: 2021-01-01 | End: 2021-01-01

## 2021-01-01 RX ORDER — OXYCODONE HYDROCHLORIDE 5 MG/1
2.5 TABLET ORAL EVERY 4 HOURS PRN
Status: DISCONTINUED | OUTPATIENT
Start: 2021-01-01 | End: 2021-01-01 | Stop reason: HOSPADM

## 2021-01-01 RX ORDER — AMLODIPINE BESYLATE 2.5 MG/1
TABLET ORAL
Qty: 90 TABLET | Refills: 2 | Status: SHIPPED | OUTPATIENT
Start: 2021-01-01 | End: 2021-01-01 | Stop reason: HOSPADM

## 2021-01-01 RX ORDER — HEPARIN SODIUM 1000 [USP'U]/ML
2400 INJECTION, SOLUTION INTRAVENOUS; SUBCUTANEOUS
Status: DISCONTINUED | OUTPATIENT
Start: 2021-01-01 | End: 2021-01-01

## 2021-01-01 RX ORDER — AMLODIPINE BESYLATE 5 MG/1
TABLET ORAL
Qty: 90 TABLET | Refills: 1 | Status: SHIPPED | OUTPATIENT
Start: 2021-01-01 | End: 2021-01-01 | Stop reason: HOSPADM

## 2021-01-01 RX ORDER — AMOXICILLIN 500 MG
CAPSULE ORAL DAILY
COMMUNITY
End: 2021-01-01 | Stop reason: HOSPADM

## 2021-01-01 RX ORDER — VANCOMYCIN HYDROCHLORIDE 1 G/200ML
15 INJECTION, SOLUTION INTRAVENOUS DAILY PRN
Status: DISCONTINUED | OUTPATIENT
Start: 2021-07-25 | End: 2021-01-01 | Stop reason: HOSPADM

## 2021-01-01 RX ORDER — LOSARTAN POTASSIUM 100 MG/1
TABLET ORAL
Qty: 90 TABLET | Refills: 1 | Status: SHIPPED | OUTPATIENT
Start: 2021-01-01 | End: 2021-01-01 | Stop reason: HOSPADM

## 2021-01-01 RX ORDER — FLUTICASONE PROPIONATE 50 MCG
1 SPRAY, SUSPENSION (ML) NASAL DAILY
Status: DISCONTINUED | OUTPATIENT
Start: 2021-01-01 | End: 2021-01-01 | Stop reason: HOSPADM

## 2021-01-01 RX ORDER — SODIUM CHLORIDE 9 MG/ML
75 INJECTION, SOLUTION INTRAVENOUS CONTINUOUS
Status: CANCELLED | OUTPATIENT
Start: 2021-01-01

## 2021-01-01 RX ORDER — HYDROCHLOROTHIAZIDE 25 MG/1
TABLET ORAL
Qty: 90 TABLET | Refills: 1 | Status: SHIPPED | OUTPATIENT
Start: 2021-01-01 | End: 2021-01-01

## 2021-01-01 RX ORDER — DEXTROSE AND SODIUM CHLORIDE 5; .45 G/100ML; G/100ML
100 INJECTION, SOLUTION INTRAVENOUS CONTINUOUS
Status: DISCONTINUED | OUTPATIENT
Start: 2021-01-01 | End: 2021-01-01

## 2021-01-01 RX ORDER — SODIUM CHLORIDE 9 MG/ML
50 INJECTION, SOLUTION INTRAVENOUS CONTINUOUS
Status: DISCONTINUED | OUTPATIENT
Start: 2021-01-01 | End: 2021-01-01 | Stop reason: HOSPADM

## 2021-01-01 RX ADMIN — CALCIUM GLUCONATE 2 G: 20 INJECTION, SOLUTION INTRAVENOUS at 04:43

## 2021-01-01 RX ADMIN — FENTANYL CITRATE 50 MCG: 50 INJECTION INTRAMUSCULAR; INTRAVENOUS at 10:55

## 2021-01-01 RX ADMIN — LIDOCAINE HYDROCHLORIDE 50 MG: 10 INJECTION, SOLUTION EPIDURAL; INFILTRATION; INTRACAUDAL at 08:38

## 2021-01-01 RX ADMIN — DICYCLOMINE HYDROCHLORIDE 20 MG: 20 TABLET ORAL at 16:33

## 2021-01-01 RX ADMIN — ACETAMINOPHEN 975 MG: 325 TABLET, FILM COATED ORAL at 13:19

## 2021-01-01 RX ADMIN — HEPARIN SODIUM 5000 UNITS: 5000 INJECTION INTRAVENOUS; SUBCUTANEOUS at 21:06

## 2021-01-01 RX ADMIN — ROCURONIUM BROMIDE 20 MG: 50 INJECTION, SOLUTION INTRAVENOUS at 15:01

## 2021-01-01 RX ADMIN — CALCIUM CHLORIDE 1 G: 100 INJECTION PARENTERAL at 00:52

## 2021-01-01 RX ADMIN — EPINEPHRINE 5 MCG/MIN: 1 INJECTION, SOLUTION, CONCENTRATE INTRAVENOUS at 01:45

## 2021-01-01 RX ADMIN — Medication: at 22:23

## 2021-01-01 RX ADMIN — NOREPINEPHRINE BITARTRATE 2 MCG/MIN: 1 INJECTION, SOLUTION, CONCENTRATE INTRAVENOUS at 03:00

## 2021-01-01 RX ADMIN — EPINEPHRINE 0.1 MG: 0.1 INJECTION, SOLUTION ENDOTRACHEAL; INTRACARDIAC; INTRAVENOUS at 01:02

## 2021-01-01 RX ADMIN — HEPARIN SODIUM 18 UNITS/KG/HR: 10000 INJECTION, SOLUTION INTRAVENOUS at 12:51

## 2021-01-01 RX ADMIN — CEFAZOLIN SODIUM 1000 MG: 1 SOLUTION INTRAVENOUS at 13:35

## 2021-01-01 RX ADMIN — EPINEPHRINE 1 MG: 0.1 INJECTION, SOLUTION ENDOTRACHEAL; INTRACARDIAC; INTRAVENOUS at 01:41

## 2021-01-01 RX ADMIN — SODIUM CHLORIDE: 0.9 INJECTION, SOLUTION INTRAVENOUS at 14:12

## 2021-01-01 RX ADMIN — FERUMOXYTOL 510 MG: 510 INJECTION INTRAVENOUS at 08:44

## 2021-01-01 RX ADMIN — PROPOFOL 80 MCG/KG/MIN: 10 INJECTION, EMULSION INTRAVENOUS at 13:43

## 2021-01-01 RX ADMIN — SODIUM CHLORIDE, SODIUM LACTATE, POTASSIUM CHLORIDE, AND CALCIUM CHLORIDE: .6; .31; .03; .02 INJECTION, SOLUTION INTRAVENOUS at 13:35

## 2021-01-01 RX ADMIN — DEXTROSE 20 ML/HR: 5 SOLUTION INTRAVENOUS at 09:10

## 2021-01-01 RX ADMIN — FLUTICASONE PROPIONATE AND SALMETEROL 1 PUFF: 100; 50 POWDER RESPIRATORY (INHALATION) at 10:26

## 2021-01-01 RX ADMIN — ACETAMINOPHEN 975 MG: 325 TABLET, FILM COATED ORAL at 22:19

## 2021-01-01 RX ADMIN — FENTANYL CITRATE 50 MCG: 50 INJECTION INTRAMUSCULAR; INTRAVENOUS at 11:05

## 2021-01-01 RX ADMIN — Medication 25 MCG: at 19:25

## 2021-01-01 RX ADMIN — MONTELUKAST 10 MG: 10 TABLET, FILM COATED ORAL at 21:06

## 2021-01-01 RX ADMIN — DIAZEPAM 5 MG: 10 INJECTION, SOLUTION INTRAMUSCULAR; INTRAVENOUS at 11:17

## 2021-01-01 RX ADMIN — HEPARIN SODIUM 5000 UNITS: 5000 INJECTION INTRAVENOUS; SUBCUTANEOUS at 05:37

## 2021-01-01 RX ADMIN — EPINEPHRINE 0.1 MG: 0.1 INJECTION, SOLUTION ENDOTRACHEAL; INTRACARDIAC; INTRAVENOUS at 00:47

## 2021-01-01 RX ADMIN — POTASSIUM CHLORIDE 40 MEQ: 1500 TABLET, EXTENDED RELEASE ORAL at 10:18

## 2021-01-01 RX ADMIN — IOHEXOL 100 ML: 350 INJECTION, SOLUTION INTRAVENOUS at 20:28

## 2021-01-01 RX ADMIN — SODIUM CHLORIDE, SODIUM GLUCONATE, SODIUM ACETATE, POTASSIUM CHLORIDE, MAGNESIUM CHLORIDE, SODIUM PHOSPHATE, DIBASIC, AND POTASSIUM PHOSPHATE 1000 ML: .53; .5; .37; .037; .03; .012; .00082 INJECTION, SOLUTION INTRAVENOUS at 02:45

## 2021-01-01 RX ADMIN — HEPARIN SODIUM 5000 UNITS: 5000 INJECTION INTRAVENOUS; SUBCUTANEOUS at 05:48

## 2021-01-01 RX ADMIN — AMIODARONE HYDROCHLORIDE 1 MG/MIN: 50 INJECTION, SOLUTION INTRAVENOUS at 03:48

## 2021-01-01 RX ADMIN — SODIUM CHLORIDE, SODIUM LACTATE, POTASSIUM CHLORIDE, AND CALCIUM CHLORIDE 125 ML/HR: .6; .31; .03; .02 INJECTION, SOLUTION INTRAVENOUS at 11:40

## 2021-01-01 RX ADMIN — ROCURONIUM BROMIDE 50 MG: 50 INJECTION, SOLUTION INTRAVENOUS at 14:02

## 2021-01-01 RX ADMIN — ACETAMINOPHEN 975 MG: 325 TABLET, FILM COATED ORAL at 21:06

## 2021-01-01 RX ADMIN — PROPOFOL 130 MG: 10 INJECTION, EMULSION INTRAVENOUS at 08:38

## 2021-01-01 RX ADMIN — GLYCOPYRROLATE 0.4 MG: 0.2 INJECTION, SOLUTION INTRAMUSCULAR; INTRAVENOUS at 16:53

## 2021-01-01 RX ADMIN — MIDAZOLAM 2 MG: 1 INJECTION INTRAMUSCULAR; INTRAVENOUS at 10:55

## 2021-01-01 RX ADMIN — EPINEPHRINE 0.1 MG: 0.1 INJECTION, SOLUTION ENDOTRACHEAL; INTRACARDIAC; INTRAVENOUS at 00:59

## 2021-01-01 RX ADMIN — SODIUM BICARBONATE 50 MEQ: 84 INJECTION, SOLUTION INTRAVENOUS at 01:46

## 2021-01-01 RX ADMIN — ACETAMINOPHEN 975 MG: 325 TABLET, FILM COATED ORAL at 05:08

## 2021-01-01 RX ADMIN — EPINEPHRINE 0.1 MG: 0.1 INJECTION, SOLUTION ENDOTRACHEAL; INTRACARDIAC; INTRAVENOUS at 00:50

## 2021-01-01 RX ADMIN — ALBUMIN (HUMAN) 25 G: 12.5 INJECTION, SOLUTION INTRAVENOUS at 02:45

## 2021-01-01 RX ADMIN — HEPARIN SODIUM 5000 UNITS: 5000 INJECTION INTRAVENOUS; SUBCUTANEOUS at 13:19

## 2021-01-01 RX ADMIN — SODIUM CHLORIDE, SODIUM LACTATE, POTASSIUM CHLORIDE, AND CALCIUM CHLORIDE 100 ML/HR: .6; .31; .03; .02 INJECTION, SOLUTION INTRAVENOUS at 20:10

## 2021-01-01 RX ADMIN — METRONIDAZOLE 500 MG: 500 INJECTION, SOLUTION INTRAVENOUS at 13:48

## 2021-01-01 RX ADMIN — HEPARIN SODIUM 5000 UNITS: 5000 INJECTION INTRAVENOUS; SUBCUTANEOUS at 22:19

## 2021-01-01 RX ADMIN — ACETAMINOPHEN 975 MG: 325 TABLET, FILM COATED ORAL at 05:37

## 2021-01-01 RX ADMIN — FLUOROURACIL 670 MG: 50 INJECTION, SOLUTION INTRAVENOUS at 11:25

## 2021-01-01 RX ADMIN — NEOSTIGMINE METHYLSULFATE 2 MG: 1 INJECTION INTRAVENOUS at 16:21

## 2021-01-01 RX ADMIN — MONTELUKAST 10 MG: 10 TABLET, FILM COATED ORAL at 22:19

## 2021-01-01 RX ADMIN — HEPARIN SODIUM 5000 UNITS: 5000 INJECTION INTRAVENOUS; SUBCUTANEOUS at 05:08

## 2021-01-01 RX ADMIN — POTASSIUM CHLORIDE 40 MEQ: 1500 TABLET, EXTENDED RELEASE ORAL at 08:57

## 2021-01-01 RX ADMIN — LEUCOVORIN CALCIUM 650 MG: 100 INJECTION, POWDER, LYOPHILIZED, FOR SUSPENSION INTRAMUSCULAR; INTRAVENOUS at 09:17

## 2021-01-01 RX ADMIN — SODIUM CHLORIDE 500 ML: 0.9 INJECTION, SOLUTION INTRAVENOUS at 11:23

## 2021-01-01 RX ADMIN — Medication 100 MG: at 13:40

## 2021-01-01 RX ADMIN — MIDAZOLAM 2 MG: 1 INJECTION INTRAMUSCULAR; INTRAVENOUS at 11:05

## 2021-01-01 RX ADMIN — SODIUM CHLORIDE 20 ML/HR: 0.9 INJECTION, SOLUTION INTRAVENOUS at 08:37

## 2021-01-01 RX ADMIN — SODIUM CHLORIDE, SODIUM GLUCONATE, SODIUM ACETATE, POTASSIUM CHLORIDE, MAGNESIUM CHLORIDE, SODIUM PHOSPHATE, DIBASIC, AND POTASSIUM PHOSPHATE 100 ML/HR: .53; .5; .37; .037; .03; .012; .00082 INJECTION, SOLUTION INTRAVENOUS at 17:24

## 2021-01-01 RX ADMIN — DEXTROSE, SODIUM CHLORIDE, AND POTASSIUM CHLORIDE 84 ML/HR: 5; .45; .15 INJECTION INTRAVENOUS at 20:49

## 2021-01-01 RX ADMIN — PROPOFOL 150 MG: 10 INJECTION, EMULSION INTRAVENOUS at 13:40

## 2021-01-01 RX ADMIN — EPINEPHRINE 0.1 MG: 0.1 INJECTION, SOLUTION ENDOTRACHEAL; INTRACARDIAC; INTRAVENOUS at 00:53

## 2021-01-01 RX ADMIN — FLUTICASONE PROPIONATE AND SALMETEROL 1 PUFF: 100; 50 POWDER RESPIRATORY (INHALATION) at 08:21

## 2021-01-01 RX ADMIN — Medication 100 MCG/HR: at 02:45

## 2021-01-01 RX ADMIN — HYDROMORPHONE HYDROCHLORIDE 1 MG: 1 INJECTION, SOLUTION INTRAMUSCULAR; INTRAVENOUS; SUBCUTANEOUS at 14:15

## 2021-01-01 RX ADMIN — SODIUM BICARBONATE 50 MEQ: 84 INJECTION, SOLUTION INTRAVENOUS at 01:41

## 2021-01-01 RX ADMIN — SUGAMMADEX 200 MG: 100 INJECTION, SOLUTION INTRAVENOUS at 16:45

## 2021-01-01 RX ADMIN — ACETAMINOPHEN 975 MG: 325 TABLET, FILM COATED ORAL at 14:05

## 2021-01-01 RX ADMIN — ONDANSETRON 4 MG: 2 INJECTION INTRAMUSCULAR; INTRAVENOUS at 11:24

## 2021-01-01 RX ADMIN — ACETAMINOPHEN 975 MG: 325 TABLET, FILM COATED ORAL at 16:33

## 2021-01-01 RX ADMIN — PHENYLEPHRINE HYDROCHLORIDE 30 MCG/MIN: 10 INJECTION INTRAVENOUS at 13:43

## 2021-01-01 RX ADMIN — CEFAZOLIN 1000 MG: 1 INJECTION, POWDER, FOR SOLUTION INTRAMUSCULAR; INTRAVENOUS at 10:55

## 2021-01-01 RX ADMIN — HEPARIN SODIUM 5000 UNITS: 5000 INJECTION INTRAVENOUS; SUBCUTANEOUS at 00:13

## 2021-01-01 RX ADMIN — HEPARIN SODIUM 5000 UNITS: 5000 INJECTION INTRAVENOUS; SUBCUTANEOUS at 12:09

## 2021-01-01 RX ADMIN — PROPOFOL 20 MG: 10 INJECTION, EMULSION INTRAVENOUS at 08:48

## 2021-01-01 RX ADMIN — EPINEPHRINE 1 MG: 0.1 INJECTION, SOLUTION ENDOTRACHEAL; INTRACARDIAC; INTRAVENOUS at 01:44

## 2021-01-01 RX ADMIN — SODIUM BICARBONATE 50 MEQ: 84 INJECTION, SOLUTION INTRAVENOUS at 00:51

## 2021-01-01 RX ADMIN — SODIUM BICARBONATE 50 MEQ: 84 INJECTION, SOLUTION INTRAVENOUS at 00:59

## 2021-01-01 RX ADMIN — PROPOFOL 10 MG: 10 INJECTION, EMULSION INTRAVENOUS at 08:41

## 2021-01-01 RX ADMIN — OXALIPLATIN 142.8 MG: 5 INJECTION, SOLUTION INTRAVENOUS at 09:18

## 2021-01-01 RX ADMIN — DEXAMETHASONE SODIUM PHOSPHATE: 10 INJECTION, SOLUTION INTRAMUSCULAR; INTRAVENOUS at 08:39

## 2021-01-01 RX ADMIN — SODIUM CHLORIDE 20 ML/HR: 0.9 INJECTION, SOLUTION INTRAVENOUS at 08:44

## 2021-01-01 RX ADMIN — SODIUM CHLORIDE, SODIUM LACTATE, POTASSIUM CHLORIDE, AND CALCIUM CHLORIDE 125 ML/HR: .6; .31; .03; .02 INJECTION, SOLUTION INTRAVENOUS at 20:46

## 2021-01-01 RX ADMIN — FENTANYL CITRATE 50 MCG: 50 INJECTION INTRAMUSCULAR; INTRAVENOUS at 13:40

## 2021-01-01 RX ADMIN — ACETAMINOPHEN 975 MG: 325 TABLET, FILM COATED ORAL at 11:29

## 2021-01-01 RX ADMIN — HYDROMORPHONE HYDROCHLORIDE 0.5 MG: 4 INJECTION, SOLUTION INTRAMUSCULAR; INTRAVENOUS; SUBCUTANEOUS at 11:17

## 2021-01-01 RX ADMIN — AMIODARONE HYDROCHLORIDE 150 MG: 50 INJECTION, SOLUTION INTRAVENOUS at 01:08

## 2021-01-01 RX ADMIN — DEXTROSE AND SODIUM CHLORIDE 100 ML/HR: 5; .45 INJECTION, SOLUTION INTRAVENOUS at 06:37

## 2021-01-01 RX ADMIN — ATORVASTATIN CALCIUM 40 MG: 40 TABLET, FILM COATED ORAL at 17:03

## 2021-01-01 RX ADMIN — LIDOCAINE HYDROCHLORIDE 50 MG: 10 INJECTION, SOLUTION EPIDURAL; INFILTRATION; INTRACAUDAL; PERINEURAL at 13:40

## 2021-01-01 RX ADMIN — EPINEPHRINE 0.1 MG: 0.1 INJECTION, SOLUTION ENDOTRACHEAL; INTRACARDIAC; INTRAVENOUS at 01:05

## 2021-01-01 RX ADMIN — ATORVASTATIN CALCIUM 40 MG: 40 TABLET, FILM COATED ORAL at 20:10

## 2021-01-01 RX ADMIN — AMLODIPINE BESYLATE 7.5 MG: 5 TABLET ORAL at 17:03

## 2021-01-01 RX ADMIN — LABETALOL 20 MG/4 ML (5 MG/ML) INTRAVENOUS SYRINGE 10 MG: at 14:31

## 2021-01-01 RX ADMIN — HEPARIN SODIUM 4800 UNITS: 1000 INJECTION INTRAVENOUS; SUBCUTANEOUS at 12:50

## 2021-01-01 RX ADMIN — ATORVASTATIN CALCIUM 40 MG: 40 TABLET, FILM COATED ORAL at 17:18

## 2021-01-01 RX ADMIN — SODIUM CHLORIDE, SODIUM LACTATE, POTASSIUM CHLORIDE, AND CALCIUM CHLORIDE: .6; .31; .03; .02 INJECTION, SOLUTION INTRAVENOUS at 08:28

## 2021-01-01 RX ADMIN — PROPOFOL 20 MG: 10 INJECTION, EMULSION INTRAVENOUS at 08:58

## 2021-01-01 RX ADMIN — DEXAMETHASONE SODIUM PHOSPHATE 5 MG: 4 INJECTION INTRA-ARTICULAR; INTRALESIONAL; INTRAMUSCULAR; INTRAVENOUS; SOFT TISSUE at 14:03

## 2021-01-01 RX ADMIN — CEFTRIAXONE SODIUM 1000 MG: 10 INJECTION, POWDER, FOR SOLUTION INTRAVENOUS at 22:51

## 2021-01-01 RX ADMIN — Medication 25 MCG: at 20:05

## 2021-01-01 RX ADMIN — HEPARIN SODIUM 5000 UNITS: 5000 INJECTION INTRAVENOUS; SUBCUTANEOUS at 14:05

## 2021-01-01 RX ADMIN — FLUTICASONE PROPIONATE AND SALMETEROL 1 PUFF: 100; 50 POWDER RESPIRATORY (INHALATION) at 08:16

## 2021-01-01 RX ADMIN — FENTANYL CITRATE 50 MCG: 50 INJECTION INTRAMUSCULAR; INTRAVENOUS at 14:02

## 2021-01-01 RX ADMIN — ONDANSETRON 4 MG: 2 INJECTION INTRAMUSCULAR; INTRAVENOUS at 16:08

## 2021-01-01 RX ADMIN — PROPOFOL 20 MG: 10 INJECTION, EMULSION INTRAVENOUS at 08:54

## 2021-01-01 RX ADMIN — IOHEXOL 10 ML: 350 INJECTION, SOLUTION INTRAVENOUS at 12:04

## 2021-01-01 RX ADMIN — AMLODIPINE BESYLATE 7.5 MG: 5 TABLET ORAL at 17:18

## 2021-01-01 RX ADMIN — POTASSIUM CHLORIDE 20 MEQ: 1500 TABLET, EXTENDED RELEASE ORAL at 08:15

## 2021-01-01 RX ADMIN — INSULIN LISPRO 4 UNITS: 100 INJECTION, SOLUTION INTRAVENOUS; SUBCUTANEOUS at 02:45

## 2021-01-01 RX ADMIN — MONTELUKAST 10 MG: 10 TABLET, FILM COATED ORAL at 21:22

## 2021-01-01 RX ADMIN — SODIUM CHLORIDE, SODIUM LACTATE, POTASSIUM CHLORIDE, AND CALCIUM CHLORIDE 125 ML/HR: .6; .31; .03; .02 INJECTION, SOLUTION INTRAVENOUS at 03:06

## 2021-01-01 RX ADMIN — SODIUM CHLORIDE: 0.9 INJECTION, SOLUTION INTRAVENOUS at 13:42

## 2021-01-01 RX ADMIN — PROPOFOL 20 MG: 10 INJECTION, EMULSION INTRAVENOUS at 08:44

## 2021-01-01 RX ADMIN — PROPOFOL 20 MG: 10 INJECTION, EMULSION INTRAVENOUS at 09:00

## 2021-01-01 RX ADMIN — PROPOFOL 30 MG: 10 INJECTION, EMULSION INTRAVENOUS at 08:51

## 2021-01-01 RX ADMIN — LIDOCAINE HYDROCHLORIDE 0.2 ML: 10 INJECTION, SOLUTION EPIDURAL; INFILTRATION; INTRACAUDAL; PERINEURAL at 11:40

## 2021-01-01 RX ADMIN — AMOXICILLIN AND CLAVULANATE POTASSIUM 1 TABLET: 875; 125 TABLET, FILM COATED ORAL at 21:20

## 2021-01-01 RX ADMIN — EPINEPHRINE 0.1 MG: 0.1 INJECTION, SOLUTION ENDOTRACHEAL; INTRACARDIAC; INTRAVENOUS at 02:38

## 2021-01-01 RX ADMIN — IOHEXOL 85 ML: 350 INJECTION, SOLUTION INTRAVENOUS at 11:22

## 2021-01-01 RX ADMIN — ALBUMIN (HUMAN): 12.5 INJECTION, SOLUTION INTRAVENOUS at 13:47

## 2021-01-01 RX ADMIN — DEXTROSE, SODIUM CHLORIDE, AND POTASSIUM CHLORIDE 84 ML/HR: 5; .45; .15 INJECTION INTRAVENOUS at 08:58

## 2021-01-04 NOTE — TELEPHONE ENCOUNTER
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

## 2021-02-08 NOTE — ED PROVIDER NOTES
Final Diagnosis:  1  Colonic thickening    2  Diverticulitis        Chief Complaint   Patient presents with    Abdominal Pain     achy feeling to left upper and lower quad of abd, not sure if she pulled something while removing snow, this pain started yesterday  able to eat and drink, able to urinate and have bm  was at [facility] and told to come here for more testing      HPI  Pain yest morning  Constant  Character is achey dull LLQ into left flank  Never had before  Shoveling snow 1 week prior and wonders if this is the problem  No change in BM no n/v, no urinary complaints  No major abd surgery, tubes tied    ddx  Pulled muscle, worse with movement  Diverticulitis  Urinary pathology  Hernia    - No language barrier    - History obtained from patient  - There are no limitations to the history obtained  - Previous charting underwent limited review with attention to labs, ekgs, and prior imaging  PMH:   has a past medical history of Allergic rhinitis, Dysuria (5/14/2020), Hypokalemia, and Urine frequency (5/25/2016)  PSH:   has a past surgical history that includes Colposcopy and Diagnostic laparoscopy (7781)  Social History:  Lives at home, high functioning with ADL    ROS:  Review of Systems   Constitutional: Negative for activity change, appetite change, chills, diaphoresis, fatigue and fever  HENT: Negative for congestion, facial swelling, mouth sores, rhinorrhea, sinus pain, sneezing, sore throat, trouble swallowing and voice change  Eyes: Negative for photophobia and visual disturbance  Respiratory: Negative for cough, chest tightness, shortness of breath, wheezing and stridor  Cardiovascular: Negative for chest pain, palpitations and leg swelling  Gastrointestinal: Positive for abdominal pain  Negative for abdominal distention, blood in stool, constipation, diarrhea, nausea and vomiting     Genitourinary: Negative for decreased urine volume, difficulty urinating, dysuria, flank pain, frequency, menstrual problem, pelvic pain, vaginal bleeding and vaginal discharge  Musculoskeletal: Negative for arthralgias, gait problem, neck pain and neck stiffness  Skin: Negative for color change, rash and wound  Neurological: Negative for dizziness, syncope, facial asymmetry, speech difficulty, weakness, light-headedness, numbness and headaches  Psychiatric/Behavioral: Negative for confusion, self-injury and suicidal ideas  The patient is not nervous/anxious  PE:   Vitals:    02/08/21 1438 02/08/21 1817   BP: (!) 173/84 (!) 149/116   BP Location:  Left arm   Pulse: 86 80   Resp: 20 20   Temp: 98 1 °F (36 7 °C)    SpO2: 98% 98%   Weight: 71 2 kg (157 lb)      Vitals reviewed by me  Physical Exam  Vitals signs and nursing note reviewed  Constitutional:       General: She is not in acute distress  Appearance: She is well-developed  She is not diaphoretic  HENT:      Head: Normocephalic and atraumatic  Right Ear: External ear normal       Left Ear: External ear normal       Nose: Nose normal    Eyes:      General: No scleral icterus  Conjunctiva/sclera: Conjunctivae normal       Pupils: Pupils are equal, round, and reactive to light  Neck:      Musculoskeletal: Normal range of motion and neck supple  Cardiovascular:      Rate and Rhythm: Normal rate and regular rhythm  Heart sounds: Normal heart sounds  No murmur  Pulmonary:      Effort: Pulmonary effort is normal  No respiratory distress  Breath sounds: Normal breath sounds  No stridor  No wheezing or rales  Abdominal:      General: Bowel sounds are normal  There is no distension  Palpations: Abdomen is soft  There is no mass  Tenderness: There is no abdominal tenderness  There is no right CVA tenderness, left CVA tenderness, guarding or rebound  Hernia: No hernia is present  Musculoskeletal: Normal range of motion  General: No tenderness or deformity     Lymphadenopathy: Cervical: No cervical adenopathy  Skin:     General: Skin is warm and dry  Capillary Refill: Capillary refill takes less than 2 seconds  Findings: No rash  Neurological:      Mental Status: She is alert and oriented to person, place, and time  Cranial Nerves: No cranial nerve deficit  Sensory: No sensory deficit  Psychiatric:         Behavior: Behavior normal          Thought Content: Thought content normal          Judgment: Judgment normal           A:  - Nursing note reviewed  Ddx as in HPI  Physical exam is unremarkable  There are leuks in urine will see if micro explains symptoms  Divertic possible but unlikely given exam     Micro aseptic, will scan for divertic to explain LLQ tenderness and sterile pyuria    divertic +- malignancy  -  Will follow with GI for biopsy    Will treat outpatietn with augmentin, first dose here  ED Course as of Feb 09 1110   Mon Feb 08, 2021   1718 Leukocytes, UA(!): Small     CT abdomen pelvis with contrast   Final Result      1  Focal increased enhancement of the distal transverse colon with mild surrounding fat stranding may be due to focal colitis versus diverticulitis however eccentric, irregular, and lobulated dilatation and surrounding increased vascularity is    suspicious for malignancy  Further evaluation with colonoscopy is recommended to evaluate for malignancy  2   Small hiatal hernia  3   Cholelithiasis without evidence of cholecystitis  4   Other findings as above  The study was marked in [facility] for immediate notification  The study was marked in [facility] for follow-up              Workstation performed: [id number]           Orders Placed This Encounter   Procedures    CT abdomen pelvis with contrast    CBC and differential    Basic metabolic panel    Urine Microscopic     Labs Reviewed   CBC AND DIFFERENTIAL - Abnormal       Result Value Ref Range Status    WBC 9 02  4 31 - 10 16 Thousand/uL Final    RBC 5  00  3 81 - 5 12 Million/uL Final    Hemoglobin 12 0  11 5 - 15 4 g/dL Final    Hematocrit 38 1  34 8 - 46 1 % Final    MCV 76 (*) 82 - 98 fL Final    MCH 24 0 (*) 26 8 - 34 3 pg Final    MCHC 31 5  31 4 - 37 4 g/dL Final    RDW 15 0  11 6 - 15 1 % Final    MPV 10 3  8 9 - 12 7 fL Final    Platelets 887  354 - 390 Thousands/uL Final    nRBC 0  /100 WBCs Final    Neutrophils Relative 65  43 - 75 % Final    Immat GRANS % 0  0 - 2 % Final    Lymphocytes Relative 23  14 - 44 % Final    Monocytes Relative 8  4 - 12 % Final    Eosinophils Relative 4  0 - 6 % Final    Basophils Relative 0  0 - 1 % Final    Neutrophils Absolute 5 89  1 85 - 7 62 Thousands/µL Final    Immature Grans Absolute 0 03  0 00 - 0 20 Thousand/uL Final    Lymphocytes Absolute 2 04  0 60 - 4 47 Thousands/µL Final    Monocytes Absolute 0 69  0 17 - 1 22 Thousand/µL Final    Eosinophils Absolute 0 34  0 00 - 0 61 Thousand/µL Final    Basophils Absolute 0 03  0 00 - 0 10 Thousands/µL Final   BASIC METABOLIC PANEL - Abnormal    Sodium 141  136 - 145 mmol/L Final    Potassium 3 7  3 5 - 5 3 mmol/L Final    Chloride 109 (*) 100 - 108 mmol/L Final    CO2 24  21 - 32 mmol/L Final    ANION GAP 8  4 - 13 mmol/L Final    BUN 13  5 - 25 mg/dL Final    Creatinine 0 95  0 60 - 1 30 mg/dL Final    Comment: Standardized to IDMS reference method    Glucose 109  65 - 140 mg/dL Final    Comment: If the patient is fasting, the ADA then defines impaired fasting glucose as > 100 mg/dL and diabetes as > or equal to 123 mg/dL  Specimen collection should occur prior to Sulfasalazine administration due to the potential for falsely depressed results  Specimen collection should occur prior to Sulfapyridine administration due to the potential for falsely elevated results      Calcium 10 3 (*) 8 3 - 10 1 mg/dL Final    eGFR 58  ml/min/1 73sq m Final    Narrative:     [place] guidelines for Chronic Kidney Disease (CKD):     Stage 1 with normal or high GFR (GFR > 90 mL/min/1 73 square meters)    Stage 2 Mild CKD (GFR = 60-89 mL/min/1 73 square meters)    Stage 3A Moderate CKD (GFR = 45-59 mL/min/1 73 square meters)    Stage 3B Moderate CKD (GFR = 30-44 mL/min/1 73 square meters)    Stage 4 Severe CKD (GFR = 15-29 mL/min/1 73 square meters)    Stage 5 End Stage CKD (GFR <15 mL/min/1 73 square meters)  Note: GFR calculation is accurate only with a steady state creatinine   URINE MACROSCOPIC, POC - Abnormal    Color, UA Yellow   Final    Clarity, UA Clear   Final    pH, UA 5 5  4 5 - 8 0 Final    Leukocytes, UA Small (*) Negative Final    Nitrite, UA Negative  Negative Final    Protein, UA Negative  Negative mg/dl Final    Glucose, UA Negative  Negative mg/dl Final    Ketones, UA Negative  Negative mg/dl Final    Urobilinogen, UA 0 2  0 2, 1 0 E U /dl E U /dl Final    Bilirubin, UA Negative  Negative Final    Blood, UA Negative  Negative Final    Specific [place], UA 1 015  1 003 - 1 030 Final    Narrative:     CLINITEK RESULT   URINE MICROSCOPIC - Normal    RBC, UA None Seen  None Seen, 2-4 /hpf Final    WBC, UA 2-4  None Seen, 2-4 /hpf Final    Epithelial Cells None Seen  None Seen, Occasional /hpf Final    Bacteria, UA None Seen  None Seen, Occasional /hpf Final    Hyaline Casts, UA None Seen  None Seen /lpf Final       Final Diagnosis:  1  Colonic thickening    2  Diverticulitis        P:  - patient to be treated at home with augmentin  - patient to follow with GI   - patient will call their PCP   to let them know they were in the emergency department  We discuss return precautions, including needs to call 911 vs returning to ED by private vehicle  Patient expresses understanding and comfort with discharge   Return precautions provided for bleeding, constipation, fever, systemic symptoms, no PO intake or worsening pain    Medications   dicyclomine (BENTYL) tablet 20 mg (20 mg Oral Given 2/8/21 1633)   acetaminophen (TYLENOL) tablet 975 mg (975 mg Oral Given 2/8/21 1633)   iohexol (OMNIPAQUE) 350 MG/ML injection (SINGLE-DOSE) 100 mL (100 mL Intravenous Given 2/8/21 2028)   amoxicillin-clavulanate (AUGMENTIN) 875-125 mg per tablet 1 tablet (1 tablet Oral Given 2/8/21 2120)     Time reflects when diagnosis was documented in both MDM as applicable and the Disposition within this note     Time User Action Codes Description Comment    2/8/2021  8:58 PM Jenny Ryan Add [K63 9] Colonic thickening     2/8/2021  8:58 PM Jenny Connor Add [K57 92] Diverticulitis       ED Disposition     ED Disposition Condition Date/Time Comment    Discharge Stable Mon Feb 8, 2021  8:56 PM Ruben Ruvalcaba discharge to home/self care              Follow-up Information     Follow up With Specialties Details Why Contact Info Additional Information    Job Longoria MD Internal Medicine   [address] [place]        [facility] Schedule an appointment as soon as possible for a visit  follow up rule out malignancy [address] [facility] [address]  [facility] [address] [place] [id number]  [facility] [address] [facility], [address] [facility] [address], [address], [place], [place], [address]        Discharge Medication List as of 2/8/2021  8:59 PM      START taking these medications    Details   amoxicillin-clavulanate (AUGMENTIN) 875-125 mg per tablet Take 1 tablet by mouth every 12 (twelve) hours for 7 days, Starting Mon 2/8/2021, Until Mon 2/15/2021, Print         CONTINUE these medications which have NOT CHANGED    Details   !! amLODIPine (NORVASC) 2 5 mg tablet TAKE 1 TABLET BY MOUTH EVERY DAY, Normal      !! amLODIPine (NORVASC) 5 mg tablet TAKE 1 TABLET DAILY ALONG WITH A 2 5 MG TABLET, Normal      atorvastatin (LIPITOR) 40 mg tablet TAKE 1 TABLET BY MOUTH EVERY DAY, Normal      Calcium Carbonate-Vitamin D (CALCIUM 600+D) 600-200 MG-UNIT TABS Take 1 tablet by mouth daily, Historical Med fluticasone (FLONASE) 50 mcg/act nasal spray SPRAY 1 SPRAY INTO EACH NOSTRIL EVERY DAY, Normal      hydrochlorothiazide (HYDRODIURIL) 25 mg tablet TAKE 1 TABLET BY MOUTH EVERY DAY, Normal      Klor-Con M20 20 MEQ tablet TAKE 1 TABLET (20 MEQ TOTAL) BY MOUTH 2 (TWO) TIMES A DAY, Normal      loratadine (CLARITIN) 10 mg tablet Take 1 tablet by mouth daily as needed, Historical Med      losartan (COZAAR) 100 MG tablet TAKE 1 TABLET BY MOUTH EVERY DAY, Normal      montelukast (SINGULAIR) 10 mg tablet TAKE 1 TABLET AT BEDTIME , Normal      WIXELA INHUB 100-50 MCG/DOSE inhaler TAKE 1 PUFF BY MOUTH EVERY DAY, Normal       !! - Potential duplicate medications found  Please discuss with provider  No discharge procedures on file  Prior to Admission Medications   Prescriptions Last Dose Informant Patient Reported? Taking? Calcium Carbonate-Vitamin D (CALCIUM 600+D) 600-200 MG-UNIT TABS  Self Yes No   Sig: Take 1 tablet by mouth daily   Klor-Con M20 20 MEQ tablet   No No   Sig: TAKE 1 TABLET (20 MEQ TOTAL) BY MOUTH 2 (TWO) TIMES A DAY   WIXELA INHUB 100-50 MCG/DOSE inhaler  Self No No   Sig: TAKE 1 PUFF BY MOUTH EVERY DAY   amLODIPine (NORVASC) 2 5 mg tablet   No No   Sig: TAKE 1 TABLET BY MOUTH EVERY DAY   amLODIPine (NORVASC) 5 mg tablet   No No   Sig: TAKE 1 TABLET DAILY ALONG WITH A 2 5 MG TABLET   atorvastatin (LIPITOR) 40 mg tablet   No No   Sig: TAKE 1 TABLET BY MOUTH EVERY DAY   fluticasone (FLONASE) 50 mcg/act nasal spray  Self No No   Sig: SPRAY 1 SPRAY INTO EACH NOSTRIL EVERY DAY   hydrochlorothiazide (HYDRODIURIL) 25 mg tablet   No No   Sig: TAKE 1 TABLET BY MOUTH EVERY DAY   loratadine (CLARITIN) 10 mg tablet  Self Yes No   Sig: Take 1 tablet by mouth daily as needed   losartan (COZAAR) 100 MG tablet   No No   Sig: TAKE 1 TABLET BY MOUTH EVERY DAY   montelukast (SINGULAIR) 10 mg tablet   No No   Sig: TAKE 1 TABLET AT BEDTIME        Facility-Administered Medications: None       Portions of the record may have been created with voice recognition software  Occasional wrong word or "sound a like" substitutions may have occurred due to the inherent limitations of voice recognition software  Read the chart carefully and recognize, using context, where substitutions have occurred      Electronically signed by:  Torrey Landrum, PGY 2, MD Victoria Gonzales MD  02/09/21 [id number]

## 2021-02-08 NOTE — PROGRESS NOTES
[facility]        NAME: Michael Torres is a 67 y o  female  : 1945    MRN: [id number]  DATE: 2021  TIME: 2:18 PM    Assessment and Plan   LLQ abdominal pain [R10 32]  1  LLQ abdominal pain  Transfer to other facility         Patient Instructions      I recommend patient go to the emergency room for further evaluation of left lower quadrant pain patient would like to go by private vehicle  Follow up with PCP in 3-5 days  Proceed to  ER if symptoms worsen  Chief Complaint     Chief Complaint   Patient presents with    Abdominal Pain     pt states started yesterday with LLQ pain, went away for about 1 hour then returned  Denie nausea, vomiting or diarrhea         History of Present Illness         Patient presents with left lower quadrant pain that started yesterday  He states it is a constant pain  She denies any blood in her stools or black tarry stools  She denies any urinary complaints  Denies any nausea vomiting fevers or chills  She denies any history of diverticulitis  She states she thinks she pulled a muscle from shoveling the other day  The pain feels slightly better with food  Review of Systems   Review of Systems   Constitutional: Negative  HENT: Negative  Respiratory: Negative  Cardiovascular: Negative  Gastrointestinal: Positive for abdominal pain  Negative for blood in stool, constipation, diarrhea, nausea, rectal pain and vomiting  Genitourinary: Negative  Musculoskeletal: Negative  Skin: Negative  Neurological: Negative  Psychiatric/Behavioral: Negative            Current Medications       Current Outpatient Medications:     amLODIPine (NORVASC) 2 5 mg tablet, TAKE 1 TABLET BY MOUTH EVERY DAY, Disp: 90 tablet, Rfl: 2    amLODIPine (NORVASC) 5 mg tablet, TAKE 1 TABLET DAILY ALONG WITH A 2 5 MG TABLET, Disp: 90 tablet, Rfl: 1    atorvastatin (LIPITOR) 40 mg tablet, TAKE 1 TABLET BY MOUTH EVERY DAY, Disp: 90 tablet, Rfl: 3   Calcium Carbonate-Vitamin D (CALCIUM 600+D) 600-200 MG-UNIT TABS, Take 1 tablet by mouth daily, Disp: , Rfl:     fluticasone (FLONASE) 50 mcg/act nasal spray, SPRAY 1 SPRAY INTO EACH NOSTRIL EVERY DAY, Disp: 48 mL, Rfl: 1    hydrochlorothiazide (HYDRODIURIL) 25 mg tablet, TAKE 1 TABLET BY MOUTH EVERY DAY, Disp: 90 tablet, Rfl: 1    Klor-Con M20 20 MEQ tablet, TAKE 1 TABLET (20 MEQ TOTAL) BY MOUTH 2 (TWO) TIMES A DAY, Disp: 180 tablet, Rfl: 1    loratadine (CLARITIN) 10 mg tablet, Take 1 tablet by mouth daily as needed, Disp: , Rfl:     losartan (COZAAR) 100 MG tablet, TAKE 1 TABLET BY MOUTH EVERY DAY, Disp: 90 tablet, Rfl: 1    montelukast (SINGULAIR) 10 mg tablet, TAKE 1 TABLET AT BEDTIME , Disp: 90 tablet, Rfl: 3    WIXELA INHUB 100-50 MCG/DOSE inhaler, TAKE 1 PUFF BY MOUTH EVERY DAY, Disp: 180 each, Rfl: 1    Current Allergies     Allergies as of 02/08/2021 - Reviewed 02/08/2021   Allergen Reaction Noted    Sulfa antibiotics Other (See Comments) and Hives 09/24/2014    Amoxicillin GI Intolerance 05/14/2020    Other Allergic Rhinitis 06/06/2013            The following portions of the patient's history were reviewed and updated as appropriate: allergies, current medications, past family history, past medical history, past social history, past surgical history and problem list      Past Medical History:   Diagnosis Date    Allergic rhinitis     last assessed: 12/12/2012    Dysuria 5/14/2020    Hypokalemia     last assessed: 12/14/2012    Urine frequency 5/25/2016       Past Surgical History:   Procedure Laterality Date    COLPOSCOPY      DIAGNOSTIC LAPAROSCOPY  1987       Family History   Problem Relation Age of Onset    Lung cancer Mother 80    Asthma Family     Coronary artery disease Family     Thyroid disease Family     No Known Problems Father     No Known Problems Daughter     No Known Problems Maternal Grandmother     Cancer Maternal Grandfather         unknown type/age    No Known Problems Paternal Grandmother     No Known Problems Paternal Grandfather     No Known Problems Son     No Known Problems Maternal Aunt     No Known Problems Paternal Aunt          Medications have been verified  Objective   /67   Pulse 90   Temp 98 1 °F (36 7 °C)   Resp 20   LMP  (LMP Unknown)   SpO2 98%        Physical Exam     Physical Exam  Vitals signs and nursing note reviewed  Constitutional:       General: She is not in acute distress  Appearance: Normal appearance  She is not ill-appearing or toxic-appearing  Cardiovascular:      Rate and Rhythm: Normal rate and regular rhythm  Pulses: Normal pulses  Heart sounds: Normal heart sounds  Pulmonary:      Effort: Pulmonary effort is normal       Breath sounds: Normal breath sounds  No wheezing  Abdominal:      General: Bowel sounds are normal       Palpations: Abdomen is soft  Tenderness: There is abdominal tenderness in the left lower quadrant  There is no right CVA tenderness, left CVA tenderness, guarding or rebound  Negative signs include Bonner's sign, Rovsing's sign, McBurney's sign, psoas sign and obturator sign  Skin:     General: Skin is warm and dry  Neurological:      General: No focal deficit present  Mental Status: She is alert and oriented to person, place, and time     Psychiatric:         Mood and Affect: Mood normal          Behavior: Behavior normal

## 2021-02-09 NOTE — DISCHARGE INSTRUCTIONS
1   Focal increased enhancement of the distal transverse colon with mild surrounding fat stranding may be due to focal colitis versus diverticulitis however eccentric, irregular, and lobulated dilatation and surrounding increased vascularity is   suspicious for malignancy  Further evaluation with colonoscopy is recommended to evaluate for malignancy  2   Small hiatal hernia  3   Cholelithiasis without evidence of cholecystitis  4   Other findings as above

## 2021-02-12 NOTE — ED ATTENDING ATTESTATION
2/8/2021  I, Juliana Perales MD, saw and evaluated the patient  I have discussed the patient with the resident/non-physician practitioner and agree with the resident's/non-physician practitioner's findings, Plan of Care, and MDM as documented in the resident's/non-physician practitioner's note, except where noted  All available labs and Radiology studies were reviewed  I was present for key portions of any procedure(s) performed by the resident/non-physician practitioner and I was immediately available to provide assistance  At this point I agree with the current assessment done in the Emergency Department  I have conducted an independent evaluation of this patient a history and physical is as follows:    ED Course     67-year-old female with left lower quadrant abdominal pain for past 24 hours unclear if she pulled something while shoveling snow denies any nausea vomiting diarrhea  Vital signs reviewed abdomen is soft with left lower quadrant tenderness no guarding or rebound  Impression lower abdominal pain check screening labs CTAP  Lab studies reviewed  Concerns for possible colonic mass chronic thickening diverticulitis will prescribe antibiotics have patient follow-up with PCP and GI as outpatient     Critical Care Time  Procedures

## 2021-03-04 NOTE — PROGRESS NOTES
[facility] - Outpatient Consultation  Debby Ragsdale 67 y o  female MRN: [id number]  Encounter: [id number]          ASSESSMENT AND PLAN:      1  Diverticulitis of large intestine without perforation or abscess without bleeding  Colonoscopy    polyethylene glycol (GLYCOLAX) 17 GM/SCOOP powder   2  Abnormal findings on diagnostic imaging of other abdominal regions, including retroperitoneum  Colonoscopy    polyethylene glycol (GLYCOLAX) 17 GM/SCOOP powder       Will proceed with colonoscopy to rule out any mass lesion adjacent to diverticula that could mimic diverticulitis  She is due for her colonoscopy as well since her last one was over 10 years ago  The rationale for colonoscopy with possible biopsy, possible polypectomy, with IV sedation as well as all risks, benefits, and alternatives were discussed with the patient in detail  Risks including but not limited to perforation, bleeding, need for blood transfusion or emergent surgery, and missed neoplasm were reviewed in detail with the patient  The patient demonstrates full understanding and wishes to proceed  ______________________________________________________________    HPI:  Debby Ragsdale is a 67y o  year old female who presents to the office for evaluation of   Abdominal pain  Patient had abdominal pain several weeks ago and presented to the emergency room  While she was in the emergency room she had CT scan which revealed diverticulitis  Versus focal colitis with mild fat stranding  I have personally viewed the images in PACS  She was given amoxicillin and felt better  After the amoxicillin she has an aching in her left side  She never had any of this before that episode of pain  No blood in stool  No weight loss  No family history of colon cancer  No change in bowel habits  Not on an blood thinners  Last colonoscopy was in 2009, she was told not to come for 10 years and in Fall of 2019    She couldn't get in until much later and then covid hit  They have a past medical history of  Hyperlipidemia, hypertension and follows with their PCP Dr Jacob Brown MD    REVIEW OF SYSTEMS:    CONSTITUTIONAL: Denies any fever, chills, rigors, and weight loss  HEENT: No earache or tinnitus  Denies hearing loss or visual disturbances  CARDIOVASCULAR: No chest pain or palpitations  RESPIRATORY: Denies any cough, hemoptysis, shortness of breath or dyspnea on exertion  GASTROINTESTINAL: As noted in the History of Present Illness  GENITOURINARY: No problems with urination  Denies any hematuria or dysuria  NEUROLOGIC: No dizziness or vertigo, denies headaches  MUSCULOSKELETAL: Denies any muscle or joint pain  SKIN: Denies skin rashes or itching  ENDOCRINE: Denies excessive thirst  Denies intolerance to heat or cold  PSYCHOSOCIAL: Denies depression or anxiety  Denies any recent memory loss         Historical Information   Past Medical History:   Diagnosis Date    Allergic rhinitis     last assessed: 2012    Dysuria 2020    Hypokalemia     last assessed: 2012    Urine frequency 2016     Past Surgical History:   Procedure Laterality Date    COLONOSCOPY  2009    COLPOSCOPY      DIAGNOSTIC LAPAROSCOPY  1987     Social History   Social History     Substance and Sexual Activity   Alcohol Use Yes    Binge frequency: Weekly    Comment: social     Social History     Substance and Sexual Activity   Drug Use No     Social History     Tobacco Use   Smoking Status Former Smoker    Quit date: 12    Years since quittin 1   Smokeless Tobacco Never Used     Family History   Problem Relation Age of Onset    Lung cancer Mother 80    Asthma Family     Coronary artery disease Family     Thyroid disease Family     No Known Problems Father     No Known Problems Daughter     No Known Problems Maternal Grandmother     Cancer Maternal Grandfather         unknown type/age    No Known Problems Paternal Grandmother     No Known Problems Paternal Grandfather     No Known Problems Son     No Known Problems Maternal Aunt     No Known Problems Paternal Aunt        Meds/Allergies       Current Outpatient Medications:     amLODIPine (NORVASC) 2 5 mg tablet    amLODIPine (NORVASC) 5 mg tablet    atorvastatin (LIPITOR) 40 mg tablet    Calcium Carbonate-Vitamin D (CALCIUM 600+D) 600-200 MG-UNIT TABS    fluticasone (FLONASE) 50 mcg/act nasal spray    hydrochlorothiazide (HYDRODIURIL) 25 mg tablet    Klor-Con M20 20 MEQ tablet    loratadine (CLARITIN) 10 mg tablet    losartan (COZAAR) 100 MG tablet    montelukast (SINGULAIR) 10 mg tablet    WIXELA INHUB 100-50 MCG/DOSE inhaler    Allergies   Allergen Reactions    Sulfa Antibiotics Other (See Comments) and Hives     Black spots under skin    Amoxicillin GI Intolerance    Other Allergic Rhinitis           Objective     Blood pressure 138/71, pulse 90, temperature 97 6 °F (36 4 °C), temperature source Tympanic, height 5' 4" (1 626 m), weight 67 5 kg (148 lb 12 8 oz), not currently breastfeeding  Body mass index is 25 54 kg/m²  PHYSICAL EXAM:      General Appearance:   Alert, cooperative, no distress   HEENT:   Normocephalic, atraumatic, anicteric  Lungs:   Equal chest rise and unlabored breathing, normal cough   Heart:   No visualized JVD   Abdomen:   Soft, non-tender, non-distended; no masses, no organomegaly    Genitalia:   Deferred    Rectal:   Deferred    Extremities:  No cyanosis, clubbing or edema    Pulses:  Musculoskeletal:  2+ and symmetric  Normal range of motion visualized    Skin:  Neuro:  No jaundice, rashes, or lesions   Alert and appropriate       Lab Results:   No visits with results within 1 Day(s) from this visit     Latest known visit with results is:   Lab on 03/03/2021   Component Date Value    Sodium 03/03/2021 138     Potassium 03/03/2021 3 8     Chloride 03/03/2021 102     CO2 03/03/2021 26     ANION GAP 03/03/2021 10  BUN 03/03/2021 14     Creatinine 03/03/2021 0 96     Glucose, Fasting 03/03/2021 100*    Calcium 03/03/2021 9 4     eGFR 03/03/2021 58     WBC 03/03/2021 7 15     RBC 03/03/2021 4 86     Hemoglobin 03/03/2021 11 5     Hematocrit 03/03/2021 37 5     MCV 03/03/2021 77*    MCH 03/03/2021 23 7*    MCHC 03/03/2021 30 7*    RDW 03/03/2021 15 5*    Platelets 70/93/6295 322     MPV 03/03/2021 11 3     Hemoglobin A1C 03/03/2021 5 9*    EAG 03/03/2021 123     Cholesterol 03/03/2021 149     Triglycerides 03/03/2021 59     HDL, Direct 03/03/2021 53     LDL Calculated 03/03/2021 84          Radiology Results:   Ct Abdomen Pelvis With Contrast    Result Date: 2/8/2021  Narrative: CT ABDOMEN AND PELVIS WITH IV CONTRAST INDICATION:   Left lower quadrant abdominal pain  Evaluate for diverticulitis  COMPARISON:  None  TECHNIQUE:  CT examination of the abdomen and pelvis was performed  Axial, sagittal, and coronal 2D reformatted images were created from the source data and submitted for interpretation  Radiation dose length product (DLP) for this visit:  772 46 mGy-cm   This examination, like all CT scans performed in the [facility], was performed utilizing techniques to minimize radiation dose exposure, including the use of iterative  reconstruction and automated exposure control  IV Contrast:  100 mL of iohexol (OMNIPAQUE)  was administered intravenously without immediate adverse reaction  Enteric Contrast:  Enteric contrast was not administered  FINDINGS: ABDOMEN LOWER CHEST:  No clinically significant abnormality identified in the visualized lower chest  LIVER/BILIARY TREE:  There are one or more hepatic simple cyst(s) present  No CT evidence of suspicious solid hepatic mass  Normal hepatic contours  No biliary dilatation  GALLBLADDER:  There are gallstone(s) within the gallbladder, without pericholecystic inflammatory changes  SPLEEN:  Calcified granulomata are noted in the spleen  No suspicious splenic mass  PANCREAS:  Unremarkable  ADRENAL GLANDS:  Unremarkable  KIDNEYS/URETERS:  One or more simple renal cyst(s) is noted  Otherwise unremarkable kidneys  No hydronephrosis  STOMACH AND BOWEL:  Small hiatal hernia  No bowel obstruction  There are scattered colonic diverticula  There is focal irregular and lobulated dilatation of the distal transverse colon with eccentric low density and increased enhancement of the bowel wall with surrounding increased vascularity (series 601, image 40)  There are small surrounding mesenteric lymph nodes measuring up to 6 mm in short axis  APPENDIX:  No findings to suggest appendicitis  ABDOMINOPELVIC CAVITY:  No ascites  No pneumoperitoneum  No lymphadenopathy  VESSELS:  Unremarkable for patient's age  PELVIS REPRODUCTIVE ORGANS:  Unremarkable for patient's age  URINARY BLADDER:  Unremarkable  ABDOMINAL WALL/INGUINAL REGIONS:  Small fat-containing umbilical hernia  OSSEOUS STRUCTURES:  No acute fracture or destructive osseous lesion  Grade 1 anterolisthesis of L4 on L5  Impression: 1  Focal increased enhancement of the distal transverse colon with mild surrounding fat stranding may be due to focal colitis versus diverticulitis however eccentric, irregular, and lobulated dilatation and surrounding increased vascularity is suspicious for malignancy  Further evaluation with colonoscopy is recommended to evaluate for malignancy  2   Small hiatal hernia  3   Cholelithiasis without evidence of cholecystitis  4   Other findings as above  The study was marked in [facility] for immediate notification  The study was marked in [facility] for follow-up   Workstation performed: [id number]     Answers for HPI/ROS submitted by the patient on 2/26/2021   Abdominal pain  Chronicity: new  Onset: 1 to 4 weeks ago  Onset quality: sudden  Frequency: every several days  Episode duration: 1 days  Progression since onset: waxing and waning  Pain location: left flank  Pain - numeric: 5/10  Pain quality: aching  Radiates to: left flank  anorexia: No  arthralgias: No  belching: No  constipation: No  diarrhea: No  dysuria: No  fever: No  flatus: No  frequency: No  headaches: No  hematochezia: No  hematuria: No  melena: No  myalgias: No  nausea:  No  weight loss: No  vomiting: No  Aggravated by: being still  Relieved by: certain positions  Diagnostic workup: CT scan

## 2021-03-04 NOTE — PATIENT INSTRUCTIONS
Colon on 4/12/21 with Dr Laureen Hannah at [facility]     Miralax/dulcolax prep instructions given by Natalya Sandoval

## 2021-03-12 NOTE — PROGRESS NOTES
Assessment/Plan:  Continue current meds  Colonoscopy as scheduled  BMI Counseling: Body mass index is 25 47 kg/m²  The BMI is above normal  Exercise recommendations include exercising 3-5 times per week  Incidental CT findings- gall stones, liver and renal cyts, granuloma on spleen and small umbilical hernia discussed     Problem List Items Addressed This Visit        Cardiovascular and Mediastinum    Benign essential hypertension - Primary    Relevant Orders    CBC and differential    Comprehensive metabolic panel       Other    Hyperlipidemia    Relevant Orders    Comprehensive metabolic panel    Lipid Panel with Direct LDL reflex      Other Visit Diagnoses     Colitis                Subjective:      Patient ID: Marques Dunaway is a 67 y o  female  HPI  Here for a follow up  Feeling well overall  She had a bout of colitis in Feb treated with Augmentin  Colonoscopy scheduled in April (last was in 2009)  Recent labs- lipids controlled A1C 5 9  Hgb a little lower at 11 5  Her diet has changes since her daughter moved out so she is not cooking as much for just her and her   She has lost 10lbs since December    The following portions of the patient's history were reviewed and updated as appropriate: allergies, current medications, past family history, past medical history, past social history, past surgical history and problem list     Review of Systems   Constitutional: Negative for chills, fatigue and fever  HENT: Negative for congestion, rhinorrhea and sore throat  Respiratory: Negative for cough, shortness of breath and wheezing  Cardiovascular: Negative for chest pain, palpitations and leg swelling  Gastrointestinal: Negative for abdominal pain, constipation, diarrhea, nausea and vomiting  Genitourinary: Negative for difficulty urinating  Musculoskeletal: Negative for arthralgias and myalgias  Neurological: Negative for dizziness and headaches           Objective:      /66   Pulse 93   Temp 97 5 °F (36 4 °C)   Ht 5' 4" (1 626 m)   Wt 67 3 kg (148 lb 6 4 oz)   LMP  (LMP Unknown)   SpO2 97%   BMI 25 47 kg/m²          Physical Exam  Constitutional:       General: She is not in acute distress  Appearance: She is well-developed  She is not ill-appearing, toxic-appearing or diaphoretic  HENT:      Head: Normocephalic and atraumatic  Eyes:      Conjunctiva/sclera: Conjunctivae normal    Neck:      Musculoskeletal: Neck supple  Cardiovascular:      Rate and Rhythm: Normal rate and regular rhythm  Heart sounds: Normal heart sounds  No murmur  Pulmonary:      Effort: Pulmonary effort is normal  No respiratory distress  Breath sounds: Normal breath sounds  No wheezing or rales  Abdominal:      General: There is no distension  Palpations: Abdomen is soft  There is no mass  Tenderness: There is no abdominal tenderness  There is no guarding or rebound  Musculoskeletal:      Right lower leg: No edema  Left lower leg: No edema  Skin:     General: Skin is warm and dry  Neurological:      Mental Status: She is alert and oriented to person, place, and time  Psychiatric:         Mood and Affect: Mood normal          Behavior: Behavior normal          Thought Content:  Thought content normal          Judgment: Judgment normal

## 2021-04-12 NOTE — ANESTHESIA PREPROCEDURE EVALUATION
Procedure:  COLONOSCOPY    Relevant Problems   CARDIO   (+) Benign essential hypertension   (+) Hyperlipidemia      PULMONARY   (+) Mild persistent asthma without complication        Physical Exam    Airway    Mallampati score: II  TM Distance: >3 FB  Neck ROM: full     Dental       Cardiovascular  Cardiovascular exam normal    Pulmonary  Pulmonary exam normal     Other Findings        Anesthesia Plan  ASA Score- 2     Anesthesia Type- IV sedation with anesthesia with ASA Monitors  Additional Monitors:   Airway Plan:           Plan Factors-Exercise tolerance (METS): >4 METS  Chart reviewed  Existing labs reviewed  Patient is not a current smoker  Patient not instructed to abstain from smoking on day of procedure  Patient did not smoke on day of surgery  Induction-     Postoperative Plan-     Informed Consent- Anesthetic plan and risks discussed with patient  I personally reviewed this patient with the CRNA  Discussed and agreed on the Anesthesia Plan with the CRNA             Lab Results   Component Value Date    HGBA1C 5 9 (H) 03/03/2021       Lab Results   Component Value Date     12/18/2015    K 3 8 03/03/2021     03/03/2021    CO2 26 03/03/2021    ANIONGAP 8 12/18/2015    BUN 14 03/03/2021    CREATININE 0 96 03/03/2021    GLUCOSE 93 12/18/2015    GLUF 100 (H) 03/03/2021    CALCIUM 9 4 03/03/2021    AST 13 08/27/2020    ALT 23 08/27/2020    ALKPHOS 66 08/27/2020    PROT 6 8 12/18/2015    BILITOT 0 40 12/18/2015    EGFR 58 03/03/2021       Lab Results   Component Value Date    WBC 7 15 03/03/2021    HGB 11 5 03/03/2021    HCT 37 5 03/03/2021    MCV 77 (L) 03/03/2021     03/03/2021

## 2021-04-12 NOTE — DISCHARGE INSTRUCTIONS
Colonoscopy   WHAT YOU NEED TO KNOW:   A colonoscopy is a procedure to examine the inside of your colon (intestine) with a scope  Polyps or tissue growths may have been removed during your colonoscopy  It is normal to feel bloated and to have some abdominal discomfort  You should be passing gas  If you have hemorrhoids or you had polyps removed, you may have a small amount of bleeding  DISCHARGE INSTRUCTIONS:   Seek care immediately if:    You have sudden, severe abdominal pain   You have problems swallowing   You have a large amount of black, sticky bowel movements or blood in your bowel movements   You have sudden trouble breathing   You feel weak, lightheaded, or faint or your heart beats faster than normal for you  Contact your healthcare provider if:    You have a fever and chills   You have nausea or are vomiting   Your abdomen is bloated or feels full and hard   You have abdominal pain   You have black, sticky bowel movements or blood in your bowel movements   You have not had a bowel movement for 3 days after your procedure   You have rash or hives   You have questions or concerns about your procedure  Activity:    Do not lift, strain, or run for 24 hours after your procedure   Rest after your procedure  You have been given medicine to relax you  Do not drive or make important decisions until the day after your procedure  Return to your normal activity as directed   Relieve gas and discomfort from bloating by lying on your right side with a heating pad on your abdomen  You may need to take short walks to help the gas move out  Eat small meals until bloating is relieved  Follow up with your healthcare provider as directed: Write down your questions so you remember to ask them during your visits  If you take a blood thinner, please review the specific instructions from your endoscopist about when you should resume it   These can be found in the Recommendation and Your Medication list sections of this After Visit Summary

## 2021-04-12 NOTE — ANESTHESIA POSTPROCEDURE EVALUATION
Post-Op Assessment Note    CV Status:  Stable  Pain Score: 0    Pain management: adequate     Mental Status:  Awake and alert   Hydration Status:  Stable   PONV Controlled:  None   Airway Patency:  Patent and adequate      Post Op Vitals Reviewed: Yes      Staff: CRNA, Anesthesiologist         No complications documented      BP   142/62   Temp     Pulse  75   Resp   16   SpO2   100%

## 2021-04-12 NOTE — H&P
History and Physical -  [facility]  Antonia Sandoval 67 y o  female MRN: [id number]                  HPI: Antonia Sandoval is a 67y o  year old female who presents for colonsocopy      REVIEW OF SYSTEMS: Per the HPI, and otherwise unremarkable      Historical Information   Past Medical History:   Diagnosis Date    Allergic rhinitis     last assessed: 2012    Dysuria 2020    Hypokalemia     last assessed: 2012    Urine frequency 2016     Past Surgical History:   Procedure Laterality Date    COLONOSCOPY  2009    COLPOSCOPY      DIAGNOSTIC LAPAROSCOPY       Social History   Social History     Substance and Sexual Activity   Alcohol Use Yes    Binge frequency: Weekly    Comment: social     Social History     Substance and Sexual Activity   Drug Use No     Social History     Tobacco Use   Smoking Status Former Smoker    Quit date:     Years since quittin 3   Smokeless Tobacco Never Used     Family History   Problem Relation Age of Onset    Lung cancer Mother 80    Asthma Family     Coronary artery disease Family     Thyroid disease Family     No Known Problems Father     No Known Problems Daughter     No Known Problems Maternal Grandmother     Cancer Maternal Grandfather         unknown type/age    No Known Problems Paternal Grandmother     No Known Problems Paternal Grandfather     No Known Problems Son     No Known Problems Maternal Aunt     No Known Problems Paternal Aunt        Meds/Allergies       Current Outpatient Medications:     amLODIPine (NORVASC) 2 5 mg tablet    amLODIPine (NORVASC) 5 mg tablet    atorvastatin (LIPITOR) 40 mg tablet    Calcium Carbonate-Vitamin D (CALCIUM 600+D) 600-200 MG-UNIT TABS    fluticasone (FLONASE) 50 mcg/act nasal spray    hydrochlorothiazide (HYDRODIURIL) 25 mg tablet    Klor-Con M20 20 MEQ tablet    loratadine (CLARITIN) 10 mg tablet    losartan (COZAAR) 100 MG tablet    montelukast (SINGULAIR) 10 mg tablet    Wixela Inhub 100-50 MCG/DOSE inhaler    Current Facility-Administered Medications:     sodium chloride 0 9 % infusion, 50 mL/hr, Intravenous, Continuous    Allergies   Allergen Reactions    Sulfa Antibiotics Other (See Comments) and Hives     Black spots under skin    Amoxicillin GI Intolerance    Other Allergic Rhinitis       Objective     LMP  (LMP Unknown)       PHYSICAL EXAM    Gen: NAD  CV: RRR  CHEST: Clear  ABD: soft, NT/ND  EXT: no edema      ASSESSMENT/PLAN:  This is a 67y o  year old female here for colonoscopy, and she is stable and optimized for her procedure

## 2021-04-14 NOTE — TELEPHONE ENCOUNTER
New Patient GI Form   Patient Details:  Antonia Sandoval  1945  [id number]     Background Information:  [address] starts by opening a telephone encounter and gathering the following information   Who is calling to schedule and relationship?  self  [phone]   To which speciality is the referral?  Medical Oncology   Reason for Visit? New Diagnosis   Tumor Type?  colon mass    Per biopsy Adenocarcinoma   Is there a confimed diagnosis from biopsy/tissue reviewed by Pathology? Yes   Date of Tissue Diagnosis  (If done outside of [facility] please obtain report and slides)  (If no tissue diagnosis, please stop and discuss with Navigator prior to scheduling)  4/12   Is patient aware of the diagnosis? Yes   Has Imaging been completed? Yes   If YES, where was the imaging done? (If outside [place] [address] obtain records)  [facility]   Have any endoscopies been done (colonoscopy, EGD, EUS)  Yes   If YES where were they performed? (If outside of [address] [place] [address] obtain the records)  [facility]   Has blood work been done? Yes    If YES, where was the blood work done? (If outside of [facility] obtain records)  [facility]   Is there a personal history of cancer? (If YES please list type)  No   Is there a family history of cancer? (If YES please list type)  No   Scheduling Information:   Preferred [address] [place] [address], [place]   Are there any days the patient cannot be seen? na   Miscellaneous:  Appt with Dr Lowell Dang on 4/27  CT scheduled on 4/19  Please review chart, will contact patient once chart is reviewed  After completing the above information, please route to finance, nurse navigation and clinical trials for review

## 2021-04-14 NOTE — TELEPHONE ENCOUNTER
Chart reviewed  Path is preliminary  Discussed with Dr Christiana Ott, referring provider  Patient should proceed with surgical evaluation with Dr Lowell Dang first   Med Onc consult would be after surgery, if indicated at that time  Dr Christiana Ott will be calling her later today to review path and recommendations

## 2021-04-15 NOTE — TELEPHONE ENCOUNTER
Called patient and spoke to her with regards to biopsy of colon mass, biopsy is positive for adenocarcinoma  She has CT chest scheduled and will follow up with colorectal surgery and oncology

## 2021-04-15 NOTE — TELEPHONE ENCOUNTER
Pt called stated someone called to go over colon results from 4/12  Pt can be reached at [phone], please follow up

## 2021-04-21 NOTE — PROGRESS NOTES
Attempted to reach patient today  Spoke with her , patient was not home  Left him my contact information and asked that Louis Segura return my call at her soonest convenience

## 2021-04-21 NOTE — TELEPHONE ENCOUNTER
As a follow up, Dr Mason Renner has been in touch with the patient and she is scheduled with Dr Dre Richards on 4/27/21  Another referral will be made to medical oncology if/when needed after consult with Dr Dre Richards  No further scheduling needed with medical oncology at this time, patient has been informed of same

## 2021-04-22 NOTE — PROGRESS NOTES
Phone outreach to patient today  Introduced myself and my role  Explained to patient that referral for medical oncology is deferred to after surgery, pending final pathology  Dr Emmanuel Starks will refer if needed  She has an appointment with Dr Emmanuel Starks on 4/27/21  She stated that she has a trip for one week to [place] starting on 5/1  Advised that surgery would likely be a few weeks after her consult on 4/27  Provided my contact information and encouraged her to call with questions and again reiterated that if medical oncology is needed after her surgery we will get her set up accordingly

## 2021-05-17 PROBLEM — D64.9 ANEMIA: Status: ACTIVE | Noted: 2021-01-01

## 2021-05-26 NOTE — PROGRESS NOTES
Patient tolerated first feraheme without incident  Aware of return appointment next Wednesday and who to call if any problems

## 2021-05-26 NOTE — PLAN OF CARE
Problem: Potential for Falls  Goal: Patient will remain free of falls  Description: INTERVENTIONS:  - Assess patient frequently for physical needs  -  Identify cognitive and physical deficits and behaviors that affect risk of falls    -  Timpson fall precautions as indicated by assessment   - Educate patient/family on patient safety including physical limitations  - Instruct patient to call for assistance with activity based on assessment  - Modify environment to reduce risk of injury  - Consider OT/PT consult to assist with strengthening/mobility  Outcome: Progressing

## 2021-05-28 NOTE — PRE-PROCEDURE INSTRUCTIONS
Pre-Surgery Instructions:   Medication Instructions    amLODIPine (NORVASC) 2 5 mg tablet pt takes in the evening     amLODIPine (NORVASC) 5 mg tablet pt takes in the evening     atorvastatin (LIPITOR) 40 mg tablet pt takes in the evening     Calcium Carbonate-Vitamin D (CALCIUM 600+D) 600-200 MG-UNIT TABS pt instructed to stop prior to surgery     fluticasone (FLONASE) 50 mcg/act nasal spray pt instructed to use on day of surgery     hydrochlorothiazide (HYDRODIURIL) 25 mg tablet pt instructed to not take on day of surgery     Klor-Con M20 20 MEQ tablet pt instructed to not take on day of surgery     loratadine (CLARITIN) 10 mg tablet pt instructed to not take on day of surgery     losartan (COZAAR) 100 MG tablet pt instructed to not take on day of surgery     montelukast (SINGULAIR) 10 mg tablet pt takes at hs     Omega-3 Fatty Acids (Fish Oil) 1200 MG CAPS pt instructed to stop prior to surgery     Wixela Inhub 100-50 MCG/DOSE inhaler pt instructed to use on day of surgery and to bring to the hospital with her    Pt denies fever, sob, sore throat and cough  Pt verbalized understanding of shower and [facility] visitor instructions  Pt instructed to stop nsaids and supplements prior to surgery  Pt received bowel cleansing and day before antibiotic use instructions from surgeons offce and pt does not need to have second iron infusion next week due to date of surgery being moved up

## 2021-06-01 NOTE — TELEPHONE ENCOUNTER
Spoke to her about abnormal preop EKG performed on 5/28  It shows   NSR   LAD  Incomplete RBBB  Inferior infarct, age undetermined  Cannot rule out anterior infarct, age undetermined    There is no old EKG to compare this  She denies chest pain, palpitations  She has good exercise tolerance  She walks, goes up and down stairs without difficulty and does Pilates weekly  She does experience SOB at times which she attributes to asthma  She has controlled HTN on losartan HCTZ and amlodipine and controlled lipids on atorvastatin  We discussed that she is medically stable for the surgery which she too does not want to postpone any longer  Form to be sent to Dr Adrien Panchal office

## 2021-06-03 NOTE — H&P
History and Physical   Colon and Rectal Surgery   Mehreen Cullen 67 y o  female MRN: [id number]  Unit/Bed#: OR POOL Encounter: [id number]  06/03/21   11:56 AM      No chief complaint on file  ASSESSMENT:    Distal transverse/descending colon adenocarcinoma  CT scan C/A/P, goiter with left tracheal devation, no signs of metastatic disease     We discussed laparoscopic possible open left hemicolectomy in a face-to-face, personal, informed consent process, the benefits, alternatives, risks including not limited to bleeding, infection, risks of anesthesia, open surgery, DVT/PE, heart attack, stroke, death, damage to local structures(e g  ureter, duodenum),anastomotic leak requiring reoperation, temporary versus permanent stoma  They understood these risks, signed informed consent, and wish to proceed         PLAN:  Laparoscopic possible open left hemicolectomy to be scheduled  CBC/CMP/TypeScreen/CEA ordered  PCP clearance question of bradycardia episodes on colonoscopy documented  CC:  Dr Neli Dasilva, RN      HPI  Mehreen Cullen is a 67 y o  female referred for evaluation today by Dr Adriel Schaffer, for colonic mass       She complains of an occasional "twinge" on the lower left quadrant  She denies any change in bowel habits or rectal bleeding       Her last colonoscopy was 3/4/2021, with Dr Cassy Gandhi, which revealed 7 cm mass in the distal transverse/descending colon; this was biopsied with cold biopsy forceps  2 polyps in the sigmoid colon removed with cold snare polypectomy  Hemorrhoids  Of note, patient had episodes of bradycardia during the exam which spontaneously recovered       Colonoscopy pathology reported:   A  Colon, Ascending colon, Polypectomy:  - Tubular adenoma  - No high grade dysplasia and no evidence of malignancy       B   Colon, Descending colon mass  37 cm, Biopsy:  - Adenocarcinoma      Note: Ancillary testing for mismatch repair (MMR) protein deficiency by immunohistochemical panel (MLH1, PMS2, MSH2, MSH6) is undertaken (Block B1); the results will be issued in a supplemental report, to follow shortly      C  Colon, Sigmoid polyp, Polypectomy:  - Colonic mucosa with reactive changes    - No epithelial dysplasia and no evidence of malignancy      Addendum  RESULTS OF IMMUNOHISTOCHEMICAL ANALYSIS FOR MISMATCH REPAIR PROTEIN LOSS     INTERPRETATION: 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, see comment      RESULTS:  Antibody            Clone                 Description                     Results  JFW9                 H7                     Mismatch repair protein  Loss of nuclear expression  MSH2                [id number]        Mismatch repair protein  Intact nuclear expression  MSH6                44                      Mismatch repair protein  Intact nuclear expression  PMS2                 [id number]           Mismatch repair protein  Loss of nuclear expression     Comment:   Loss of nuclear expression of MLH1 and PMS2 present: testing for methylation of the MLH1 promoter and/or mutation of BRAF is indicated (the presence of a BRAF V600E mutation and/or MLH1 methylation suggests that the tumor is sporadic and germline evaluation is probably not indicated; absence of both MLH1 methylation and of BRAF V600E mutation suggests the possibility of Costello syndrome, and sequencing and/or large deletion/duplication testing of germline MLH1 may be indicated)     A positive control for each antibody have been reviewed and accepted      [facility] Specimen ID: [id number] on block B1  Evaluator: RUBIN Gan      These tests were developed and their performance characteristics determined by [facility]  Srinivasa Young may not be cleared or approved by the [place]   Food and Drug Administration   The FDA determined that such clearance or approval is not necessary   These tests are used for clinical purposes  Srinivasa Young should not be regarded as investigational or for research   This laboratory has been approved by [place] , designated as a high-complexity laboratory and is qualified to perform these tests      Comments: Patients whose tumors demonstrate lack of expression of one or more DNA mismatch repair proteins might be at risk for Costello Syndrome  This cancer susceptibility syndrome greatly increases the risk of synchronous and/or metachronous cancers in the affected patients and their family members  Normal expression of all proteins does not completely rule out familial cancer predisposition      The [facility] Costello Syndrome Surveillance Program Task Forces recommends that all patients with lack of expression of one or more DNA mismatch repair proteins and those with concerning personal or family history should undergo thorough evaluation, counseling and possibly genetic testing      CT chest abdomen and pelvis on 4/19/2021, showed no metastatic disease in the chest   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 extension into the mediastinum, and associated leftward tracheal deviation   This was last evaluated with ultrasound on 6/1/2020       Historical Information   Past Medical History:   Diagnosis Date    Allergic rhinitis     last assessed: 12/12/2012    Asthma     Dysuria 5/14/2020    Hyperlipidemia     Hypertension     Hypokalemia     last assessed: 12/14/2012    Urine frequency 5/25/2016     Past Surgical History:   Procedure Laterality Date    COLONOSCOPY  2009    COLPOSCOPY      DIAGNOSTIC LAPAROSCOPY  1987    TUBAL LIGATION         Meds/Allergies     Medications Prior to Admission   Medication    amLODIPine (NORVASC) 2 5 mg tablet    amLODIPine (NORVASC) 5 mg tablet    atorvastatin (LIPITOR) 40 mg tablet    Calcium Carbonate-Vitamin D (CALCIUM 600+D) 600-200 MG-UNIT TABS    fluticasone (FLONASE) 50 mcg/act nasal spray    hydrochlorothiazide (HYDRODIURIL) 25 mg tablet    Klor-Con M20 20 MEQ tablet    loratadine (CLARITIN) 10 mg tablet    losartan (COZAAR) 100 MG tablet    montelukast (SINGULAIR) 10 mg tablet    Omega-3 Fatty Acids (Fish Oil) 1200 MG CAPS    Wixela Inhub 100-50 MCG/DOSE inhaler         Current Facility-Administered Medications:     ceFAZolin (ANCEF) IVPB (premix in dextrose) 1,000 mg 50 mL, 1,000 mg, Intravenous, Once **AND** metroNIDAZOLE (FLAGYL) IVPB (premix) 500 mg 100 mL, 500 mg, Intravenous, Once, Dony Patton MD    heparin (porcine) subcutaneous injection 5,000 Units, 5,000 Units, Subcutaneous, Once, Dony Patton MD    lactated ringers infusion, 125 mL/hr, Intravenous, Continuous, Alfonso Dc MD, Last Rate: 125 mL/hr at 21 1140, 125 mL/hr at 21 1140    Allergies   Allergen Reactions    Sulfa Antibiotics Other (See Comments) and Hives     Black spots under skin    Amoxicillin GI Intolerance    Other Allergic Rhinitis         Social History   Social History     Substance and Sexual Activity   Alcohol Use Yes    Alcohol/week: 2 0 standard drinks    Types: 2 Glasses of wine per week    Frequency: 2-3 times a week    Drinks per session: 1 or 2    Binge frequency: Never     Social History     Substance and Sexual Activity   Drug Use No     Social History     Tobacco Use   Smoking Status Former Smoker    Quit date:     Years since quittin 4   Smokeless Tobacco Never Used         Family History:   Family History   Problem Relation Age of Onset    Lung cancer Mother 80    Asthma Family     Coronary artery disease Family     Thyroid disease Family     No Known Problems Father     No Known Problems Daughter     No Known Problems Maternal Grandmother     Cancer Maternal Grandfather         unknown type/age    No Known Problems Paternal Grandmother     No Known Problems Paternal Grandfather     No Known Problems Son     No Known Problems Maternal Aunt     No Known Problems Paternal Aunt          Objective     Current Vitals:   Blood Pressure: 136/75 (06/03/21 1107)  Pulse: 83 (06/03/21 1107)  Temperature: 98 1 °F (36 7 °C) (06/03/21 1107)  Temp Source: Temporal (06/03/21 1107)  Respirations: 18 (06/03/21 1107)  Height: 5' 4" (162 6 cm) (06/03/21 1107)  Weight - Scale: 64 9 kg (143 lb) (06/03/21 1107)  SpO2: 97 % (06/03/21 1107)  No intake or output data in the 24 hours ending 06/03/21 1156    Physical Exam:  General:no distress  Eyes:perrla/eomi  ENT:moist mucus membranes  Neck:supple  Pulm:no increased work of breathing, clear bilateral  CV:sinus  Abdomen:soft,nontender  Extremities:no edema

## 2021-06-03 NOTE — OP NOTE
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]    Procedure(s) (LRB):  -Diagnostic laparoscopy  -Laparoscopic hand assist splenic flexure mobilization  -Laparoscopic hand assist extended left hemicolectomy with transverse to rectal EEA 29 anastomosis  -Spy intraop fluorescence angiography  -Intraoperative flexible sigmoidoscopy  -Partial omentectomy as well as en bloc abdominal wall excision    Specimen(s):  ID Type Source Tests Collected by Time Destination   1 : EXTENDED LEFT HEMICOLECTOMY Tissue Large Intestine, Sigmoid Colon TISSUE EXAM Delmy Gutierrez MD 6/3/2021 1511      Estimated Blood Loss:   Minimal    Drains:  Urethral Catheter Non-latex;Straight-tip 16 Fr  (Active)   Number of days: 0     Anesthesia Type:   General    Operative Indications:  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]    Operative Findings:  -large distal transverse colon cancer with adherent omentum, phlegmon adherent to abdominal wall requiring abdominal wall excision EN bloc with specimen  -Normal diagnostic laparoscopy without visualized hepatic lesion other than cysts, or peritoneal lesion other than tumor phlegmon  -EEA 29 colorectal anastomosis to 15cm  -Negative bubble leak test, intact anastomotic rings, good blood supply to transverse pedicle by SPY     Complications:   None     Procedure and Technique:   Minh Pedroza is a 67yo female recently diagnosed distal transverse/descending colon adenocarcinoma      We discussed laparoscopic possible open left hemicolectomy in a face-to-face, personal, informed consent process, the benefits, alternatives, risks including not limited to bleeding, infection, risks of anesthesia, open surgery, DVT/PE, heart attack, stroke, death, damage to local structures(e g  ureter, duodenum),anastomotic leak requiring reoperation, temporary versus permanent stoma  They understood these risks, signed informed consent, and wish to proceed       She was brought to the operating room where general endotracheal anesthesia was induced without event  Viveros was placed under sterile conditions,placed in modified lithotomy position with attention to joints and bony extremities,received 5000 units of subcutaneous heparin prior to operation, Venodynes were on and running throughout the procedure,received cefazolin and Flagyl prior to skin incision      She was bottom betadine prepped, abdomen chlorhexidine sterile prepped after electric clipping, and after appropriate drying time [place] and sterile draped  After timeout was taken per protocol procedure began       5mm port was placed in the right upper quadrant after incision with 15 blade, placed with optiview technique visualizing all layers of abdomen, entering peritoneal cavity without injury, 15mm CO2 pneumoperitoneum introduced      Diagnostic laparoscopy revealed no obvious peritoneal or liver disease  Additional 5 mm trocar placed in the right lower quadrant  Obvious large phlegmon to the abdominal wall with adherence, at this point decided given this finding to immediately place GelPort/wound protector  8 cm transverse incision suprapubic in a Pfannenstiel fashion  Subcutaneous tissues were entered with electrocautery to the fascia  The fascia was incised transversely and scored avoiding the rectus sheath  Fascial flaps were raised off of the rectus sheath musculature   The sheath was entered in the midline exposing the peritoneal cavity which was entered      GelPort device was placed and 15 mm CO2 pneumoperitoneum was insufflated       Using hand assistance, began at the mid transverse colon, entering the lesser sac along the omental attachments, with clear views posterior to stomach, in this fashion the omentum was  from the part that was adherent to the abdominal wall so that it could be taken EN bloc, dissecting along the curvature of the stomach and avoiding the gastroepiploic artery  Once this was completed and approached the splenic flexure from this proximal side the abdominal wall was incised to get completely around the tumor adherence, this was adherent into the muscular wall and using EnSeal energy device this was dissected free until there were clear planes and single circular paddle of abdominal wall was left on the specimen  Then continued from the distal side raising the left colon, locating the duodenum and the inferior mesenteric vein which was raised, ureter and retroperitoneal structures swept away in the medial-lateral fashion, clips were placed on the IMV and taken in a double burn of the EnSeal energy device, the mediolateral dissection was completed all the way up to the splenic flexure prior to incising the lateral white line of [place] to completely medialize splenic flexure  Continued on the descending colon, left colic artery was also taken EN route to the rectum as she had induration/diverticular disease of the sigmoid, and judged better to plan anastomosis to the rectum where the tenia splay  Note that the left colic was taken in a high/central ligation, as well as the middle colic, as well as the remaining mesentery in a complete mesocolon excision, endoloop PDS was placed for hemostasis at middle colic  GelPort lid was opened leaving wound protector in place, blue load contour was taken at the proximal transverse, green load contour act the rectum, 2 0 Vicryl sutures were used to over-sew the corners for hemostasis      On evaluation of mobilization and length it was apparent that we would need to complete the hepatic flexure mobilization to rotate the remaining right colon into the pelvis which was performed under repeat hand assistance and insufflation, mobilizing all the retroperitoneal attachments away until the transverse colon rotated completely in the pelvis without tension  The SPY fluorescence angiography was also brought onto the field after being sterilely prepped  The transverse colon for proposed anastomosis was prepped with focused lasers and indocyanine green injected by anesthesia  Fluorescence angiography was then performed with lights off and showed good blood flow to the distal segment  This was incised proximal to the end where there was some mild perfusion defect      The 2-0 Prolene pursestring suture was then placed and the EEA 29 anvil was placed and before being doubly tied  Note that the small bowel and its mesentery was brought completely into the left side of the abdomen so that the right-sided colon could rotate down the right hemiabdomen into the pelvis      I then went below as perineal  placed the EEA 29 handle after placing sizers to the proximal rectum  Once this was deployed with pin through the middle of the staple line with orange flash showing, above  with hand assistance placed anvil with audible click and without twist   Closed and fired after appropriate compression time  The above  then placed irrigation and proximal colon finger clamp in pelvis while I placed flexible sigmoidoscope while pelvis was filled with irrigation  The anastomosis was visualized at approximately 15 cm with good circumferential bleeding points and no hemorrhage  This was insufflated and desufflated and 3 cycles with no bubbles and good insufflation and desufflation   I then went above after changing gown and gloves as per the colon bundle, irrigation undertaken and ran clean       Redraped the bottom, changed gown and gloves to provide a clean field for closure which was undertaken      Fascia was then closed after correct counts in the following fashion, peritoneum was closed vertically with running locked 2-0 Vicryl suture  This was followed by 0 Vicryl interrupted sutures to reapproximate the rectus sheath  Followed by #1 PDS from either side of the fascial closure   4-0 Monocryl on the skin with Histoacryl for the incision and remaining port site after additional glove change      All sponge needle instrument, RF Wand counts were correct I was present and scrubbed for the entirety of the procedure     Patient Disposition:  PACU     SIGNATURE: Anamaria Gillette MD  DATE: Johanna 3, 2021  TIME: 4:32 PM

## 2021-06-03 NOTE — ANESTHESIA PREPROCEDURE EVALUATION
Procedure:  RESECTION COLON LEFT LAPAROSCOPIC (N/A Abdomen)    Relevant Problems   CARDIO   (+) Benign essential hypertension   (+) Hyperlipidemia      HEMATOLOGY   (+) Anemia      PULMONARY   (+) Mild persistent asthma without complication        Physical Exam    Airway    Mallampati score: II  TM Distance: >3 FB  Neck ROM: full     Dental       Cardiovascular  Cardiovascular exam normal    Pulmonary  Pulmonary exam normal     Other Findings      Allergic rhinitis last assessed: 12/12/2012   Hypokalemia last assessed: 12/14/2012   Urine frequency    Dysuria    Hypertension    Hyperlipidemia    Asthma        Anesthesia Plan  ASA Score- 2     Anesthesia Type- general with ASA Monitors  Additional Monitors:   Airway Plan: ETT  Comment: Left tracheal deviation  Anamaria Search  Plan Factors-Exercise tolerance (METS): >4 METS  Chart reviewed  EKG reviewed  Imaging results reviewed  Existing labs reviewed  Patient summary reviewed  Induction- intravenous  Postoperative Plan- Plan for postoperative opioid use  Planned trial extubation    Informed Consent- Anesthetic plan and risks discussed with patient  I personally reviewed this patient with the CRNA  Discussed and agreed on the Anesthesia Plan with the CRNA  Gerhardt Sep

## 2021-06-03 NOTE — QUICK NOTE
Postoperative check  Patient seen following left extended hemicolectomy with intraoperative flexible sigmoidoscopy, partial omentectomy with EN bloc abdominal wall excision  VSS, afebrile  400 cc urine output via Viveros catheter clear yellow    Exam  General:  No acute distress  HEENT:  Normocephalic, atraumatic  Cardiovascular:  Regular rate and rhythm  Respiratory:  No distress, respiring comfortably on room air, bilateral breath sounds  Abdomen:  Soft, appropriately tender, nondistended, incisions C/D/I  :  Viveros catheter in place draining clear yellow urine  Extremities:  No clubbing, cyanosis, or edema  Neuro:  AAO x3  Skin:  Warm, dry     Assessment  67 F with colonic adenocarcinoma now status post laparoscopic extended left hemicolectomy with on bloc resection of abdominal wall tissue and partial omentectomy, intraoperative flexible sigmoidoscopy      Plan  -maintain NPO  -IV fluid hydration  -pain control, Dilaudid PCA  -maintain Viveros catheter  -apply ice to incision  -ambulation out of bed as able  DVT prophylaxis

## 2021-06-03 NOTE — ANESTHESIA POSTPROCEDURE EVALUATION
Post-Op Assessment Note    CV Status:  Stable  Pain Score: 0    Pain management: adequate     Mental Status:  Sleepy   Hydration Status:  Euvolemic   PONV Controlled:  Controlled   Airway Patency:  Patent      Post Op Vitals Reviewed: Yes      Staff: CRNA         No complications documented      BP   135/57   Temp 97 4   Pulse 66   Resp 16   SpO2 97

## 2021-06-04 PROBLEM — C18.9 COLON ADENOCARCINOMA (HCC): Status: ACTIVE | Noted: 2021-01-01

## 2021-06-04 NOTE — PLAN OF CARE
Problem: PAIN - ADULT  Goal: Verbalizes/displays adequate comfort level or baseline comfort level  Description: Interventions:  - Encourage patient to monitor pain and request assistance  - Assess pain using appropriate pain scale  - Administer analgesics based on type and severity of pain and evaluate response  - Implement non-pharmacological measures as appropriate and evaluate response  - Consider cultural and social influences on pain and pain management  - Notify physician/advanced practitioner if interventions unsuccessful or patient reports new pain  Outcome: Progressing     Problem: INFECTION - ADULT  Goal: Absence or prevention of progression during hospitalization  Description: INTERVENTIONS:  - Assess and monitor for signs and symptoms of infection  - Monitor lab/diagnostic results  - Monitor all insertion sites, i e  indwelling lines, tubes, and drains  - Monitor endotracheal if appropriate and nasal secretions for changes in amount and color  - [place] appropriate cooling/warming therapies per order  - Administer medications as ordered  - Instruct and encourage patient and family to use good hand hygiene technique  - Identify and instruct in appropriate isolation precautions for identified infection/condition  Outcome: Progressing  Goal: Absence of fever/infection during neutropenic period  Description: INTERVENTIONS:  - Monitor WBC    Outcome: Progressing     Problem: SAFETY ADULT  Goal: Patient will remain free of falls  Description: INTERVENTIONS:  - Assess patient frequently for physical needs  -  Identify cognitive and physical deficits and behaviors that affect risk of falls    -  [place] fall precautions as indicated by assessment   - Educate patient/family on patient safety including physical limitations  - Instruct patient to call for assistance with activity based on assessment  - Modify environment to reduce risk of injury  - Consider OT/PT consult to assist with strengthening/mobility  Outcome: Progressing  Goal: Maintain or return to baseline ADL function  Description: INTERVENTIONS:  -  Assess patient's ability to carry out ADLs; assess patient's baseline for ADL function and identify physical deficits which impact ability to perform ADLs (bathing, care of mouth/teeth, toileting, grooming, dressing, etc )  - Assess/evaluate cause of self-care deficits   - Assess range of motion  - Assess patient's mobility; develop plan if impaired  - Assess patient's need for assistive devices and provide as appropriate  - Encourage maximum independence but intervene and supervise when necessary  - Involve family in performance of ADLs  - Assess for home care needs following discharge   - Consider OT consult to assist with ADL evaluation and planning for discharge  - Provide patient education as appropriate  Outcome: Progressing  Goal: Maintain or return mobility status to optimal level  Description: INTERVENTIONS:  - Assess patient's baseline mobility status (ambulation, transfers, stairs, etc )    - Identify cognitive and physical deficits and behaviors that affect mobility  - Identify mobility aids required to assist with transfers and/or ambulation (gait belt, sit-to-stand, lift, walker, cane, etc )  - [place] fall precautions as indicated by assessment  - Record patient progress and toleration of activity level on Mobility SBAR; progress patient to next Phase/Stage  - Instruct patient to call for assistance with activity based on assessment  - Consider rehabilitation consult to assist with strengthening/weightbearing, etc   Outcome: Progressing     Problem: DISCHARGE PLANNING  Goal: Discharge to home or other facility with appropriate resources  Description: INTERVENTIONS:  - Identify barriers to discharge w/patient and caregiver  - Arrange for needed discharge resources and transportation as appropriate  - Identify discharge learning needs (meds, wound care, etc )  - Arrange for interpretive services to assist at discharge as needed  - Refer to Case Management Department for coordinating discharge planning if the patient needs post-hospital services based on physician/advanced practitioner order or complex needs related to functional status, cognitive ability, or social support system  Outcome: Progressing     Problem: Knowledge Deficit  Goal: Patient/family/caregiver demonstrates understanding of disease process, treatment plan, medications, and discharge instructions  Description: Complete learning assessment and assess knowledge base    Interventions:  - Provide teaching at level of understanding  - Provide teaching via preferred learning methods  Outcome: Progressing

## 2021-06-04 NOTE — PLAN OF CARE
Problem: PAIN - ADULT  Goal: Verbalizes/displays adequate comfort level or baseline comfort level  Description: Interventions:  - Encourage patient to monitor pain and request assistance  - Assess pain using appropriate pain scale  - Administer analgesics based on type and severity of pain and evaluate response  - Implement non-pharmacological measures as appropriate and evaluate response  - Consider cultural and social influences on pain and pain management  - Notify physician/advanced practitioner if interventions unsuccessful or patient reports new pain  Outcome: Progressing     Problem: INFECTION - ADULT  Goal: Absence or prevention of progression during hospitalization  Description: INTERVENTIONS:  - Assess and monitor for signs and symptoms of infection  - Monitor lab/diagnostic results  - Monitor all insertion sites, i e  indwelling lines, tubes, and drains  - Monitor endotracheal if appropriate and nasal secretions for changes in amount and color  - [place] appropriate cooling/warming therapies per order  - Administer medications as ordered  - Instruct and encourage patient and family to use good hand hygiene technique  - Identify and instruct in appropriate isolation precautions for identified infection/condition  Outcome: Progressing  Goal: Absence of fever/infection during neutropenic period  Description: INTERVENTIONS:  - Monitor WBC    Outcome: Progressing     Problem: SAFETY ADULT  Goal: Patient will remain free of falls  Description: INTERVENTIONS:  - Assess patient frequently for physical needs  -  Identify cognitive and physical deficits and behaviors that affect risk of falls    -  [place] fall precautions as indicated by assessment   - Educate patient/family on patient safety including physical limitations  - Instruct patient to call for assistance with activity based on assessment  - Modify environment to reduce risk of injury  - Consider OT/PT consult to assist with strengthening/mobility  Outcome: Progressing  Goal: Maintain or return to baseline ADL function  Description: INTERVENTIONS:  -  Assess patient's ability to carry out ADLs; assess patient's baseline for ADL function and identify physical deficits which impact ability to perform ADLs (bathing, care of mouth/teeth, toileting, grooming, dressing, etc )  - Assess/evaluate cause of self-care deficits   - Assess range of motion  - Assess patient's mobility; develop plan if impaired  - Assess patient's need for assistive devices and provide as appropriate  - Encourage maximum independence but intervene and supervise when necessary  - Involve family in performance of ADLs  - Assess for home care needs following discharge   - Consider OT consult to assist with ADL evaluation and planning for discharge  - Provide patient education as appropriate  Outcome: Progressing  Goal: Maintain or return mobility status to optimal level  Description: INTERVENTIONS:  - Assess patient's baseline mobility status (ambulation, transfers, stairs, etc )    - Identify cognitive and physical deficits and behaviors that affect mobility  - Identify mobility aids required to assist with transfers and/or ambulation (gait belt, sit-to-stand, lift, walker, cane, etc )  - [place] fall precautions as indicated by assessment  - Record patient progress and toleration of activity level on Mobility SBAR; progress patient to next Phase/Stage  - Instruct patient to call for assistance with activity based on assessment  - Consider rehabilitation consult to assist with strengthening/weightbearing, etc   Outcome: Progressing     Problem: DISCHARGE PLANNING  Goal: Discharge to home or other facility with appropriate resources  Description: INTERVENTIONS:  - Identify barriers to discharge w/patient and caregiver  - Arrange for needed discharge resources and transportation as appropriate  - Identify discharge learning needs (meds, wound care, etc )  - Arrange for interpretive services to assist at discharge as needed  - Refer to Case Management Department for coordinating discharge planning if the patient needs post-hospital services based on physician/advanced practitioner order or complex needs related to functional status, cognitive ability, or social support system  Outcome: Progressing     Problem: Knowledge Deficit  Goal: Patient/family/caregiver demonstrates understanding of disease process, treatment plan, medications, and discharge instructions  Description: Complete learning assessment and assess knowledge base    Interventions:  - Provide teaching at level of understanding  - Provide teaching via preferred learning methods  Outcome: Progressing

## 2021-06-04 NOTE — CASE MANAGEMENT
LOS 21 hours No bundle No readmit    Pt lives in [address] [facility]  Independent Pta  Pt drives  Lives with her  who "Helps her at home"  No DME  Family will transport home  Uses [facility] on [address]  Pt drives  NO Hx MH or substance issues  No Hx of STR or VNA  CM reviewed d/c planning process including the following: identifying help at home, patient preference for d/c planning needs, Discharge Lounge, [facility] Meds to Bed program, availability of treatment team to discuss questions or concerns patient and/or family may have regarding understanding medications and recognizing signs and symptoms once discharged  CM also encouraged patient to follow up with all recommended appointments after discharge  Patient advised of importance for patient and family to participate in managing patients medical well being

## 2021-06-04 NOTE — UTILIZATION REVIEW
Inpatient Admission Authorization Request   NOTIFICATION OF INPATIENT ADMISSION/INPATIENT AUTHORIZATION REQUEST   SERVICING FACILITY:   [facility]  Address: [address], [address] [facility] [address]  Tax ID: [id number]  NPI: [id number]  Place of Service: Inpatient [address] [facility] Code: 24     ATTENDING PROVIDER:  Attending Name and NPI#: Junior Tan Md [[id number]]  Address: [address] [place] [address], [address] [place] [id number]  Phone: [phone]     UTILIZATION REVIEW CONTACT:  Gwenette Ormond, Utilization   Network Utilization Review Department  Phone: [phone]  Fax: [phone]  Email: [email or web address]     PHYSICIAN ADVISORY SERVICES:  FOR EFLB-UO-CQYA REVIEW - MEDICAL NECESSITY DENIAL  Phone: [phone]  Fax: [phone]  Email: [email or web address]     TYPE OF REQUEST:  Inpatient Status     ADMISSION INFORMATION:  ADMISSION DATE/TIME: 6/3/21 1635  PATIENT DIAGNOSIS CODE/DESCRIPTION:  Adenocarcinoma of transverse colon ([facility] [address] ) [C18 4]  DISCHARGE DATE/TIME: No discharge date for patient encounter  DISCHARGE DISPOSITION (IF DISCHARGED): Home/Self Care     IMPORTANT INFORMATION:  Please contact the Gwenette Ormond directly with any questions or concerns regarding this request  Department voicemails are confidential     Send requests for admission clinical reviews, concurrent reviews, approvals, and administrative denials due to lack of clinical to fax [phone]

## 2021-06-04 NOTE — OCCUPATIONAL THERAPY NOTE
Occupational Therapy Evaluation     Patient Name: Jayjay Patrick  WJCZH'G Date: 6/4/2021  Problem List  Principal Problem:    Colon adenocarcinoma [facility])  Active Problems:    Anemia    Past Medical History  Past Medical History:   Diagnosis Date    Allergic rhinitis     last assessed: 12/12/2012    Asthma     Dysuria 5/14/2020    Hyperlipidemia     Hypertension     Hypokalemia     last assessed: 12/14/2012    Urine frequency 5/25/2016     Past Surgical History  Past Surgical History:   Procedure Laterality Date    COLONOSCOPY  2009    COLPOSCOPY      DIAGNOSTIC LAPAROSCOPY  1987    TUBAL LIGATION             06/04/21 1156   OT Last Visit   OT Visit Date 06/04/21   Note Type   Note type Evaluation   Restrictions/Precautions   Weight Bearing Precautions Per Order No   Other Precautions Multiple lines;Pain  (PCA pump)   Pain Assessment   Pain Assessment Tool 0-10   Pain Score 8   Pain Location/Orientation Location: Abdomen   Hospital Pain Intervention(s) Ambulation/increased activity; Medication (See MAR); Repositioned   Home Living   Type of [address] [place] [address] Two level;1/2 bath on main level;Bed/bath upstairs   Bathroom Shower/Tub Walk-in shower   Bathroom Toilet Standard   Bathroom Equipment   (denies )   Home Equipment   (denies)   Prior Function   Level of [place] Independent with ADLs and functional mobility   Lives With Spouse   Receives Help From Family   ADL Assistance Independent   IADLs Independent   Falls in the last 6 months 0   Vocational Retired   Lifestyle   Autonomy PTA pt was I with ADLs/IADLs   Reciprocal Relationships     Service to Others Retired   Intrinsic [employer]atification Pilates classes, going to [employer]   Psychosocial   Psychosocial (WDL) WDL   Subjective   Subjective "I like to stay active"   ADL   Where Assessed Chair   Eating Assistance 7  Independent   Grooming Assistance 7  Independent   UB Bathing Assistance 7  Independent   LB Bathing Assistance 5 Supervision/Setup   UB Dressing Assistance 7  Independent   LB Dressing Assistance 5  Supervision/Setup   Toileting Assistance  5  Supervision/Setup   Bed Mobility   Supine to Sit 6  Modified independent   Additional items HOB elevated   Sit to Supine Unable to assess   Transfers   Sit to Stand 6  Modified independent   Stand to Sit 6  Modified independent   Functional Mobility   Functional Mobility 5  Supervision   Additional Comments No AD    Balance   Static Sitting Good   Dynamic Sitting Fair +   Static Standing Fair +   Dynamic Standing Fair   Ambulatory Fair   Activity Tolerance   Activity Tolerance Patient tolerated treatment well   Medical Staff Made Aware PT Angle    Nurse Made Aware RN clearance for session    RUE Assessment   RUE Assessment WFL   LUE Assessment   LUE Assessment WFL   Hand Function   Gross Motor Coordination Functional   Fine Motor Coordination Functional   Sensation   Light Touch No apparent deficits   Vision-Basic Assessment   Current Vision Wears glasses all the time   Vision - Complex Assessment   Ocular Range of Motion WFL   Head Position WDL   Tracking Able to track stimulus in all quads without difficulty   Perception   Inattention/Neglect Appears intact   Cognition   Overall Cognitive Status [facility]   Arousal/Participation Alert; Responsive; Cooperative   Attention Within functional limits   Orientation Level Oriented X4   Memory Within functional limits   Following Commands Follows all commands and directions without difficulty   Comments Pt very pleasant and cooperative t/o session    Assessment   Assessment Pt is a 67 y o  female admitted to [facility] on 6/3/2021 w/ Colon adenocarcinoma ([address] )  Pt s/p "lap  left extended hemicolectomy with intraoperative flexible sigmoidoscopy, partial omentectomy with EN bloc abdominal wall excision "  has a past medical history of Allergic rhinitis, Asthma, Dysuria (5/14/2020), Hyperlipidemia, Hypertension, Hypokalemia, and Urine frequency (5/25/2016)  Pt with active OT orders and up with assistance  orders  Pt seen as a co-evaluation with PT due to the patient's co-morbidities, clinical complexity, and present impairments  As per pt report, pta, resides in a 2STH, 0STE with her   Pt was I w/  ADLS and IADLS, (+) drove  Upon evaluation, pt currently requires MI for transfers and S for mobility  Pt currently independent for eating, grooming, UB ADLs, S LB ADLs, and S toileting  Pt with G activity tolerance  Pt reports  can assist with tasks at home  Has no further questions or concerns  From OT standpoint, recommendation would be home with social support as needed  No further acute OT needs  D/C OT  Please re-consult if needed  Thank you  Pt was left in chair  after session with all current needs met  The patient's raw score on the AM-PAC Daily Activity inpatient short form is 21, standardized score is 44 27, greater than 39 4  Patients at this level are likely to benefit from discharge to home  Please refer to the recommendation of the Occupational Therapist for safe discharge planning     Goals   Patient Goals To get back to her [employer] classes and go on vacation with her family    Plan   OT Frequency Eval only   Recommendation   OT Discharge Recommendation No rehabilitation needs  (home with social support )   OT - OK to Discharge Yes  (when medically stable )   AM-PAC Daily Activity Inpatient   Lower Body Dressing 3   Bathing 3   Toileting 3   Upper Body Dressing 4   Grooming 4   Eating 4   Daily Activity Raw Score 21   Daily Activity Standardized Score (Calc for Raw Score >=11) 44 27   AM-PAC Applied Cognition Inpatient   Following a Speech/Presentation 4   Understanding Ordinary Conversation 4   Taking Medications 4   Remembering Where Things Are Placed or Put Away 4   Remembering List of 4-5 Errands 4   Taking Care of Complicated Tasks 4   Applied Cognition Raw Score 24   Applied Cognition Standardized Score 62 21      Jake Doran MS, OTR/L

## 2021-06-04 NOTE — UTILIZATION REVIEW
Initial Clinical Review    Elective IP surgical procedure    Age/Sex: 67 y o  female     Surgery Date: 6/3/21    Procedure:   Preop Diagnosis:  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]     Post-Op Diagnosis Codes:     * 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]     Procedure(s) (LRB):  -Diagnostic laparoscopy  -Laparoscopic hand assist splenic flexure mobilization  -Laparoscopic hand assist extended left hemicolectomy with transverse to rectal EEA 29 anastomosis  -Spy intraop fluorescence angiography  -Intraoperative flexible sigmoidoscopy  -Partial omentectomy as well as en bloc abdominal wall excision    Anesthesia: General     Operative Findings:   -large distal transverse colon cancer with adherent omentum, phlegmon adherent to abdominal wall requiring abdominal wall excision EN bloc with specimen  -Normal diagnostic laparoscopy without visualized hepatic lesion other than cysts, or peritoneal lesion other than tumor phlegmon  -EEA 29 colorectal anastomosis to 15cm  -Negative bubble leak test, intact anastomotic rings, good blood supply to transverse pedicle by SPY    POD#1 Progress Note:  Doing well with good pain control, no N/V, no flatus or BM, continue with Dilaudid PCA , d/c radford, ambulate, PT OT evals  IV fluids continue  Admission Orders: Date/Time/Statement:   Admission Orders (From admission, onward)     Ordered        06/03/21 1635  Inpatient Admission  Once                   Orders Placed This Encounter   Procedures    Inpatient Admission     Standing Status:   Standing     Number of Occurrences:   1     Order Specific Question:   Level of Care     Answer:   Med Surg [16]     Order Specific Question:   Estimated length of stay     Answer:   More than 2 Midnights     Order Specific Question:   Certification     Answer:   I certify that inpatient services are medically necessary for this patient for a duration of greater than two midnights   See H&P and MD Progress Notes for additional information about the patient's course of treatment  Vital Signs: /65   Pulse 67   Temp 98 3 °F (36 8 °C)   Resp 16   Ht 5' 4" (1 626 m)   Wt 64 9 kg (143 lb)   LMP  (LMP Unknown)   SpO2 96%   BMI 24 55 kg/m²     Pertinent Labs/Diagnostic Test Results:       Results from last 7 days   Lab Units 06/04/21  0644 06/03/21  1113   WBC Thousand/uL 7 47 6 62   HEMOGLOBIN g/dL 10 2* 11 9   HEMATOCRIT % 33 7* 38 9   PLATELETS Thousands/uL 261 302   NEUTROS ABS Thousands/µL 5 67 4 69         Results from last 7 days   Lab Units 06/04/21  0644   SODIUM mmol/L 141   POTASSIUM mmol/L 3 4*   CHLORIDE mmol/L 112*   CO2 mmol/L 23   ANION GAP mmol/L 6   BUN mg/dL 11   CREATININE mg/dL 0 72   EGFR ml/min/1 73sq m 81   CALCIUM mg/dL 9 0     Results from last 7 days   Lab Units 06/04/21  0644   GLUCOSE RANDOM mg/dL 117     Diet: clear liquid    Mobility:OOB     DVT Prophylaxis: SCDs, Heparin sq    Medications/Pain Control:   Scheduled Medications:  acetaminophen, 975 mg, Oral, Q8H [address]  amLODIPine, 7 5 mg, Oral, QPM  atorvastatin, 40 mg, Oral, Daily With Dinner  fluticasone-salmeterol, 1 puff, Inhalation, Daily  heparin (porcine), 5,000 Units, Subcutaneous, Q8H [place]ONCIO  montelukast, 10 mg, Oral, HS      Continuous IV Infusions:  dextrose 5 % and sodium chloride 0 45 % with KCl 20 mEq/L, 84 mL/hr, Intravenous, Continuous  HYDROmorphone, , Intravenous, Continuous      PRN Meds:  fluticasone, 1 spray, Each Nare, Daily PRN  lactated ringers, 1,000 mL, Intravenous, Once PRN    And  lactated ringers, 1,000 mL, Intravenous, Once PRN  ondansetron, 4 mg, Intravenous, Q6H PRN  sodium chloride, 1,000 mL, Intravenous, Once PRN    And  sodium chloride, 1,000 mL, Intravenous, Once PRN    Network Utilization Review Department  ATTENTION: Please call with any questions or concerns to [phone] and carefully listen to the prompts so that you are directed to the right person   All voicemails are confidential   Zoey Drape all requests for admission clinical reviews, approved or denied determinations and any other requests to dedicated fax number below belonging to the campus where the patient is receiving treatment   List of dedicated fax numbers for the Facilities:  [address] [place] [address] DENIALS (Administrative/Medical Necessity) [phone]   [address] (Maternity/NICU/Pediatrics) [phone]   [address] [facility] [address] [address] [address] [facility] [place] [address] [phone]

## 2021-06-04 NOTE — PHYSICAL THERAPY NOTE
Physical Therapy Evaluation note     Patient Name: Sam Gan    ZIBZT'O Date: 6/4/2021     Problem List  Principal Problem:    Colon adenocarcinoma [facility])  Active Problems:    Anemia       Past Medical History  Past Medical History:   Diagnosis Date    Allergic rhinitis     last assessed: 12/12/2012    Asthma     Dysuria 5/14/2020    Hyperlipidemia     Hypertension     Hypokalemia     last assessed: 12/14/2012    Urine frequency 5/25/2016        Past Surgical History  Past Surgical History:   Procedure Laterality Date    COLONOSCOPY  2009    COLPOSCOPY      DIAGNOSTIC LAPAROSCOPY  1987    TUBAL LIGATION           06/04/21 1157   PT Last Visit   PT Visit Date 06/04/21   Note Type   Note type Evaluation   Pain Assessment   Pain Assessment Tool 0-10   Pain Score 8   Pain Location/Orientation Location: Abdomen   Home Living   Type of [address] Two [address]   Additional Comments Pt lives with her  in a 2Sh with no JA  Pt states that her  is able to assist upon d c home  Pt does not own any DME and did not use prior to admission  Pt is very active  Pt's bed and bath are on the second floor   Pt has a bathroom on the first     Prior Function   Level of [place] Independent with ADLs and functional mobility   Lives With Spouse   Receives Help From Family   ADL Assistance Independent   IADLs Independent   Falls in the last 6 months 0   Vocational Retired   Comments +drives   Restrictions/Precautions   Wells Vaibhav Bearing Precautions Per Order No   Other Precautions Multiple lines;Pain  (epidural)   General   Family/Caregiver Present No   Cognition   Overall Cognitive Status WFL   Arousal/Participation Alert   Attention Within functional limits   Orientation Level Oriented X4   Memory Within functional limits   Following Commands Follows all commands and directions without difficulty   RLE Assessment   RLE Assessment WFL   LLE Assessment   LLE Assessment WFL   Coordination   Sensation WFL   Light Touch   RLE Light Touch Grossly intact   LLE Light Touch Grossly intact   Bed Mobility   Supine to Sit 6  Modified independent   Additional items HOB elevated   Additional Comments sitting EOB at a S level   Transfers   Sit to Stand 7  Independent   Stand to Sit 7  Independent   Ambulation/Elevation   Gait pattern Foward flexed; Shuffling; Antalgic   Gait Assistance 5  Supervision   Assistive Device None   Distance [id number], [id number]   Stair Management Assistance 5  Supervision   Stair Management Technique One rail R   Number of Stairs 3  (limited by lines )   Balance   Static Sitting Good   Dynamic Sitting Fair +   Static Standing Fair   Dynamic Standing Fair   Ambulatory Fair   Endurance Deficit   Endurance Deficit No   Activity Tolerance   Activity Tolerance Patient tolerated treatment well   Medical Staff Made Aware OT  (due to recent surgery )   Nurse Made Aware nurse approved therapy session   Assessment   Prognosis Excellent   Problem List Decreased mobility   Assessment Pt is a 66 yo female admitted to [place] [address] on 6/3/2021 s/p surgery for L extended hemicolectomy with intraoperative flexible sigmoidoscopy, partial omentectomy  Dx: adenocarcinoma  Two patient identifiers were used to confirm  Pt lives in a [facility] with her   Pt has no JA  Pt was I for ADL's and mobility prior  Pt does not own any [employer]  Pt drives and was very active prior to admission  Pt's impairments include reduced mobility, pain  Recommend discharge to home  At the end of the session the patient was left in seated position with call bell and phone within reach  Pt educated about he importance of ambulating 3-4x a day  Restorative staff notified about D/C from PT at this time and to walk with the patient often  Will be D/C from PT due to being near her baseline and having assistance from her  at home      Recommendation   PT Discharge Recommendation No rehabilitation needs   PT - OK to Discharge Yes   Additional Comments when medically stable   AM-PAC Basic Mobility Inpatient   Turning in Bed Without Bedrails 4   Lying on Back to Sitting on Edge of Flat Bed 4   Moving Bed to Chair 3   Standing Up From Chair 4   Walk in Room 3   Climb 3-5 Stairs 3   Basic Mobility Inpatient Raw Score 21   Basic Mobility Standardized Score 45 55   Shaji Mora, PT, DPT

## 2021-06-05 NOTE — PROGRESS NOTES
Progress Note - Colorectal Surgery   Mendy Ortiz 67 y o  female MRN: [id number]  Unit/Bed#: [facility] 832-01 Encounter: [id number]    Assessment:  Pt is a 67y o  F with colon adenocarcinoma s/p 6/4 lap  left extended hemicolectomy with intraoperative flexible sigmoidoscopy, partial omentectomy with EN bloc abdominal wall excision  2 3L UOP    Plan:  Clears T&Cr  D/c MIVF  Prn pain control with PO meds  OOB/ambualte  PT/OT- no rehab needs  DVT ppx    Subjective/Objective   Chief Complaint:     Subjective: MONY  Afebrile  Tolerating clears  Denies flatus/BM  Pain controlled, only hit PCA-D 3 times yesterday  Objective:     Blood pressure 143/68, pulse 77, temperature 98 8 °F (37 1 °C), resp  rate 17, height 5' 4" (1 626 m), weight 64 9 kg (143 lb), SpO2 95 %, not currently breastfeeding  ,Body mass index is 24 55 kg/m²        Intake/Output Summary (Last 24 hours) at 6/5/2021 0302  Last data filed at 6/4/2021 2223  Gross per 24 hour   Intake 3358 66 ml   Output 1500 ml   Net 1858 66 ml       Invasive Devices     Peripheral Intravenous Line            Peripheral IV 06/03/21 Left Forearm 1 day    Peripheral IV 06/03/21 Left Hand 1 day                Physical Exam: NAD  Atraumatic/normocephalic  AAOX3  Normal mood and affect  Normal respiratory effort  Soft, non tender, ND  Incisions c/d/i  Skin warm/dry  Cap refil <2 sec

## 2021-06-05 NOTE — PLAN OF CARE
Problem: PAIN - ADULT  Goal: Verbalizes/displays adequate comfort level or baseline comfort level  Description: Interventions:  - Encourage patient to monitor pain and request assistance  - Assess pain using appropriate pain scale  - Administer analgesics based on type and severity of pain and evaluate response  - Implement non-pharmacological measures as appropriate and evaluate response  - Consider cultural and social influences on pain and pain management  - Notify physician/advanced practitioner if interventions unsuccessful or patient reports new pain  Outcome: Progressing     Problem: INFECTION - ADULT  Goal: Absence or prevention of progression during hospitalization  Description: INTERVENTIONS:  - Assess and monitor for signs and symptoms of infection  - Monitor lab/diagnostic results  - Monitor all insertion sites, i e  indwelling lines, tubes, and drains  - Monitor endotracheal if appropriate and nasal secretions for changes in amount and color  - [place] appropriate cooling/warming therapies per order  - Administer medications as ordered  - Instruct and encourage patient and family to use good hand hygiene technique  - Identify and instruct in appropriate isolation precautions for identified infection/condition  Outcome: Progressing  Goal: Absence of fever/infection during neutropenic period  Description: INTERVENTIONS:  - Monitor WBC    Outcome: Progressing     Problem: SAFETY ADULT  Goal: Patient will remain free of falls  Description: INTERVENTIONS:  - Assess patient frequently for physical needs  -  Identify cognitive and physical deficits and behaviors that affect risk of falls    -  [place] fall precautions as indicated by assessment   - Educate patient/family on patient safety including physical limitations  - Instruct patient to call for assistance with activity based on assessment  - Modify environment to reduce risk of injury  - Consider OT/PT consult to assist with strengthening/mobility  Outcome: Progressing  Goal: Maintain or return to baseline ADL function  Description: INTERVENTIONS:  -  Assess patient's ability to carry out ADLs; assess patient's baseline for ADL function and identify physical deficits which impact ability to perform ADLs (bathing, care of mouth/teeth, toileting, grooming, dressing, etc )  - Assess/evaluate cause of self-care deficits   - Assess range of motion  - Assess patient's mobility; develop plan if impaired  - Assess patient's need for assistive devices and provide as appropriate  - Encourage maximum independence but intervene and supervise when necessary  - Involve family in performance of ADLs  - Assess for home care needs following discharge   - Consider OT consult to assist with ADL evaluation and planning for discharge  - Provide patient education as appropriate  Outcome: Progressing  Goal: Maintain or return mobility status to optimal level  Description: INTERVENTIONS:  - Assess patient's baseline mobility status (ambulation, transfers, stairs, etc )    - Identify cognitive and physical deficits and behaviors that affect mobility  - Identify mobility aids required to assist with transfers and/or ambulation (gait belt, sit-to-stand, lift, walker, cane, etc )  - [place] fall precautions as indicated by assessment  - Record patient progress and toleration of activity level on Mobility SBAR; progress patient to next Phase/Stage  - Instruct patient to call for assistance with activity based on assessment  - Consider rehabilitation consult to assist with strengthening/weightbearing, etc   Outcome: Progressing     Problem: DISCHARGE PLANNING  Goal: Discharge to home or other facility with appropriate resources  Description: INTERVENTIONS:  - Identify barriers to discharge w/patient and caregiver  - Arrange for needed discharge resources and transportation as appropriate  - Identify discharge learning needs (meds, wound care, etc )  - Arrange for interpretive services to assist at discharge as needed  - Refer to Case Management Department for coordinating discharge planning if the patient needs post-hospital services based on physician/advanced practitioner order or complex needs related to functional status, cognitive ability, or social support system  Outcome: Progressing     Problem: Knowledge Deficit  Goal: Patient/family/caregiver demonstrates understanding of disease process, treatment plan, medications, and discharge instructions  Description: Complete learning assessment and assess knowledge base  Interventions:  - Provide teaching at level of understanding  - Provide teaching via preferred learning methods  Outcome: Progressing     Problem: Potential for Falls  Goal: Patient will remain free of falls  Description: INTERVENTIONS:  - Assess patient frequently for physical needs  -  Identify cognitive and physical deficits and behaviors that affect risk of falls    -  [place] fall precautions as indicated by assessment   - Educate patient/family on patient safety including physical limitations  - Instruct patient to call for assistance with activity based on assessment  - Modify environment to reduce risk of injury  - Consider OT/PT consult to assist with strengthening/mobility  Outcome: Progressing

## 2021-06-06 NOTE — PROGRESS NOTES
Progress Note - Colorectal Surgery   Norbert Sánchez 67 y o  female MRN: [id number]  Unit/Bed#: [facility] 832-01 Encounter: [id number]    Assessment:  Pt is a 67y o  F with colon adenocarcinoma s/p 6/4 lap  left extended hemicolectomy with intraoperative flexible sigmoidoscopy, partial omentectomy with EN bloc abdominal wall excision  Plan:  Regular diet  PRN analgesia  OOB/ambulate  PT/OT- no rehab needs  DVT ppx  D/C home today      Subjective/Objective   Chief Complaint:     Subjective: tolerating diet and having bowel movements  Denies complaints    Objective:     Blood pressure 142/75, pulse 81, temperature 98 4 °F (36 9 °C), resp  rate 19, height 5' 4" (1 626 m), weight 64 9 kg (143 lb), SpO2 95 %, not currently breastfeeding  ,Body mass index is 24 55 kg/m²        Intake/Output Summary (Last 24 hours) at 6/6/2021 4866  Last data filed at 6/5/2021 1101  Gross per 24 hour   Intake 220 ml   Output 300 ml   Net -80 ml       Invasive Devices     Peripheral Intravenous Line            Peripheral IV 06/03/21 Left Forearm 2 days    Peripheral IV 06/03/21 Left Hand 2 days                Physical Exam:   NAD, alert and oriented x3  Normocephalic, atraumatic  MMM, EOMI, PERRLA  Norm resp effort on RA  RRR  Abd soft, NT/ND, incision cdi  No calf tenderness or peripheral edema  Motor/sensation intact in distal extremities  CN grossly intact  -rash/lesions      Lizzy Gutierrez MD

## 2021-06-06 NOTE — PLAN OF CARE
Problem: PAIN - ADULT  Goal: Verbalizes/displays adequate comfort level or baseline comfort level  Description: Interventions:  - Encourage patient to monitor pain and request assistance  - Assess pain using appropriate pain scale  - Administer analgesics based on type and severity of pain and evaluate response  - Implement non-pharmacological measures as appropriate and evaluate response  - Consider cultural and social influences on pain and pain management  - Notify physician/advanced practitioner if interventions unsuccessful or patient reports new pain  Outcome: Progressing     Problem: INFECTION - ADULT  Goal: Absence or prevention of progression during hospitalization  Description: INTERVENTIONS:  - Assess and monitor for signs and symptoms of infection  - Monitor lab/diagnostic results  - Monitor all insertion sites, i e  indwelling lines, tubes, and drains  - Monitor endotracheal if appropriate and nasal secretions for changes in amount and color  - [place] appropriate cooling/warming therapies per order  - Administer medications as ordered  - Instruct and encourage patient and family to use good hand hygiene technique  - Identify and instruct in appropriate isolation precautions for identified infection/condition  Outcome: Progressing  Goal: Absence of fever/infection during neutropenic period  Description: INTERVENTIONS:  - Monitor WBC    Outcome: Progressing     Problem: SAFETY ADULT  Goal: Patient will remain free of falls  Description: INTERVENTIONS:  - Assess patient frequently for physical needs  -  Identify cognitive and physical deficits and behaviors that affect risk of falls    -  [place] fall precautions as indicated by assessment   - Educate patient/family on patient safety including physical limitations  - Instruct patient to call for assistance with activity based on assessment  - Modify environment to reduce risk of injury  - Consider OT/PT consult to assist with strengthening/mobility  Outcome: Progressing  Goal: Maintain or return to baseline ADL function  Description: INTERVENTIONS:  -  Assess patient's ability to carry out ADLs; assess patient's baseline for ADL function and identify physical deficits which impact ability to perform ADLs (bathing, care of mouth/teeth, toileting, grooming, dressing, etc )  - Assess/evaluate cause of self-care deficits   - Assess range of motion  - Assess patient's mobility; develop plan if impaired  - Assess patient's need for assistive devices and provide as appropriate  - Encourage maximum independence but intervene and supervise when necessary  - Involve family in performance of ADLs  - Assess for home care needs following discharge   - Consider OT consult to assist with ADL evaluation and planning for discharge  - Provide patient education as appropriate  Outcome: Progressing  Goal: Maintain or return mobility status to optimal level  Description: INTERVENTIONS:  - Assess patient's baseline mobility status (ambulation, transfers, stairs, etc )    - Identify cognitive and physical deficits and behaviors that affect mobility  - Identify mobility aids required to assist with transfers and/or ambulation (gait belt, sit-to-stand, lift, walker, cane, etc )  - [place] fall precautions as indicated by assessment  - Record patient progress and toleration of activity level on Mobility SBAR; progress patient to next Phase/Stage  - Instruct patient to call for assistance with activity based on assessment  - Consider rehabilitation consult to assist with strengthening/weightbearing, etc   Outcome: Progressing     Problem: DISCHARGE PLANNING  Goal: Discharge to home or other facility with appropriate resources  Description: INTERVENTIONS:  - Identify barriers to discharge w/patient and caregiver  - Arrange for needed discharge resources and transportation as appropriate  - Identify discharge learning needs (meds, wound care, etc )  - Arrange for interpretive services to assist at discharge as needed  - Refer to Case Management Department for coordinating discharge planning if the patient needs post-hospital services based on physician/advanced practitioner order or complex needs related to functional status, cognitive ability, or social support system  Outcome: Progressing     Problem: Knowledge Deficit  Goal: Patient/family/caregiver demonstrates understanding of disease process, treatment plan, medications, and discharge instructions  Description: Complete learning assessment and assess knowledge base  Interventions:  - Provide teaching at level of understanding  - Provide teaching via preferred learning methods  Outcome: Progressing     Problem: Potential for Falls  Goal: Patient will remain free of falls  Description: INTERVENTIONS:  - Assess patient frequently for physical needs  -  Identify cognitive and physical deficits and behaviors that affect risk of falls    -  [place] fall precautions as indicated by assessment   - Educate patient/family on patient safety including physical limitations  - Instruct patient to call for assistance with activity based on assessment  - Modify environment to reduce risk of injury  - Consider OT/PT consult to assist with strengthening/mobility  Outcome: Progressing     Problem: GASTROINTESTINAL - ADULT  Goal: Maintains or returns to baseline bowel function  Description: INTERVENTIONS:  - Assess bowel function  - Encourage oral fluids to ensure adequate hydration  - Administer IV fluids if ordered to ensure adequate hydration  - Administer ordered medications as needed  - Encourage mobilization and activity  - Consider nutritional services referral to assist patient with adequate nutrition and appropriate food choices  Outcome: Progressing  Goal: Maintains adequate nutritional intake  Description: INTERVENTIONS:  - Monitor percentage of each meal consumed  - Identify factors contributing to decreased intake, treat as appropriate  - Assist with meals as needed  - Monitor I&O, weight, and lab values if indicated  - Obtain nutrition services referral as needed  Outcome: Progressing     Problem: METABOLIC, FLUID AND ELECTROLYTES - ADULT  Goal: Electrolytes maintained within normal limits  Description: INTERVENTIONS:  - Monitor labs and assess patient for signs and symptoms of electrolyte imbalances  - Administer electrolyte replacement as ordered  - Monitor response to electrolyte replacements, including repeat lab results as appropriate  - Instruct patient on fluid and nutrition as appropriate  Outcome: Progressing     Problem: SKIN/TISSUE INTEGRITY - ADULT  Goal: Skin integrity remains intact  Description: INTERVENTIONS  - Identify patients at risk for skin breakdown  - Assess and monitor skin integrity  - Assess and monitor nutrition and hydration status  - Monitor labs (i e  albumin)  - Assess for incontinence   - Turn and reposition patient  - Assist with mobility/ambulation  - Relieve pressure over bony prominences  - Avoid friction and shearing  - Provide appropriate hygiene as needed including keeping skin clean and dry  - Evaluate need for skin moisturizer/barrier cream  - Collaborate with interdisciplinary team (i e  Nutrition, Rehabilitation, etc )   - Patient/family teaching  Outcome: Progressing

## 2021-06-06 NOTE — DISCHARGE SUMMARY
Discharge Summary - Jayjay Patrick 67 y o  female MRN: [id number]    Unit/Bed#: [facility] 668-73 Encounter: [id number]    Admission Date:   Admission Orders (From admission, onward)     Ordered        06/03/21 1635  Inpatient Admission  Once                     Admitting Diagnosis: Adenocarcinoma of transverse colon ([facility] [address] ) [C18 4]    HPI: Per Dr Carney Seed: Anthony Sarmiento a 67 y  o  female referred for evaluation today by Dr Lor Dee, for colonic mass       She complains of an occasional "twinge" on the lower left quadrant    She denies any change in bowel habits or rectal bleeding       Her last colonoscopy was 3/4/2021, with Dr Nilson Cline, which revealed 7 cm mass in the distal transverse/descending colon; this was biopsied with cold biopsy forceps  2 polyps in the sigmoid colon removed with cold snare polypectomy   Hemorrhoids  Of note, patient had episodes of bradycardia during the exam which spontaneously recovered       Colonoscopy pathology reported:   A  Colon, Ascending colon, Polypectomy:  - Tubular adenoma  - No high grade dysplasia and no evidence of malignancy       B  Colon, Descending colon mass  37 cm, Biopsy:  - Adenocarcinoma      Note: Ancillary testing for mismatch repair (MMR) protein deficiency by immunohistochemical panel (MLH1, PMS2, MSH2, MSH6) is undertaken (Block B1); the results will be issued in a supplemental report, to follow shortly      C  Colon, Sigmoid polyp, Polypectomy:  - Colonic mucosa with reactive changes    - No epithelial dysplasia and no evidence of malignancy  Procedures Performed: No orders of the defined types were placed in this encounter  Summary of Hospital Course: Pt underwent her laparoscopic extended left colectomy and was admitted to the colorectal service post operatively  POD 1 her radford catheter was removed and she was advanced to a clear liquid diet, her pain was controlled  POD 2 she was passing flatus and advanced to a lo residue diet   PT/OT evaluated her and she had no discharge needs  POD 3 she continued to tolerate a diet, pain was controlled on PO meds, she was having bowel function, ambulating on her own and fit for discharge home  She will follow up with Dr Ranulfo Del Castillo in 3 weeks  Significant Findings, Care, Treatment and Services Provided: 6/3 lap extended left colectomy     Complications: None    Discharge Diagnosis:  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]    Resolved Problems  Date Reviewed: 3/12/2021    None          Condition at Discharge: good         Discharge instructions/Information to patient and family:   See after visit summary for information provided to patient and family  Provisions for Follow-Up Care:  See after visit summary for information related to follow-up care and any pertinent home health orders  PCP: Shannan Hollins MD    Disposition: See After Visit Summary for discharge disposition information  Planned Readmission: No      Discharge Statement   I spent 30 minutes discharging the patient  This time was spent on the day of discharge  I had direct contact with the patient on the day of discharge  Additional documentation is required if more than 30 minutes were spent on discharge  Discharge Medications:  See after visit summary for reconciled discharge medications provided to patient and family

## 2021-06-06 NOTE — PLAN OF CARE
Problem: PAIN - ADULT  Goal: Verbalizes/displays adequate comfort level or baseline comfort level  Description: Interventions:  - Encourage patient to monitor pain and request assistance  - Assess pain using appropriate pain scale  - Administer analgesics based on type and severity of pain and evaluate response  - Implement non-pharmacological measures as appropriate and evaluate response  - Consider cultural and social influences on pain and pain management  - Notify physician/advanced practitioner if interventions unsuccessful or patient reports new pain  6/6/2021 1503 by Jacky Alatorre RN  Outcome: Completed  6/6/2021 0750 by Jacky Alatorre RN  Outcome: Progressing     Problem: INFECTION - ADULT  Goal: Absence or prevention of progression during hospitalization  Description: INTERVENTIONS:  - Assess and monitor for signs and symptoms of infection  - Monitor lab/diagnostic results  - Monitor all insertion sites, i e  indwelling lines, tubes, and drains  - Monitor endotracheal if appropriate and nasal secretions for changes in amount and color  - [place] appropriate cooling/warming therapies per order  - Administer medications as ordered  - Instruct and encourage patient and family to use good hand hygiene technique  - Identify and instruct in appropriate isolation precautions for identified infection/condition  6/6/2021 1503 by Jacky Alatorre RN  Outcome: Completed  6/6/2021 0750 by Jacky Alatorre RN  Outcome: Progressing  Goal: Absence of fever/infection during neutropenic period  Description: INTERVENTIONS:  - Monitor WBC    6/6/2021 1503 by Jacky Alatorre RN  Outcome: Completed  6/6/2021 0750 by Jacky Alatorre RN  Outcome: Progressing     Problem: SAFETY ADULT  Goal: Patient will remain free of falls  Description: INTERVENTIONS:  - Assess patient frequently for physical needs  -  Identify cognitive and physical deficits and behaviors that affect risk of falls    -  [place] fall precautions as indicated by assessment   - Educate patient/family on patient safety including physical limitations  - Instruct patient to call for assistance with activity based on assessment  - Modify environment to reduce risk of injury  - Consider OT/PT consult to assist with strengthening/mobility  6/6/2021 1503 by Albertha Blizzard, RN  Outcome: Completed  6/6/2021 0750 by Albertha Blizzard, RN  Outcome: Progressing  Goal: Maintain or return to baseline ADL function  Description: INTERVENTIONS:  -  Assess patient's ability to carry out ADLs; assess patient's baseline for ADL function and identify physical deficits which impact ability to perform ADLs (bathing, care of mouth/teeth, toileting, grooming, dressing, etc )  - Assess/evaluate cause of self-care deficits   - Assess range of motion  - Assess patient's mobility; develop plan if impaired  - Assess patient's need for assistive devices and provide as appropriate  - Encourage maximum independence but intervene and supervise when necessary  - Involve family in performance of ADLs  - Assess for home care needs following discharge   - Consider OT consult to assist with ADL evaluation and planning for discharge  - Provide patient education as appropriate  6/6/2021 1503 by Albertha Blizzard, RN  Outcome: Completed  6/6/2021 0750 by Albertha Blizzard, RN  Outcome: Progressing  Goal: Maintain or return mobility status to optimal level  Description: INTERVENTIONS:  - Assess patient's baseline mobility status (ambulation, transfers, stairs, etc )    - Identify cognitive and physical deficits and behaviors that affect mobility  - Identify mobility aids required to assist with transfers and/or ambulation (gait belt, sit-to-stand, lift, walker, cane, etc )  - [place] fall precautions as indicated by assessment  - Record patient progress and toleration of activity level on Mobility SBAR; progress patient to next Phase/Stage  - Instruct patient to call for assistance with activity based on assessment  - Consider rehabilitation consult to assist with strengthening/weightbearing, etc   6/6/2021 1503 by Sandra Hernandez RN  Outcome: Completed  6/6/2021 0750 by Sandra Hernandez RN  Outcome: Progressing     Problem: DISCHARGE PLANNING  Goal: Discharge to home or other facility with appropriate resources  Description: INTERVENTIONS:  - Identify barriers to discharge w/patient and caregiver  - Arrange for needed discharge resources and transportation as appropriate  - Identify discharge learning needs (meds, wound care, etc )  - Arrange for interpretive services to assist at discharge as needed  - Refer to Case Management Department for coordinating discharge planning if the patient needs post-hospital services based on physician/advanced practitioner order or complex needs related to functional status, cognitive ability, or social support system  6/6/2021 1503 by Sandra Hernandez RN  Outcome: Completed  6/6/2021 0750 by Sandra Hernandez RN  Outcome: Progressing     Problem: Knowledge Deficit  Goal: Patient/family/caregiver demonstrates understanding of disease process, treatment plan, medications, and discharge instructions  Description: Complete learning assessment and assess knowledge base  Interventions:  - Provide teaching at level of understanding  - Provide teaching via preferred learning methods  6/6/2021 1503 by Sandra Hernandez RN  Outcome: Completed  6/6/2021 0750 by Sandra Hernandez RN  Outcome: Progressing     Problem: Potential for Falls  Goal: Patient will remain free of falls  Description: INTERVENTIONS:  - Assess patient frequently for physical needs  -  Identify cognitive and physical deficits and behaviors that affect risk of falls    -  [place] fall precautions as indicated by assessment   - Educate patient/family on patient safety including physical limitations  - Instruct patient to call for assistance with activity based on assessment  - Modify environment to reduce risk of injury  - Consider OT/PT consult to assist with strengthening/mobility  6/6/2021 1503 by Kt Botello RN  Outcome: Completed  6/6/2021 0750 by Kt Botello RN  Outcome: Progressing     Problem: GASTROINTESTINAL - ADULT  Goal: Maintains or returns to baseline bowel function  Description: INTERVENTIONS:  - Assess bowel function  - Encourage oral fluids to ensure adequate hydration  - Administer IV fluids if ordered to ensure adequate hydration  - Administer ordered medications as needed  - Encourage mobilization and activity  - Consider nutritional services referral to assist patient with adequate nutrition and appropriate food choices  6/6/2021 1503 by Kt Botello RN  Outcome: Completed  6/6/2021 0750 by Kt Botello RN  Outcome: Progressing  Goal: Maintains adequate nutritional intake  Description: INTERVENTIONS:  - Monitor percentage of each meal consumed  - Identify factors contributing to decreased intake, treat as appropriate  - Assist with meals as needed  - Monitor I&O, weight, and lab values if indicated  - Obtain nutrition services referral as needed  6/6/2021 1503 by Kt Botello RN  Outcome: Completed  6/6/2021 0750 by Kt Botello RN  Outcome: Progressing     Problem: METABOLIC, FLUID AND ELECTROLYTES - ADULT  Goal: Electrolytes maintained within normal limits  Description: INTERVENTIONS:  - Monitor labs and assess patient for signs and symptoms of electrolyte imbalances  - Administer electrolyte replacement as ordered  - Monitor response to electrolyte replacements, including repeat lab results as appropriate  - Instruct patient on fluid and nutrition as appropriate  6/6/2021 1503 by Kt Botello RN  Outcome: Completed  6/6/2021 0750 by Kt Botello RN  Outcome: Progressing     Problem: SKIN/TISSUE INTEGRITY - ADULT  Goal: Skin integrity remains intact  Description: INTERVENTIONS  - Identify patients at risk for skin breakdown  - Assess and monitor skin integrity  - Assess and monitor nutrition and hydration status  - Monitor labs (i e  albumin)  - Assess for incontinence   - Turn and reposition patient  - Assist with mobility/ambulation  - Relieve pressure over bony prominences  - Avoid friction and shearing  - Provide appropriate hygiene as needed including keeping skin clean and dry  - Evaluate need for skin moisturizer/barrier cream  - Collaborate with interdisciplinary team (i e  Nutrition, Rehabilitation, etc )   - Patient/family teaching  6/6/2021 1503 by Suzy Streeter RN  Outcome: Completed  6/6/2021 0750 by Suzy Streeter RN  Outcome: Progressing

## 2021-06-06 NOTE — DISCHARGE INSTRUCTIONS
Call to follow up with Dr Grimm Speak  Do not drive while on narcotic pain medications     Colectomy   WHAT YOU NEED TO KNOW:   A colectomy is surgery to remove part or all of your colon  The colon, or large intestine, is the long tube that connects your intestine with your anus  DISCHARGE INSTRUCTIONS:   Medicines: You may need any of the following:  · Prescription pain medicine  may be given  Ask your healthcare provider how to take this medicine safely  · Bowel movement softeners  make it easier for you to have a bowel movement  You may need this medicine to prevent constipation  · Blood thinners  help prevent blood clots  Examples of blood thinners include heparin and warfarin  Clots can cause strokes, heart attacks, and death  The following are general safety guidelines to follow while you are taking a blood thinner:    ? Watch for bleeding and bruising while you take blood thinners  Watch for bleeding from your gums or nose  Watch for blood in your urine and bowel movements  Use a soft washcloth on your skin, and a soft toothbrush to brush your teeth  This can keep your skin and gums from bleeding  If you shave, use an electric shaver  Do not play contact sports  ? Tell your dentist and other healthcare providers that you take anticoagulants  Wear a bracelet or necklace that says you take this medicine  ? Do not start or stop any medicines unless your healthcare provider tells you to  Many medicines cannot be used with blood thinners  ? Tell your healthcare provider right away if you forget to take the medicine, or if you take too much  ? Warfarin  is a blood thinner that you may need to take  The following are things you should be aware of if you take warfarin:     § Foods and medicines can affect the amount of warfarin in your blood  Do not make major changes to your diet while you take warfarin  Warfarin works best when you eat about the same amount of vitamin K every day   Vitamin K is found in green leafy vegetables and certain other foods  Ask for more information about what to eat when you are taking warfarin  § You will need to see your healthcare provider for follow-up visits when you are on warfarin  You will need regular blood tests  These tests are used to decide how much medicine you need  · Antibiotics  help prevent an infection caused by bacteria  · Take your medicine as directed  Contact your healthcare provider if you think your medicine is not helping or if you have side effects  Tell him or her if you are allergic to any medicine  Keep a list of the medicines, vitamins, and herbs you take  Include the amounts, and when and why you take them  Bring the list or the pill bottles to follow-up visits  Carry your medicine list with you in case of an emergency  Follow up with your healthcare provider as directed: You may need to return for tests or to have your stitches or staples removed  Write down your questions so you remember to ask them during your visits  Eat a variety of low-fiber foods: Low-fiber, or low-residue, foods are easy to digest  Good choices include eggs, white bread, creamy peanut butter, and macaroni  This will help slow down your bowel movements and prevent trauma to your colon  Do not eat whole-wheat breads or pastas, raw fruits or vegetables, or nuts  Return to your daily activities as directed: It may take 2 or 3 weeks to return to your usual activities  You may need to avoid lifting anything over 20 pounds for 6 weeks  Contact your healthcare provider if:   · You have a fever  · Your wound is red, swollen, or draining pus  · You have nausea or are vomiting  · You have trouble urinating or feel burning when you urinate  · You have questions or concerns about your condition or care  Seek care immediately or call 911 if:   · You feel lightheaded, short of breath, and have chest pain  · You cough up blood      · Your arm or leg feels warm, tender, and painful  It may look swollen and red  · Blood soaks through your bandage  · Your stitches come apart  · You have severe pain  · You are unable to have a bowel movement or pass gas  · Your abdomen is swollen and hard  · You cannot eat without vomiting  · You see blood in your bowel movement or on your toilet paper  © Copyright [employer] 2018 Information is for End User's use only and may not be sold, redistributed or otherwise used for commercial purposes  All illustrations and images included in CareNotes® are the copyrighted property of [employer]  or [address] [place] [address]  The above information is an  only  It is not intended as medical advice for individual conditions or treatments  Talk to your doctor, nurse or pharmacist before following any medical regimen to see if it is safe and effective for you

## 2021-06-07 NOTE — UTILIZATION REVIEW
Notification of Discharge   This is a Notification of Discharge from our facility [address]  Please be advised that this patient has been discharge from our facility  Below you will find the admission and discharge date and time including the patients disposition  UTILIZATION REVIEW CONTACT:  Sarah Israel  Utilization   Network Utilization Review Department  Phone: [phone] x carefully listen to the prompts  All voicemails are confidential   Email: [email or web address]     PHYSICIAN ADVISORY SERVICES:  FOR LUVN-MS-RZPN REVIEW - MEDICAL NECESSITY DENIAL  Phone: [phone]  Fax: [phone]  Email: [email or web address]     PRESENTATION DATE: 6/3/2021 10:28 AM  OBERVATION ADMISSION DATE:   INPATIENT ADMISSION DATE: 6/3/21 1635   DISCHARGE DATE: 6/6/2021  3:06 PM  DISPOSITION: Home/Self Care Home/Self Care      IMPORTANT INFORMATION:  Send all requests for admission clinical reviews, approved or denied determinations and any other requests to dedicated fax number below belonging to the campus where the patient is receiving treatment   List of dedicated fax numbers:  [address] DENIALS (Administrative/Medical Necessity) [phone]   [address] (Maternity/NICU/Pediatrics) [phone]   [facility] [phone]   Nica Muniz [phone]   Cherelle Luo [phone]   [facility] [address] [facility] [phone]   [facility]  [phone]   [address] [facility] [address] [facility]  [address] [facility] [address] [facility] [phone]

## 2021-06-16 NOTE — PROGRESS NOTES
Assessment/Plan:     Colon adenocarcinoma ([facility] [address] )  Stage 3 colon adenocarcinoma s/p laparoscopic left hemicolectomy on 6/3  She just received a call form her surgeon about seeing oncology to discuss chemotherapy    Benign essential hypertension  Remains controlled on HCTZ losartan and amlodipine    Anemia  Post op anemia  Labs ordered before visit in Sept    Hyperlipidemia  Controlled on atorvastatin         Problem List Items Addressed This Visit        Digestive    Colon adenocarcinoma [facility]) - Primary     Stage 3 colon adenocarcinoma s/p laparoscopic left hemicolectomy on 6/3  She just received a call form her surgeon about seeing oncology to discuss chemotherapy            Cardiovascular and Mediastinum    Benign essential hypertension     Remains controlled on HCTZ losartan and amlodipine            Other    Hyperlipidemia     Controlled on atorvastatin         Anemia     Post op anemia  Labs ordered before visit in Sept                Subjective:     Patient ID: Franchesca Gorman is a 67 y o  female  HPI  Laparoscopic left hemicolectomy on 6/3  She just received a call from her surgeon that it is stage 3 and she will need to see oncology to discuss chemo  Post op course unremarkable  She is eating well and denies abdominal pain  She has soft bowel movements    Review of Systems   Constitutional: Negative for chills and fever  HENT: Negative for rhinorrhea  Respiratory: Negative for cough and shortness of breath  Cardiovascular: Negative for chest pain, palpitations and leg swelling  Gastrointestinal: Negative for abdominal pain, constipation, diarrhea, nausea and vomiting  Genitourinary: Negative for difficulty urinating and dysuria  Objective:     Physical Exam  Constitutional:       General: She is not in acute distress  Appearance: She is well-developed  She is not ill-appearing, toxic-appearing or diaphoretic  HENT:      Head: Normocephalic and atraumatic     Eyes: Conjunctiva/sclera: Conjunctivae normal    Cardiovascular:      Rate and Rhythm: Normal rate and regular rhythm  Heart sounds: Normal heart sounds  No murmur heard  Pulmonary:      Effort: Pulmonary effort is normal  No respiratory distress  Breath sounds: Normal breath sounds  No wheezing or rales  Abdominal:      General: There is no distension  Palpations: Abdomen is soft  There is no mass  Tenderness: There is no abdominal tenderness  There is no guarding or rebound  Comments: Incisions clean dry without erythema dehiscence or drainage   Musculoskeletal:      Cervical back: Neck supple  Right lower leg: No edema  Left lower leg: No edema  Skin:     General: Skin is warm and dry  Neurological:      Mental Status: She is alert and oriented to person, place, and time  Psychiatric:         Mood and Affect: Mood normal          Behavior: Behavior normal          Thought Content: Thought content normal          Judgment: Judgment normal            Vitals:    06/16/21 1109   BP: 130/72   Pulse: 84   Temp: 97 7 °F (36 5 °C)   SpO2: 98%   Weight: 63 6 kg (140 lb 4 8 oz)   Height: 5' 4" (1 626 m)       Transitional Care Management Review: Peewee Dee is a 67 y o  female here for TCM follow up  During the TCM phone call patient stated:    TCM Call (since 5/16/2021)     Date and time call was made  6/7/2021 11:16 AM    Hospital care reviewed  Records not available    Patient was hospitialized at  [facility]        Date of Admission  06/03/21    Date of discharge  06/06/21    Diagnosis  Colon adenocarcinoma    Disposition  Home    Were the patients medications reviewed and updated  No    Current Symptoms  None      TCM Call (since 5/16/2021)     Post hospital issues  None    Should patient be enrolled in anticoag monitoring? No    Scheduled for follow up?   Yes    Did you obtain your prescribed medications  Yes    Do you need help managing your prescriptions or medications  No    Is transportation to your appointment needed  No    I have advised the patient to call PCP with any new or worsening symptoms  Ingrid Au MD

## 2021-06-16 NOTE — ASSESSMENT & PLAN NOTE
Stage 3 colon adenocarcinoma s/p laparoscopic left hemicolectomy on 6/3  She just received a call form her surgeon about seeing oncology to discuss chemotherapy

## 2021-06-17 NOTE — TELEPHONE ENCOUNTER
Discussed with navigator patient needs to be scheduled about 4 weeks post op, so in about 2 weeks  Patient on vacation 7/2-7/10/21  I offered patient date of 6/30 prior to her vacation, she declined and requested date for week of 7/12 -- patient scheduled 7/14 with Dr [facility] in [place]  Security here to go through pt's belongings.

## 2021-06-18 NOTE — PROGRESS NOTES
Phone outreach to patient today  Noted her consult with medical oncology was not until 7/14/21 but her surgery was on 6/3/21 so she should preferably be seen sooner than 7/14  Dana Iglesias explained that she has a vacation from 7/2-7/9 and does not want to undergo any treatment, etc , until she gets back  Reviewed with her that the consultation would be for purposes of explaining recommendations for treatment, ordering any other work up or procedures, and an opportunity to ask questions  Offered her a sooner appointment on 6/24/21 at 3:20 PM with Dr Rosa Llamas at [facility] which she accepted  She repeated that she wants to ask questions and gather information about treatment but does not want to do anything before she goes on vacation  Confirmed with her that at check out from the consultation she will be scheduled for visits, treatment, etc , after she returns from vacation  She tells me she is recovering well from surgery and is almost back to baseline  Her bowel function is different day to day but overall it is returning to normal as well  Provided her directions to the [facility] and my direct contact information in case she did not have it from previous conversations

## 2021-06-24 PROBLEM — T45.1X5A CHEMOTHERAPY INDUCED NEUTROPENIA (HCC): Status: ACTIVE | Noted: 2021-01-01

## 2021-06-24 PROBLEM — D70.1 CHEMOTHERAPY INDUCED NEUTROPENIA (HCC): Status: ACTIVE | Noted: 2021-01-01

## 2021-06-24 NOTE — H&P (VIEW-ONLY)
Oncology Outpatient Consult Note  Dannielle Mendez 67 y o  female MRN: @ Encounter: [id number]        Date:  6/24/2021        CC:   Pathologic T4b pathologic N1 B adenocarcinoma of the left colon with 2/38 lymph nodes positive for malignancy  HPI:  Dannielle Mendez is seen for initial consultation 6/24/2021 regarding   Newly diagnosed colon cancer  She had a stage III colon cancer  Based on the fact that this was a T4 lesion with 2 positive lymph nodes my recommendation would be 6 months of chemotherapy  As far symptoms are concerned she is recovered from surgery  She is feeling good  She is going on vacation for a week and will be back on the 9th of July and wishes to start chemotherapy after that  She is agreeable to considering chemotherapy after long discussion today about the risks benefits and alternatives  Denies any nausea denies any vomiting denies any diarrhea  The rest of her 14 point review of systems today was negative  Test Results:    Imaging: No results found  Labs:   Lab Results   Component Value Date    WBC 7 47 06/05/2021    HGB 10 7 (L) 06/05/2021    HCT 34 6 (L) 06/05/2021    MCV 77 (L) 06/05/2021     06/05/2021     Lab Results   Component Value Date     12/18/2015    K 3 8 06/06/2021     06/06/2021    CO2 23 06/06/2021    ANIONGAP 8 12/18/2015    BUN 8 06/06/2021    CREATININE 0 81 06/06/2021    GLUCOSE 93 12/18/2015    GLUF 102 (H) 05/10/2021    CALCIUM 9 8 06/06/2021    CORRECTEDCA 10 3 (H) 05/10/2021    AST 14 05/10/2021    ALT 20 05/10/2021    ALKPHOS 75 05/10/2021    PROT 6 8 12/18/2015    BILITOT 0 40 12/18/2015    EGFR 70 06/06/2021       Lab Results   Component Value Date    CEA 5 9 (H) 05/10/2021       Lab Results   Component Value Date    IRON 40 (L) 05/10/2021    TIBC 312 05/10/2021    FERRITIN 42 05/10/2021       ROS: As stated in history of present illness otherwise her 14 point review of systems today was negative      Active Problems: Patient Active Problem List   Diagnosis    Benign essential hypertension    Hyperlipidemia    Hypokalemia    Mild persistent asthma without complication    Non-toxic multinodular goiter    Anemia    Colon adenocarcinoma ([address] )       Past Medical History:   Past Medical History:   Diagnosis Date    Allergic rhinitis     last assessed: 12/12/2012    Asthma     Dysuria 5/14/2020    Hyperlipidemia     Hypertension     Hypokalemia     last assessed: 12/14/2012    Urine frequency 5/25/2016       Surgical History:   Past Surgical History:   Procedure Laterality Date    COLONOSCOPY  2009    COLPOSCOPY      DIAGNOSTIC LAPAROSCOPY  1987    CA LAP,SURG,COLECTOMY, PARTIAL, W/ANAST N/A 6/3/2021    Procedure: RESECTION COLON LEFT LAPAROSCOPIC;  Surgeon: Esther Cali MD;  [facility]: [facility];  Service: Colorectal    CA SIGMOIDOSCOPY FLX DX W/COLLJ SPEC BR/WA IF PFRMD N/A 6/3/2021    Procedure: Dasha Plummer;  Surgeon: Esther Cali MD;  [facility];  Service: Colorectal    TUBAL LIGATION         Family History:    Family History   Problem Relation Age of Onset    Lung cancer Mother 80    Asthma Family     Coronary artery disease Family     Thyroid disease Family     No Known Problems Father     No Known Problems Daughter     No Known Problems Maternal Grandmother     Cancer Maternal Grandfather         unknown type/age    No Known Problems Paternal Grandmother     No Known Problems Paternal Grandfather     No Known Problems Son     No Known Problems Maternal Aunt     No Known Problems Paternal Aunt        Cancer-related family history includes Cancer in her maternal grandfather; Lung cancer (age of onset: 80) in her mother      Social History:   Social History     Socioeconomic History    Marital status: /Civil Union     Spouse name: Not on file    Number of children: Not on file    Years of education: Not on file    Highest education level: Not on file Occupational History    Not on file   Tobacco Use    Smoking status: Former Smoker     Quit date:      Years since quittin 5    Smokeless tobacco: Never Used   Vaping Use    Vaping Use: Never used   Substance and Sexual Activity    Alcohol use: Yes     Alcohol/week: 2 0 standard drinks     Types: 2 Glasses of wine per week    Drug use: No    Sexual activity: Not on file   Other Topics Concern    Not on file   Social History Narrative    Not on file     Social Determinants of Health     Financial Resource Strain:     Difficulty of Paying Living Expenses:    Food Insecurity:     Worried About Running Out of Food in the Last Year:     [address] in the Last Year:    Transportation Needs:     Lack of Transportation (Medical):      Lack of Transportation (Non-Medical):    Physical Activity:     Days of Exercise per Week:     Minutes of Exercise per Session:    Stress:     Feeling of Stress :    Social Connections:     Frequency of Communication with Friends and Family:     Frequency of Social Gatherings with Friends and Family:     Attends Church Services:     Active Member of Clubs or Organizations:     Attends Club or Organization Meetings:     Marital Status:    Intimate Partner Violence:     Fear of Current or Ex-Partner:     Emotionally Abused:     Physically Abused:     Sexually Abused:        Current Medications:   Current Outpatient Medications   Medication Sig Dispense Refill    amLODIPine (NORVASC) 2 5 mg tablet TAKE 1 TABLET BY MOUTH EVERY DAY 90 tablet 2    amLODIPine (NORVASC) 5 mg tablet TAKE 1 TABLET DAILY ALONG WITH A 2 5 MG TABLET (Patient taking differently: every evening TAKE 1 TABLET DAILY ALONG WITH A 2 5 MG TABLET) 90 tablet 1    atorvastatin (LIPITOR) 40 mg tablet TAKE 1 TABLET BY MOUTH EVERY DAY (Patient taking differently: every evening ) 90 tablet 3    Calcium Carbonate-Vitamin D (CALCIUM 600+D) 600-200 MG-UNIT TABS Take 1 tablet by mouth daily  fluticasone (FLONASE) 50 mcg/act nasal spray SPRAY 1 SPRAY INTO EACH NOSTRIL EVERY DAY 48 mL 1    hydrochlorothiazide (HYDRODIURIL) 25 mg tablet TAKE 1 TABLET BY MOUTH EVERY DAY 90 tablet 1    Klor-Con M20 20 MEQ tablet TAKE 1 TABLET (20 MEQ TOTAL) BY MOUTH 2 (TWO) TIMES A  tablet 1    loratadine (CLARITIN) 10 mg tablet Take 1 tablet by mouth daily as needed      losartan (COZAAR) 100 MG tablet TAKE 1 TABLET BY MOUTH EVERY DAY 90 tablet 1    montelukast (SINGULAIR) 10 mg tablet TAKE 1 TABLET AT BEDTIME  90 tablet 3    Omega-3 Fatty Acids (Fish Oil) 1200 MG CAPS Take by mouth daily      Wixela Inhub 100-50 MCG/DOSE inhaler INHALE 1 PUFF BY MOUTH EVERY DAY 3 Inhaler 3     No current facility-administered medications for this visit  Allergies: Allergies   Allergen Reactions    Sulfa Antibiotics Other (See Comments) and Hives     Black spots under skin    Amoxicillin GI Intolerance         Physical Exam:    Body surface area is 1 68 meters squared  Wt Readings from Last 3 Encounters:   06/24/21 63 5 kg (140 lb)   06/16/21 63 6 kg (140 lb 4 8 oz)   06/03/21 64 9 kg (143 lb)        Temp Readings from Last 3 Encounters:   06/24/21 98 5 °F (36 9 °C) (Temporal)   06/16/21 97 7 °F (36 5 °C)   06/06/21 98 4 °F (36 9 °C)        BP Readings from Last 3 Encounters:   06/24/21 (!) 176/82   06/16/21 130/72   06/06/21 142/75         Pulse Readings from Last 3 Encounters:   06/24/21 92   06/16/21 84   06/06/21 81     Physical Exam     Constitutional   General appearance: No acute distress, well appearing and well nourished  Eyes   Conjunctiva and lids: No swelling, erythema or discharge  Pupils and irises: Equal, round and reactive to light  Ears, Nose, Mouth, and Throat   External inspection of ears and nose: Normal     Nasal mucosa, septum, and turbinates: Normal without edema or erythema  Oropharynx: Normal with no erythema, edema, exudate or lesions      Pulmonary   Respiratory effort: No increased work of breathing or signs of respiratory distress  Auscultation of lungs: Clear to auscultation  Cardiovascular   Palpation of heart: Normal PMI, no thrills  Auscultation of heart: Normal rate and rhythm, normal S1 and S2, without murmurs  Examination of extremities for edema and/or varicosities: Normal     Carotid pulses: Normal     Abdomen   Abdomen: Non-tender, no masses  Liver and spleen: No hepatomegaly or splenomegaly  Lymphatic   Palpation of lymph nodes in neck: No lymphadenopathy  Musculoskeletal   Gait and station: Normal     Digits and nails: Normal without clubbing or cyanosis  Inspection/palpation of joints, bones, and muscles: Normal     Skin   Skin and subcutaneous tissue: Normal without rashes or lesions  Neurologic   Cranial nerves: Cranial nerves 2-12 intact  Sensation: No sensory loss  Psychiatric   Orientation to person, place, and time: Normal     Mood and affect: Normal       Assessment/ Plan:      The patient is a pleasant 69-year-old female with newly diagnosed pathologic T4 N1 colon cancer with 2/38 lymph nodes positive for malignancy  Based on her stage with the T4 malignancy she needs 6 months of modified FOLFOX 6 in the adjuvant setting  I went over the rationale behind this  The patient is agreeable to proceeding  I explained the risks benefits and alternatives of chemotherapy  I explained the possible side effects to include but not limited to nausea, vomiting, diarrhea, abdominal pain, fatigue, low blood counts, risk of infection and even death  The patient is agreeable to proceeding  We will get her started on treatment as soon as it suits her schedule  We will have a port placed  She will sign a consent form today  Compazine will be called in to her pharmacy to have as needed  If she has any questions she will call our office  Goals and Barriers:  Current Goal: Prolong Survival from colon Cancer  Barriers: None  Patient's Capacity to Self Care:  Patient able to self care  Portions of the record may have been created with voice recognition software   Occasional wrong word or "sound a like" substitutions may have occurred due to the inherent limitations of voice recognition software   Read the chart carefully and recognize, using context, where substitutions have occurred

## 2021-06-24 NOTE — PROGRESS NOTES
Oncology Outpatient Consult Note  Sam Gan 67 y o  female MRN: @ Encounter: [id number]        Date:  6/24/2021        CC:   Pathologic T4b pathologic N1 B adenocarcinoma of the left colon with 2/38 lymph nodes positive for malignancy  HPI:  Sam Gan is seen for initial consultation 6/24/2021 regarding   Newly diagnosed colon cancer  She had a stage III colon cancer  Based on the fact that this was a T4 lesion with 2 positive lymph nodes my recommendation would be 6 months of chemotherapy  As far symptoms are concerned she is recovered from surgery  She is feeling good  She is going on vacation for a week and will be back on the 9th of July and wishes to start chemotherapy after that  She is agreeable to considering chemotherapy after long discussion today about the risks benefits and alternatives  Denies any nausea denies any vomiting denies any diarrhea  The rest of her 14 point review of systems today was negative  Test Results:    Imaging: No results found  Labs:   Lab Results   Component Value Date    WBC 7 47 06/05/2021    HGB 10 7 (L) 06/05/2021    HCT 34 6 (L) 06/05/2021    MCV 77 (L) 06/05/2021     06/05/2021     Lab Results   Component Value Date     12/18/2015    K 3 8 06/06/2021     06/06/2021    CO2 23 06/06/2021    ANIONGAP 8 12/18/2015    BUN 8 06/06/2021    CREATININE 0 81 06/06/2021    GLUCOSE 93 12/18/2015    GLUF 102 (H) 05/10/2021    CALCIUM 9 8 06/06/2021    CORRECTEDCA 10 3 (H) 05/10/2021    AST 14 05/10/2021    ALT 20 05/10/2021    ALKPHOS 75 05/10/2021    PROT 6 8 12/18/2015    BILITOT 0 40 12/18/2015    EGFR 70 06/06/2021       Lab Results   Component Value Date    CEA 5 9 (H) 05/10/2021       Lab Results   Component Value Date    IRON 40 (L) 05/10/2021    TIBC 312 05/10/2021    FERRITIN 42 05/10/2021       ROS: As stated in history of present illness otherwise her 14 point review of systems today was negative      Active Problems: Patient Active Problem List   Diagnosis    Benign essential hypertension    Hyperlipidemia    Hypokalemia    Mild persistent asthma without complication    Non-toxic multinodular goiter    Anemia    Colon adenocarcinoma ([address] )       Past Medical History:   Past Medical History:   Diagnosis Date    Allergic rhinitis     last assessed: 12/12/2012    Asthma     Dysuria 5/14/2020    Hyperlipidemia     Hypertension     Hypokalemia     last assessed: 12/14/2012    Urine frequency 5/25/2016       Surgical History:   Past Surgical History:   Procedure Laterality Date    COLONOSCOPY  2009    COLPOSCOPY      DIAGNOSTIC LAPAROSCOPY  1987    TN LAP,SURG,COLECTOMY, PARTIAL, W/ANAST N/A 6/3/2021    Procedure: RESECTION COLON LEFT LAPAROSCOPIC;  Surgeon: Danny Jose MD;  [facility];  Service: Colorectal    TN SIGMOIDOSCOPY FLX DX W/COLLJ SPEC BR/WA IF PFRMD N/A 6/3/2021    Procedure: Zenaida Habermann;  Surgeon: Danny Jose MD;  [facility];  Service: Colorectal    TUBAL LIGATION         Family History:    Family History   Problem Relation Age of Onset    Lung cancer Mother 80    Asthma Family     Coronary artery disease Family     Thyroid disease Family     No Known Problems Father     No Known Problems Daughter     No Known Problems Maternal Grandmother     Cancer Maternal Grandfather         unknown type/age    No Known Problems Paternal Grandmother     No Known Problems Paternal Grandfather     No Known Problems Son     No Known Problems Maternal Aunt     No Known Problems Paternal Aunt        Cancer-related family history includes Cancer in her maternal grandfather; Lung cancer (age of onset: 80) in her mother      Social History:   Social History     Socioeconomic History    Marital status: /Civil Union     Spouse name: Not on file    Number of children: Not on file    Years of education: Not on file    Highest education level: Not on file Occupational History    Not on file   Tobacco Use    Smoking status: Former Smoker     Quit date:      Years since quittin 5    Smokeless tobacco: Never Used   Vaping Use    Vaping Use: Never used   Substance and Sexual Activity    Alcohol use: Yes     Alcohol/week: 2 0 standard drinks     Types: 2 Glasses of wine per week    Drug use: No    Sexual activity: Not on file   Other Topics Concern    Not on file   Social History Narrative    Not on file     Social Determinants of Health     Financial Resource Strain:     Difficulty of Paying Living Expenses:    Food Insecurity:     Worried About Running Out of Food in the Last Year:     [address] in the Last Year:    Transportation Needs:     Lack of Transportation (Medical):      Lack of Transportation (Non-Medical):    Physical Activity:     Days of Exercise per Week:     Minutes of Exercise per Session:    Stress:     Feeling of Stress :    Social Connections:     Frequency of Communication with Friends and Family:     Frequency of Social Gatherings with Friends and Family:     Attends Christianity Services:     Active Member of Clubs or Organizations:     Attends Club or Organization Meetings:     Marital Status:    Intimate Partner Violence:     Fear of Current or Ex-Partner:     Emotionally Abused:     Physically Abused:     Sexually Abused:        Current Medications:   Current Outpatient Medications   Medication Sig Dispense Refill    amLODIPine (NORVASC) 2 5 mg tablet TAKE 1 TABLET BY MOUTH EVERY DAY 90 tablet 2    amLODIPine (NORVASC) 5 mg tablet TAKE 1 TABLET DAILY ALONG WITH A 2 5 MG TABLET (Patient taking differently: every evening TAKE 1 TABLET DAILY ALONG WITH A 2 5 MG TABLET) 90 tablet 1    atorvastatin (LIPITOR) 40 mg tablet TAKE 1 TABLET BY MOUTH EVERY DAY (Patient taking differently: every evening ) 90 tablet 3    Calcium Carbonate-Vitamin D (CALCIUM 600+D) 600-200 MG-UNIT TABS Take 1 tablet by mouth daily  fluticasone (FLONASE) 50 mcg/act nasal spray SPRAY 1 SPRAY INTO EACH NOSTRIL EVERY DAY 48 mL 1    hydrochlorothiazide (HYDRODIURIL) 25 mg tablet TAKE 1 TABLET BY MOUTH EVERY DAY 90 tablet 1    Klor-Con M20 20 MEQ tablet TAKE 1 TABLET (20 MEQ TOTAL) BY MOUTH 2 (TWO) TIMES A  tablet 1    loratadine (CLARITIN) 10 mg tablet Take 1 tablet by mouth daily as needed      losartan (COZAAR) 100 MG tablet TAKE 1 TABLET BY MOUTH EVERY DAY 90 tablet 1    montelukast (SINGULAIR) 10 mg tablet TAKE 1 TABLET AT BEDTIME  90 tablet 3    Omega-3 Fatty Acids (Fish Oil) 1200 MG CAPS Take by mouth daily      Wixela Inhub 100-50 MCG/DOSE inhaler INHALE 1 PUFF BY MOUTH EVERY DAY 3 Inhaler 3     No current facility-administered medications for this visit  Allergies: Allergies   Allergen Reactions    Sulfa Antibiotics Other (See Comments) and Hives     Black spots under skin    Amoxicillin GI Intolerance         Physical Exam:    Body surface area is 1 68 meters squared  Wt Readings from Last 3 Encounters:   06/24/21 63 5 kg (140 lb)   06/16/21 63 6 kg (140 lb 4 8 oz)   06/03/21 64 9 kg (143 lb)        Temp Readings from Last 3 Encounters:   06/24/21 98 5 °F (36 9 °C) (Temporal)   06/16/21 97 7 °F (36 5 °C)   06/06/21 98 4 °F (36 9 °C)        BP Readings from Last 3 Encounters:   06/24/21 (!) 176/82   06/16/21 130/72   06/06/21 142/75         Pulse Readings from Last 3 Encounters:   06/24/21 92   06/16/21 84   06/06/21 81     Physical Exam     Constitutional   General appearance: No acute distress, well appearing and well nourished  Eyes   Conjunctiva and lids: No swelling, erythema or discharge  Pupils and irises: Equal, round and reactive to light  Ears, Nose, Mouth, and Throat   External inspection of ears and nose: Normal     Nasal mucosa, septum, and turbinates: Normal without edema or erythema  Oropharynx: Normal with no erythema, edema, exudate or lesions      Pulmonary   Respiratory effort: No increased work of breathing or signs of respiratory distress  Auscultation of lungs: Clear to auscultation  Cardiovascular   Palpation of heart: Normal PMI, no thrills  Auscultation of heart: Normal rate and rhythm, normal S1 and S2, without murmurs  Examination of extremities for edema and/or varicosities: Normal     Carotid pulses: Normal     Abdomen   Abdomen: Non-tender, no masses  Liver and spleen: No hepatomegaly or splenomegaly  Lymphatic   Palpation of lymph nodes in neck: No lymphadenopathy  Musculoskeletal   Gait and station: Normal     Digits and nails: Normal without clubbing or cyanosis  Inspection/palpation of joints, bones, and muscles: Normal     Skin   Skin and subcutaneous tissue: Normal without rashes or lesions  Neurologic   Cranial nerves: Cranial nerves 2-12 intact  Sensation: No sensory loss  Psychiatric   Orientation to person, place, and time: Normal     Mood and affect: Normal       Assessment/ Plan:      The patient is a pleasant 39-year-old female with newly diagnosed pathologic T4 N1 colon cancer with 2/38 lymph nodes positive for malignancy  Based on her stage with the T4 malignancy she needs 6 months of modified FOLFOX 6 in the adjuvant setting  I went over the rationale behind this  The patient is agreeable to proceeding  I explained the risks benefits and alternatives of chemotherapy  I explained the possible side effects to include but not limited to nausea, vomiting, diarrhea, abdominal pain, fatigue, low blood counts, risk of infection and even death  The patient is agreeable to proceeding  We will get her started on treatment as soon as it suits her schedule  We will have a port placed  She will sign a consent form today  Compazine will be called in to her pharmacy to have as needed  If she has any questions she will call our office  Goals and Barriers:  Current Goal: Prolong Survival from colon Cancer  Barriers: None  Patient's Capacity to Self Care:  Patient able to self care  Portions of the record may have been created with voice recognition software   Occasional wrong word or "sound a like" substitutions may have occurred due to the inherent limitations of voice recognition software   Read the chart carefully and recognize, using context, where substitutions have occurred

## 2021-06-24 NOTE — PROGRESS NOTES
Met with patient during her visit today with Dr Janet Fernandez  The FOLFOX regimen was recommended by Dr Janet Fernandez who performed the chemo teach and consent was obtained  I Provided her with printed materials from [address] on the FOLFOX regimen and Neulasta OnPro, port placement, nutrition during chemotherapy and nutrition tips for patients with colon cancer  Also provided her the Eating Hints and Chemotherapy and You books from the [address]  Reviewed the use of the [facility] [place] for symptom and side effect management  Unfortunately at time of check out the infusion center and IR departments were closed so the office will follow up with her to schedule the port placement and the chemo start date  Patient is going on vacation from July 2-July 9 and wishes to have everything done after that trip  Advised I would call her upon her return to re-review everything discussed today and ensure she is set up appropriately with her appointments  She has my contact information and was encouraged to call with questions or concerns

## 2021-06-28 NOTE — TELEPHONE ENCOUNTER
Spoke with Boy Trejo  Her appointments were finalized  She will be starting treatment on 7/21/21 and will have her port placed on 7/14/21  I offered to call her again tomorrow, once she had a change to review all the appointments, incase she had any questions  She explained that she would be reaching out to her nurse navigator

## 2021-06-30 NOTE — PROGRESS NOTES
After Visit Summary   10/26/2020    Darl Meigs    MRN: [id number]           Visit Information     Date & Time  10/26/2020 10:00 AM Provider  [facility]  [address] [place] [address], [facility]  Phone  [phone] Patient called yesterday, 6/29/21, questioning the date of her port placement  She sees two dates on her MyChart and she received a call from the [facility] to schedule the pre-procedural call for the 7/13 appointment  She prefers 7/14 at [place]  Contacted IR yesterday and requested cancelling 7/13 procedure and keeping 7/14 at [place]  Received confirmation of same  Returned call to Elton Ordaz today to inform her that the 7/13 procedure is cancelled and she should be hearing from the [facility] [facility] for the pre-procedural call for the 7/14/21 date  Elton Ordaz was appreciative of the assistance  She will be out of town next week and we will touch haseebk again the week of 7/12/21 to go over any questions she may have about the treatment plan at that time  HPV HIGH RISK , THIN PREP COLLECTION [[id number] CUSTOM]  10/26/2020 10/26/2021    [facility] REJI 2D+3D SCREENING BILAT (CPT=77067/75464) [COMBO CPT(R)]  10/26/2020 (Approximate) 10/26/2021    THINPREP PAP SMEAR B [[id number] CUSTOM]  10/26/2020 10/26/2021      Futur Visit face-to-face with a [facility] physician or   JAMEEL using your mobile device or computer   using [address] with a [facility] Physician or JAMEEL online. The physician will respond and provide   a treatment plan within a few hours.  ONLINE VISIT

## 2021-07-09 NOTE — PRE-PROCEDURE INSTRUCTIONS
IR pre procedure instructions reviewed with Deepthi Friend  Short stay to call the night before with exact arrival time  NPO after midnight, meds with sip of water  Incision care reviewed   to drive home  All questions answered  Amoxicillin intolerance only if she takes pill on an empty stomach per Deepthi Friend  She said she has tolerated it since if she takes it with food  Per Carlos Velazquez in pharmacy this is not a contraindication to IV Ancef

## 2021-07-14 NOTE — SEDATION DOCUMENTATION
Right Chest Port Placed by Dr Keanu Montenegro  Right chest port via Right Subclavian Vein  Tip is in SVC  Ok to [employer]  as per Dr Keanu Montenegro  VSS  Denies pain or nausea at this time   Report given to Venu Jewell RN

## 2021-07-14 NOTE — INTERVAL H&P NOTE
The history and physical were reviewed, along with progress notes, and the patient was examined  There are no changes since it was written      /67 (BP Location: Right arm)   Pulse 69   Temp 97 8 °F (36 6 °C) (Skin)   Resp 18   LMP  (LMP Unknown)   SpO2 98%        Attila Phillips MD/July 14, 2021/10:44 AM

## 2021-07-14 NOTE — BRIEF OP NOTE (RAD/CATH)
INTERVENTIONAL RADIOLOGY PROCEDURE NOTE    Date: 7/14/2021    Procedure: IR PORT PLACEMENT    Preoperative diagnosis:   1  Colon adenocarcinoma ([address] )    2  Chemotherapy induced neutropenia (HCC)         Postoperative diagnosis: Same  Surgeon: Rosemary Miller MD     Assistant: None  No qualified resident was available  Blood loss:  2 mL    Specimens:  None     Findings:  Large thyroid nodule protruding into the mediastinum deviating mediastinal structures including the trachea and innominate vein  Unable to place port from right internal jugular vein due to tortuosity  Successful placement of a right subclavian port  Tip in RA, okay to use  Complications: None immediate      Anesthesia: conscious sedation

## 2021-07-14 NOTE — DISCHARGE INSTRUCTIONS
Implanted Venous Access Port     WHAT YOU NEED TO KNOW:   An implanted venous access port is a device used to give treatments and take blood  It may also be called a central venous access device (CVAD)  The port is a small container that is placed under your skin, usually in your upper chest  The port is attached to a catheter that enters a large vein  DISCHARGE INSTRUCTIONS:   Resume your normal diet  Small sips of flat soda will help with mild nausea  Prevent an infection:   · Wash your hands often  Use soap and water  Clean your hands before and after you care for your port  Remind everyone who cares for your port to wash their hands  · Check your skin for infection every day  Look for redness, swelling, or fluid oozing from the port site  Care for your port:   1  You may shower beginning 48 hours after procedure  2  Change dressing if it becomes wet  3  Remove dressing after 24 hours  Leave glue in place  4  It is normal for some bruising to occur  5  Use Tylenol for pain  6  Limit use of arm on the side that your port was placed  Lift nothing heavier than 5 pounds for 1 week, and then gradually increase activity as tolerated  7  DO NOT apply ointment, lotion or cream to port site until incision is healed  Allow glue to fall off  DO NOT attempt to peel glue from skin even it it begins to flake  8, After the port incision is healed you may swim, bathe  Notify the Interventional Radiologist if you have any of the followin  Fever above 101 F    2  Increased redness or swelling after 1st day  3  Increased pain after 1st day  4  Any sign of infection (drainage from port site, skin separation, hot to touch)  5  Persistent nausea or vomiting  Contact Interventional Radiology at [phone] [facility] PATIENTS: Contact Interventional Radiology at [phone]) ([address] [facility] [address]: Contact Interventional Radiology at [phone])      Procedural Sedation   WHAT YOU NEED TO KNOW:   Procedural sedation is medicine used during procedures to help you feel relaxed and calm  You will remember little to none of the procedure  After sedation you may feel tired, weak, or unsteady on your feet  You may also have trouble concentrating or short-term memory loss  These symptoms should go away in 24 hours or less  DISCHARGE INSTRUCTIONS:   Call 911 or have someone else call for any of the following:   · You have sudden trouble breathing      · You cannot be woken  ·    Contact Interventional Radiology at [phone] PATIENTS: Contact Interventional Radiology at [phone] [facility] PATIENTS: Contact Interventional Radiology at [phone]) if any of the following occur:      · You have a severe headache or dizziness      · Your heart is beating faster than usual     · You have a fever or chills      · Your skin is itchy, swollen, or you have a rash      · You have nausea or are vomiting for more than 8 hours after the procedure       · You have questions or concerns about your condition or care  Self-care:   · Have someone stay with you for 24 hours  This person can drive you to errands and help you do things around the house  This person can also watch for problems       · Rest and do quiet activities for 24 hours  Do not exercise, ride a bike, or play sports  Stand up slowly to prevent dizziness and falls  Take short walks around the house with another person  Slowly return to your usual activities the next day       · Do not drive or use dangerous machines or tools for 24 hours  You may injure yourself or others  Examples include a lawnmower, saw, or drill  Do not return to work for 24 hours if you use dangerous machines or tools for work       · Do not make important decisions for 24 hours  For example, do not sign important papers or invest money       · Drink liquids as directed  Liquids help flush the sedation medicine out of your body   Ask how much liquid to drink each day and which liquids are best for you       · Eat small, frequent meals to prevent nausea and vomiting  Start with clear liquids such as juice or broth  If you do not vomit after clear liquids, you can eat your usual foods       · Do not drink alcohol or take medicines that make you drowsy  This includes medicines that help you sleep and anxiety medicines  Ask your healthcare provider if it is safe for you to take pain medicine  Follow up with your healthcare provider as directed: Write down your questions so you remember to ask them during your visits

## 2021-07-19 NOTE — PROGRESS NOTES
Phone outreach to patient today in anticipation of chemo start on Wednesday 7/21/21  Reviewed treatment and side effects to watch for  She tells me her port insertion site is still bruised and it sticks out quite a bit  No redness or swelling reported  Confirmed she picked up the anti-emetic that Dr Gem Davila prescribed  Reviewed instructions for use if needed  Patient had her labs drawn today and understands she is to get labs drawn every two weeks prior to cycle start  She has questions about how to manage the pump at home and I answered her questions to the best of my ability, advised the infusion nurses will review in more detail and even show a video to ensure she is comfortable before she goes home with it  She has the number for the [employer] and reviewed use of that for symptom and side effect reporting  She has my contact information as well and was encouraged to call with questions or concerns

## 2021-07-21 NOTE — PROGRESS NOTES
Pt resting with no complaints  This is her first visit to center  Orientation to unit given  vitals stable, labs within parameters for treatment, call bell within reach  All questions answered and emotional support given  Will continue to monitor

## 2021-07-21 NOTE — PROGRESS NOTES
Pt tolerated treatment well  Good blood return noted before, during and after chemo  CADD pump connected per protocol after 2 RN double checks  Pt knows to return 7/23/21 at [phone]  Pt states that she has hair appointment at 5 and will come as soon as that is over  Pump teaching complete  All questions answered and emotional support given  Aware of next appt  AVS declined

## 2021-07-21 NOTE — PROGRESS NOTES
LSW met with pt in [facility] during new pts treatment  LSW provided contact information, information on Cancer support community and support  Pt stated she is not having any concerns or issues at this time  LSW encouraged pt to reach out if needs arise, pt agreed

## 2021-07-21 NOTE — PLAN OF CARE
Problem: Potential for Falls  Goal: Patient will remain free of falls  Description: INTERVENTIONS:  - Educate patient/family on patient safety including physical limitations  - Instruct patient to call for assistance with activity   - Consult OT/PT to assist with strengthening/mobility   - Keep Call bell within reach  - Keep bed low and locked with side rails adjusted as appropriate  - Keep care items and personal belongings within reach  - Initiate and maintain comfort rounds    Outcome: Progressing

## 2021-07-23 PROBLEM — R11.2 NAUSEA WITH VOMITING: Status: ACTIVE | Noted: 2021-01-01

## 2021-07-23 PROBLEM — N30.90 CYSTITIS: Status: ACTIVE | Noted: 2021-01-01

## 2021-07-23 PROBLEM — R77.8 ELEVATED TROPONIN LEVEL NOT DUE MYOCARDIAL INFARCTION: Status: ACTIVE | Noted: 2021-01-01

## 2021-07-23 PROBLEM — R06.02 SOB (SHORTNESS OF BREATH): Status: ACTIVE | Noted: 2021-01-01

## 2021-07-23 NOTE — PROGRESS NOTES
Pt here for CADD pump disconnect  Pt very short of breath with any exertion  This is new  Vitals WNL, pO2 sat 99% on RA  Pt stated that she has been sick since around 1300 7/22/21  Denies vomiting but had lots of dry heaving and only 12 oz fluid since that time  Denies dizziness  Pt has asthma but she states this feels differently and that asthma has been very well controlled  CADD pump reservoir volume 0, disconnected, good blood return noted from port and port saline locked  Call placed to Denisse Peña RN  Pt to proceed to ER  Pt placed in wheel chair and taken to ER  Pt aware that she does not need to come back to infusion center after release from ER  She verbalized understanding  All questions answered and emotional support given

## 2021-07-23 NOTE — ED PROVIDER NOTES
History  Chief Complaint   Patient presents with    Shortness of Breath     pt c/o SOB and nausea s/p first chemo tx on Wed     Patient is a 41-year-old female with history of hypertension, hyperlipidemia concurrently being treated for colon cancer who presents to the emergency department for evaluation of nausea and shortness of breath  The patient states that she got her 1st chemotherapy infusion on Wednesday  She states that last night she developed nausea, and she feels short of breath with exertion  She states at rest, she does not feel short of breath  She denies any current nausea  She states that she called her doctor, and they ultimately recommended that she come to the emergency department for further evaluation  The patient denies any associated chest pain  He denied taking her symptoms prior to coming in  She denies any history of blood clots  She additionally denies fever, chills, cough, abdominal pain, vomiting, diarrhea, constipation, headache, dizziness or lightheadedness  Dr Prince Dax the patient's oncologist       History provided by:  Patient   used: No    Shortness of Breath  Associated symptoms: no abdominal pain, no chest pain, no cough, no ear pain, no fever, no headaches, no neck pain, no rash, no sore throat and no vomiting        Prior to Admission Medications   Prescriptions Last Dose Informant Patient Reported? Taking?    Calcium Carbonate-Vitamin D (CALCIUM 600+D) 600-200 MG-UNIT TABS 7/22/2021 at Unknown time Self Yes Yes   Sig: Take 1 tablet by mouth daily   Klor-Con M20 20 MEQ tablet 7/22/2021 at Unknown time  No Yes   Sig: TAKE 1 TABLET (20 MEQ TOTAL) BY MOUTH 2 (TWO) TIMES A DAY   Omega-3 Fatty Acids (Fish Oil) 1200 MG CAPS 7/22/2021 at Unknown time  Yes Yes   Sig: Take by mouth daily   Wixela Inhub 100-50 MCG/DOSE inhaler 7/22/2021 at Unknown time  No Yes   Sig: INHALE 1 PUFF BY MOUTH EVERY DAY   amLODIPine (NORVASC) 2 5 mg tablet 7/22/2021 at Unknown time No Yes   Sig: TAKE 1 TABLET BY MOUTH EVERY DAY   amLODIPine (NORVASC) 5 mg tablet 7/22/2021 at Unknown time  No Yes   Sig: TAKE 1 TABLET DAILY ALONG WITH A 2 5 MG TABLET   atorvastatin (LIPITOR) 40 mg tablet 7/22/2021 at Unknown time Self No Yes   Sig: TAKE 1 TABLET BY MOUTH EVERY DAY   Patient taking differently: every evening    fluorouracil 4,030 mg in CADD infusion pump   No No   Sig: Infuse 4,030 mg (1,200 mg/m2/day x 1 68 m2 (Treatment Plan Recorded BSA)) into a venous catheter over 46 hours for 2 days   fluticasone (FLONASE) 50 mcg/act nasal spray 7/22/2021 at Unknown time  No Yes   Sig: SPRAY 1 SPRAY INTO EACH NOSTRIL EVERY DAY   hydrochlorothiazide (HYDRODIURIL) 25 mg tablet 7/23/2021 at Unknown time  No Yes   Sig: TAKE 1 TABLET BY MOUTH EVERY DAY   loratadine (CLARITIN) 10 mg tablet 7/23/2021 at Unknown time Self Yes Yes   Sig: Take 1 tablet by mouth daily as needed   losartan (COZAAR) 100 MG tablet 7/23/2021 at Unknown time  No Yes   Sig: TAKE 1 TABLET BY MOUTH EVERY DAY   montelukast (SINGULAIR) 10 mg tablet 7/22/2021 at Unknown time  No Yes   Sig: TAKE 1 TABLET AT BEDTIME    prochlorperazine (COMPAZINE) 10 mg tablet 7/23/2021 at Unknown time  No Yes   Sig: Take 1 tablet (10 mg total) by mouth every 6 (six) hours as needed for nausea or vomiting      Facility-Administered Medications: None       Past Medical History:   Diagnosis Date    Allergic rhinitis     last assessed: 12/12/2012    Asthma     [facility] [address] [facility]     Dysuria 5/14/2020    Hyperlipidemia     Hypertension     Hypokalemia     last assessed: 12/14/2012    Urine frequency 5/25/2016       Past Surgical History:   Procedure Laterality Date    COLONOSCOPY  2009    COLPOSCOPY      DIAGNOSTIC LAPAROSCOPY  1987    IR PORT PLACEMENT  7/14/2021    OK LAP,SURG,COLECTOMY, PARTIAL, W/ANAST N/A 6/3/2021    Procedure: RESECTION COLON LEFT LAPAROSCOPIC;  Surgeon: Gerald Bond MD;  [facility]: [facility];  Service: Colorectal    OK SIGMOIDOSCOPY FLX DX W/COLLJ SPEC BR/WA IF PFRMD N/A 6/3/2021    Procedure: Lauren Garduno;  Surgeon: Kiel Alexandre MD;  Location:  MAIN OR;  Service: Colorectal    TUBAL LIGATION         Family History   Problem Relation Age of Onset    Lung cancer Mother 80    Asthma Family     Coronary artery disease Family     Thyroid disease Family     No Known Problems Father     No Known Problems Daughter     No Known Problems Maternal Grandmother     Cancer Maternal Grandfather         unknown type/age    No Known Problems Paternal Grandmother     No Known Problems Paternal Grandfather     No Known Problems Son     No Known Problems Maternal Aunt     No Known Problems Paternal Aunt      I have reviewed and agree with the history as documented  E-Cigarette/Vaping    E-Cigarette Use Never User      E-Cigarette/Vaping Substances    Nicotine No     THC No     CBD No     Flavoring No     Other No     Unknown No      Social History     Tobacco Use    Smoking status: Former Smoker     Quit date:      Years since quittin 5    Smokeless tobacco: Never Used   Vaping Use    Vaping Use: Never used   Substance Use Topics    Alcohol use: Yes     Alcohol/week: 2 0 standard drinks     Types: 2 Glasses of wine per week     Comment: socially     Drug use: No       Review of Systems   Constitutional: Negative for chills and fever  HENT: Negative for ear pain and sore throat  Eyes: Negative for redness and visual disturbance  Respiratory: Positive for shortness of breath  Negative for cough  Cardiovascular: Negative for chest pain  Gastrointestinal: Positive for nausea  Negative for abdominal pain, diarrhea and vomiting  Genitourinary: Negative for dysuria and hematuria  Musculoskeletal: Negative for back pain, neck pain and neck stiffness  Skin: Negative for color change and rash  Neurological: Negative for dizziness, light-headedness and headaches     All other systems reviewed and are negative  Physical Exam  Physical Exam  Vitals and nursing note reviewed  Constitutional:       General: She is not in acute distress  Appearance: She is well-developed  She is not ill-appearing or toxic-appearing  HENT:      Head: Normocephalic and atraumatic  Mouth/Throat:      Pharynx: Uvula midline  Eyes:      General: Lids are normal       Conjunctiva/sclera: Conjunctivae normal    Cardiovascular:      Rate and Rhythm: Normal rate and regular rhythm  Heart sounds: Normal heart sounds  Pulmonary:      Effort: Pulmonary effort is normal       Breath sounds: Normal breath sounds  Abdominal:      General: There is no distension  Palpations: Abdomen is soft  Tenderness: There is no abdominal tenderness  Musculoskeletal:      Cervical back: Normal range of motion and neck supple  Skin:     General: Skin is warm and dry  Neurological:      Mental Status: She is alert and oriented to person, place, and time           Vital Signs  ED Triage Vitals   Temperature Pulse Respirations Blood Pressure SpO2   07/23/21 1034 07/23/21 1034 07/23/21 1034 07/23/21 1034 07/23/21 1034   98 5 °F (36 9 °C) 86 18 119/57 97 %      Temp Source Heart Rate Source Patient Position - Orthostatic VS BP Location FiO2 (%)   07/23/21 1034 07/23/21 1034 07/23/21 1254 07/23/21 1254 --   Oral Monitor Lying Right arm       Pain Score       --                  Vitals:    07/23/21 1034 07/23/21 1254 07/23/21 1300   BP: 119/57 110/56 110/56   Pulse: 86 82 78   Patient Position - Orthostatic VS:  Lying          Visual Acuity      ED Medications  Medications   heparin (porcine) 25,000 units in 0 45% NaCl 250 mL infusion (premix) (18 Units/kg/hr × 60 kg (Order-Specific) Intravenous New Bag 7/23/21 1251)   heparin (porcine) injection 4,800 Units (has no administration in time range)   heparin (porcine) injection 2,400 Units (has no administration in time range)   atorvastatin (LIPITOR) tablet 40 mg (has no administration in time range)   calcium carbonate-vitamin D (OSCAL-D) 500 mg-200 units per tablet 1 tablet (has no administration in time range)   fluticasone (FLONASE) 50 mcg/act nasal spray 1 spray (has no administration in time range)   montelukast (SINGULAIR) tablet 10 mg (has no administration in time range)   fish oil capsule 1,000 mg (has no administration in time range)   fluticasone-vilanterol (BREO ELLIPTA) 100-25 mcg/inh inhaler 1 puff (has no administration in time range)   ondansetron (ZOFRAN) injection 4 mg (has no administration in time range)   acetaminophen (TYLENOL) tablet 650 mg (has no administration in time range)   multi-electrolyte (PLASMALYTE-A/ISOLYTE-S PH 7 4) IV solution (has no administration in time range)   ondansetron (ZOFRAN) injection 4 mg (4 mg Intravenous Given 7/23/21 1124)   sodium chloride 0 9 % bolus 500 mL (0 mL Intravenous Stopped 7/23/21 1250)   heparin (porcine) injection 4,800 Units (4,800 Units Intravenous Given 7/23/21 1250)       Diagnostic Studies  Results Reviewed     Procedure Component Value Units Date/Time    UA (URINE) with reflex to Scope [[id number]]     Lab Status: No result Specimen: Urine     Urinalysis with microscopic [[id number]]     Lab Status: No result Specimen: Urine, Clean Catch     Urine Microscopic [[id number]]  (Abnormal) Collected: 07/23/21 1441    Lab Status: Final result Specimen: Urine Updated: 07/23/21 1506     RBC, UA 0-1 /hpf      WBC, UA 10-20 /hpf      Epithelial Cells Occasional /hpf      Bacteria, UA Occasional /hpf     Urine culture [[id number]] Collected: 07/23/21 1441    Lab Status:  In process Specimen: Urine Updated: 07/23/21 1506    Troponin I [[id number]]  (Abnormal) Collected: 07/23/21 1437    Lab Status: Final result Specimen: Blood from Arm, Left Updated: 07/23/21 1501     Troponin I 0 49 ng/mL     Urine Macroscopic, POC [[id number]]  (Abnormal) Collected: 07/23/21 1441    Lab Status: Final result Specimen: Urine Updated: 07/23/21 1443     Color, UA Yellow     Clarity, UA Clear     pH, UA 6 0     Leukocytes, UA Trace     Nitrite, UA Negative     Protein, UA 30 (1+) mg/dl      Glucose, UA Negative mg/dl      Ketones, UA Trace mg/dl      Urobilinogen, UA 0 2 E U /dl      Bilirubin, UA Negative     Blood, UA Trace     Specific Gravity, UA >=1 030    Narrative:      CLINITEK RESULT    NT-BNP PRO [[id number]]  (Abnormal) Collected: 07/23/21 1122    Lab Status: Final result Specimen: Blood from Arm, Left Updated: 07/23/21 1159     NT-proBNP 6,660 pg/mL     Comprehensive metabolic panel [[id number]]  (Abnormal) Collected: 07/23/21 1122    Lab Status: Final result Specimen: Blood from Arm, Left Updated: 07/23/21 1153     Sodium 135 mmol/L      Potassium 3 4 mmol/L      Chloride 98 mmol/L      CO2 20 mmol/L      ANION GAP 17 mmol/L      BUN 30 mg/dL      Creatinine 1 62 mg/dL      Glucose 136 mg/dL      Calcium 9 3 mg/dL      AST 30 U/L      ALT 38 U/L      Alkaline Phosphatase 60 U/L      Total Protein 6 9 g/dL      Albumin 3 5 g/dL      Total Bilirubin 0 60 mg/dL      eGFR 30 ml/min/1 73sq m     Narrative:      [place] guidelines for Chronic Kidney Disease (CKD):     Stage 1 with normal or high GFR (GFR > 90 mL/min/1 73 square meters)    Stage 2 Mild CKD (GFR = 60-89 mL/min/1 73 square meters)    Stage 3A Moderate CKD (GFR = 45-59 mL/min/1 73 square meters)    Stage 3B Moderate CKD (GFR = 30-44 mL/min/1 73 square meters)    Stage 4 Severe CKD (GFR = 15-29 mL/min/1 73 square meters)    Stage 5 End Stage CKD (GFR <15 mL/min/1 73 square meters)  Note: GFR calculation is accurate only with a steady state creatinine    Troponin I [[id number]]  (Abnormal) Collected: 07/23/21 1122    Lab Status: Final result Specimen: Blood from Arm, Left Updated: 07/23/21 1153     Troponin I 0 52 ng/mL     D-Dimer [[id number]]  (Abnormal) Collected: 07/23/21 1122    Lab Status: Final result Specimen: Blood from Arm, Left Updated: 07/23/21 1148     D-Dimer, Quant 2 75 ug/ml FEU     Protime-INR [[id number]]  (Normal) Collected: 07/23/21 1122    Lab Status: Final result Specimen: Blood from Arm, Left Updated: 07/23/21 1145     Protime 12 9 seconds      INR 0 96    APTT [[id number]]  (Normal) Collected: 07/23/21 1122    Lab Status: Final result Specimen: Blood from Arm, Left Updated: 07/23/21 1145     PTT 26 seconds     CBC and differential [[id number]]  (Abnormal) Collected: 07/23/21 1122    Lab Status: Final result Specimen: Blood from Arm, Left Updated: 07/23/21 1135     WBC 7 66 Thousand/uL      RBC 5 04 Million/uL      Hemoglobin 12 7 g/dL      Hematocrit 39 7 %      MCV 79 fL      MCH 25 2 pg      MCHC 32 0 g/dL      RDW 18 9 %      MPV 10 7 fL      Platelets 763 Thousands/uL      nRBC 0 /100 WBCs      Neutrophils Relative 76 %      Immat GRANS % 0 %      Lymphocytes Relative 17 %      Monocytes Relative 7 %      Eosinophils Relative 0 %      Basophils Relative 0 %      Neutrophils Absolute 5 82 Thousands/µL      Immature Grans Absolute 0 02 Thousand/uL      Lymphocytes Absolute 1 28 Thousands/µL      Monocytes Absolute 0 53 Thousand/µL      Eosinophils Absolute 0 00 Thousand/µL      Basophils Absolute 0 01 Thousands/µL                  XR chest 1 view portable   Final Result by Tiffanie Guzman MD (07/23 1151)      No acute cardiopulmonary disease  Redemonstration of deviation of the trachea to the left by the large mediastinal mass  Workstation performed: [id number]                    Procedures  ECG 12 Lead Documentation Only    Date/Time: 7/23/2021 11:13 AM  Performed by: Yandel Ivey PA-C  Authorized by: Celina Weaver PA-C     Comments:      Normal sinus rhythm at 79  Normal axis  No acute ST-T changes  No significant change noted from previous on 07/26/1995                 ED Course  ED Course as of Jul 23 1609 Fri Jul 23, 2021   1152 D-Dimer, Quant(!): 2 75   1157 Troponin I(!): 0 52   1157 TOMER Creatinine(!): 1 62   1159 Patient with elevated D-dimer and troponin  Patient also has an TOMER with a GFR of 30  I feel the safest thing at this time is to admit the patient for a V/Q scan  Will speak with SLIM regarding giving a dose of Lovenox in the meantime  1222 NT-proBNP(!): 6,660   1233 Platelet Count: 102   1233 High-dose heparin ordered  MDM  Number of Diagnoses or Management Options  TOMER (acute kidney injury) [facility]): new and requires workup  Dyspnea on exertion: new and requires workup  Elevated d-dimer: new and requires workup  Elevated troponin: new and requires workup  Diagnosis management comments: Patient presents for evaluation of nausea and shortness of breath that started last night  Patient had her 1st chemotherapy treatment on Wednesday for colon cancer  Differential includes but is not limited to chemotherapy side effects versus pulmonary embolism versus pneumonia versus ACS versus CHF versus cardiomyopathy  Labs, imaging and ECG ordered  Labs reviewed  Notable for elevated troponin as well as elevated D-dimer  Additionally, the patient has an TOMER with a GFR of 30  Do not feel that getting a CTA with IV contrast would be the best for the patient at this time  I feel that a V/Q scan would be more appropriate  Chest x-ray does not show any acute disease  ECG does not show any acute ischemic change  I advised the patient that I would like to admit her today for further evaluation to rule out pulmonary embolism as well as address the TOMER  She is agreeable  Case was discussed with LEONOR who agreed to accept the patient under the service of Dr Ruthann Shabazz  We additionally discussed a heparin drip, and a half high-dose heparin drip was ordered at this time  I discussed the heparin drip with the patient, and I verified she did not have any contraindications regarding heparin  Patient is stable for admission         Amount and/or Complexity of Data Reviewed  Clinical lab tests: ordered and reviewed  Tests in the radiology section of CPT®: reviewed and ordered  Decide to obtain previous medical records or to obtain history from someone other than the patient: yes  Review and summarize past medical records: yes  Discuss the patient with other providers: yes    Risk of Complications, Morbidity, and/or Mortality  Presenting problems: high  Diagnostic procedures: high  Management options: high    Patient Progress  Patient progress: stable      Disposition  Final diagnoses:   Dyspnea on exertion   Elevated d-dimer   TOMER (acute kidney injury) ([facility] )   Elevated troponin     Time reflects when diagnosis was documented in both MDM as applicable and the Disposition within this note     Time User Action Codes Description Comment    7/23/2021 12:32 PM Celina Steele Add [R06 00] Dyspnea on exertion     7/23/2021 12:32 PM Elo Covert Add [R79 89] Elevated d-dimer     7/23/2021 12:32 PM Celina Steele Add [N17 9] TOMER (acute kidney injury) ([facility] )     7/23/2021 12:32 PM Elo Covert Add [R77 8] Elevated troponin       ED Disposition     ED Disposition Condition Date/Time Comment    Admit Stable Fri Jul 23, 2021 12:32 PM Case was discussed with LEONOR and the patient's admission status was agreed to be Admission Status: inpatient status to the service of Dr Elliot Singh   Follow-up Information    None         Patient's Medications   Discharge Prescriptions    No medications on file     No discharge procedures on file      PDMP Review       Value Time User    PDMP Reviewed  Yes 7/23/2021  3:55 PM Adela Cadet MD          ED Provider  Electronically Signed by           Guru Pettit PA-C  07/23/21 6576

## 2021-07-24 PROBLEM — D72.829 LEUKOCYTOSIS: Status: ACTIVE | Noted: 2021-01-01

## 2021-07-24 PROBLEM — I26.99 PULMONARY EMBOLI (HCC): Status: ACTIVE | Noted: 2021-01-01

## 2021-07-24 PROBLEM — J96.01 ACUTE RESPIRATORY FAILURE WITH HYPOXIA (HCC): Status: ACTIVE | Noted: 2021-01-01

## 2021-07-24 PROBLEM — I46.9 CARDIAC ARREST (HCC): Status: ACTIVE | Noted: 2021-01-01

## 2021-07-24 PROBLEM — D72.825 BANDEMIA: Status: ACTIVE | Noted: 2021-01-01

## 2021-07-24 PROBLEM — E87.2 METABOLIC ACIDOSIS: Status: ACTIVE | Noted: 2021-01-01

## 2021-07-24 PROBLEM — R57.0 CARDIOGENIC SHOCK (HCC): Status: ACTIVE | Noted: 2021-01-01

## 2021-07-24 PROBLEM — N17.9 AKI (ACUTE KIDNEY INJURY) (HCC): Status: ACTIVE | Noted: 2021-01-01

## 2021-07-24 NOTE — PROGRESS NOTES
18-year-old female with history of hypertension, hyperlipidemia, recently diagnosed stage III adenocarcinoma of the colon with tumor resection 4 weeks ago on FOLFOX infusions presenting for shortness of breath  On admission patient had elevated BNP and troponin of 0 52  There was concern for pulmonary embolism however due to elevated creatinine of 1 62 up from baseline of 0 9-1 0 patient was started on heparin GTT with plans for echocardiogram and V/Q scan  Unfortunately patient coded and had Code crimson call for concern of bleeding from lungs  During code patient received 1 unit PRBC and 1 unit FFP and epi x6 and amiodaron 150 x 1  Patient had difficult intubation with narrow airways during code  Bedside echocardiogram reveals mild right heart strain on parasternal long axis view  Repeat EKG shows new right bundle branch block  Upon admission to the intensive care unit patient had bradycardia pulmonary embolism a arrest which required epinephrine x2 and bicarb  Point of care  ABG revealed 6 987/70 3/91 0/16 8 with lactic acid of 13 3  Patient was started on epinephrine GTT, bicarb GTT, fentanyl GTT  Of note patient has significant 8 9 cm right thyroid lobe cystic mass with substernal extension into the mediastinum and associated leftward tracheal deviation identified in CT scan in 4/19/21  On repeated x-ray during this hospitalization and post code it seems that mass is enlarging and there is concern that bleeding may be coming from mass  On repeat chest x-ray post code there is right greater than left infiltrates concerning for pulmonary edema versus active bleeding  After discussion with patient's  patient was transitioned to level 2 code status  Unfortunately at this time patient too unstable for transport for CT study or CTA    Stat echo likely would not  at this point as potential tPA risk outweighs benefit given recent tumor resection, concern for bleeding from cystic mass versus intraparenchymal bleeding  There is also remaining concern for pulmonary embolism  Additionally bronchoscopy at this moment likely would not  or have benefits as due to instability patient likely will not tolerate and no significant therapeutic interventions can be made with bronchoscopy  Patient currently has brainstem reflexes intact and able to breathe on her own  Plan:    start patient on broad-spectrum antibiotics, continue bicarb GTT, epinephrine GTT, defer targeted temperature management at this point, hold heparin GTT, fentanyl for sedation, trend ABGs, trend H&H q 2 hours, lactic acid, BMP, trend PT/INR/PTT, and frequent suctioning of ET tube  If there is significant amount of bloody output from ET tube suctioning and a significant drop in hemoglobin or trend down words of hemoglobin plan for additional FFP and protamine sulfate      Case discussed with Attending Dr Niko Gaspar and critical care advanced practitioner

## 2021-07-24 NOTE — ASSESSMENT & PLAN NOTE
Currently not terribly exacerbated  Patient did have some shortness of breath but is not short of breath at rest   Continue Singulair, add Maikel park  And continue Breo

## 2021-07-24 NOTE — DEATH NOTE
INPATIENT DEATH NOTE  Uma Bravo 67 y o  female MRN: [id number]  Unit/Bed#: ICU 06 Encounter: [id number]    Date, Time and Cause of Death    Date of Death: 21  Time of Death:  5:24 AM  Preliminary Cause of Death: Pulmonary embolism  Entered by: Tio Mcgraw PA-C[LH1 1]     Attribution     LH1  70 Osman Goodwinzahida Smith PA-C 21 06:16           Patient's Information  Pronounced by: Toi Mcgraw PA-C  Did the patient's death occur in the ED?: No  Did the patient's death occur in the OR?: No  Did the patient's death occur less than 10 days post-op?: No  Did the patient's death occur within 24 hours of admission?: Yes  Was code status DNR at the time of death?: Yes    PHYSICAL EXAM:  Unresponsive to noxious stimuli, Spontaneous respirations absent, Breath sounds absent, Carotid pulse absent, Heart sounds absent, Pupillary light reflex absent and Corneal blink reflex absent    Medical Examiner notification criteria:  Patient  within 24 hours of arrival to hospital   Medical Examiner's office notified?:  Yes   Medical Examiner accepted case?:  No  Name of Medical Examiner: Loretta Frey    Family Notification  Was the family notified?: Yes  Date Notified: 21  Time Notified: 530  Notified by: Toi Mcgraw PA-C  Name of Family Notified of Death: Kari Flores   Relationship to Patient: Spouse  Family Notification Route: Telephone  Was the family told to contact a  home?: Yes  Name of  Home[de-identified] [facility]    Autopsy Options:  Post-mortem examination declined by next of kin    Primary Service Attending Physician notified?:  yes - Attending:  Lamar Connell, DO    Physician/Resident responsible for completing Discharge Summary:  Toi Mcgraw PA-C

## 2021-07-24 NOTE — NURSING NOTE
Cardiac monitor displayed patient's heart rate to be in the 160s and a nurse went and check on the patient but the patient denied chest pain, SOB, and any discomfort  The nurse requested for a vital sign to be done  This nurse went into patient's room with the CNA holding the dynamap to perform the vital sign, and assess the patient  Also the patient denied chest pain and SOB, however this nurse went out of the room to get a nasal canula in order to place the patient on oxygen  While approaching the supply room, the previous nurse went back to the patient's room and found patient to be experiencing a change in status  She immediately informed me and I asked her to call a rapid response  I went back to the  patient's room, checked her pulse and was found to have pulse and the rapid response team came into patient's room during this check  While in the patient's room, the patient became unresponsive and code blue was called  CPR was immediately started and the patient was revived after moments of compressions and ventilation  Patient was then taken to ICU

## 2021-07-24 NOTE — ASSESSMENT & PLAN NOTE
Patient with slight elevation in her troponin without evidence for chest pain  But in the context of nausea and vomiting  Will need to trend troponins and decide on further investigation  Will obtain a 2D echo, and follow up on V/Q scan to rule out pulmonary embolism which also could cause an elevated troponin  Further clarification of the diagnosis as a type 2 versus type 1 incident will be obtained prior to discharge  · Trend troponin S  · 2D echo  · Heparin drip in place in anticipation that there may be a pulmonary embolism    Can be discontinuing V/Q scan is completed

## 2021-07-24 NOTE — RESPIRATORY THERAPY NOTE
Responded to Repid response which turned into Code Blue  Patient ventilated Via BVM without artificial airway while CPR in progress  Patient intubated By critical care PA  BL BS post extubation and positive ETCO2 color change  Patient archived ROSC which after I delivered rescue breaths  Patient moved to ICU and had BL BS post transfer to ICU bed  Patient placed on ICU ventilator at this time

## 2021-07-24 NOTE — ASSESSMENT & PLAN NOTE
Patient presents with a combination of nausea vomiting and some shortness of breath  Noted to have elevated BNP and slightly elevated troponin which is trending down on the 2nd value  Concern for pulmonary embolism is present  Patient however cannot have a CT scan of the chest to assess for PE therefore will initiate full-dose heparin while resolving this diagnosis  · Initiate full-dose heparin to cover for possible pulmonary embolism  · V/Q scan in a m   · Oxygen as needed  · DuoNeb as needed, in the context of a history of asthma

## 2021-07-24 NOTE — H&P
Monique 14 1945, 67 y o  female MRN: [id number]  Unit/Bed#: S -07 Encounter: [id number]  Primary Care Provider: Ally Jha MD   Date and time admitted to hospital: 7/23/2021 10:33 AM    SOB (shortness of breath)  Assessment & Plan  Patient presents with a combination of nausea vomiting and some shortness of breath  Noted to have elevated BNP and slightly elevated troponin which is trending down on the 2nd value  Concern for pulmonary embolism is present  Patient however cannot have a CT scan of the chest to assess for PE therefore will initiate full-dose heparin while resolving this diagnosis  · Initiate full-dose heparin to cover for possible pulmonary embolism  · V/Q scan in a m   · Oxygen as needed  · DuoNeb as needed, in the context of a history of asthma  Cystitis  Assessment & Plan  Patient's urinalysis appears to be slightly elevated  In the context of acute kidney injury will go ahead and treat with ceftriaxone and follow up culture  · Urine culture  · Initiate ceftriaxone 1 g Q 24  · Rehydrate and if creatinine still not improve consider ultrasound of the kidney  Elevated troponin level not due myocardial infarction  Assessment & Plan  Patient with slight elevation in her troponin without evidence for chest pain  But in the context of nausea and vomiting  Will need to trend troponins and decide on further investigation  Will obtain a 2D echo, and follow up on V/Q scan to rule out pulmonary embolism which also could cause an elevated troponin  Further clarification of the diagnosis as a type 2 versus type 1 incident will be obtained prior to discharge  · Trend troponin S  · 2D echo  · Heparin drip in place in anticipation that there may be a pulmonary embolism  Can be discontinuing V/Q scan is completed    Nausea with vomiting  Assessment & Plan  Slightly improved  Patient is status post her 1st treatment of chemo    Will continue with antiemetics  Patient can choose from regular diet would suggest light TN toast kind of males this time  Re-evaluate in a m  Colon adenocarcinoma [facility])  Assessment & Plan  Patient was recently diagnosed with adenocarcinoma of the colon after a polyp was biopsied during colonoscopy  Her stage is stage III T4 N1 with 2/35 lymph nodes positive and very close margin to the anterior wall but with negative margins reported  Patient was started on FOLFOX 6 treatment for the next 6 months earlier in the week and presents with worsening nausea and vomiting evidence for dehydration and acute kidney injury  Patient also reported shortness of breath and does appear to have elevated BNP  With a slight bump in troponin  · Plan as above to check 2D echo  · Suspecting elevation of troponins in the context of dehydration and nausea and vomiting    Non-toxic multinodular goiter  Assessment & Plan  Patient noted to have a significant cystic thyroid which is sub clavicular lying in the chest   She has known deviation of the trachea related to this  No intervention at this time, other than to check TSH in a m     Mild persistent asthma without complication  Assessment & Plan  Currently not terribly exacerbated  Patient did have some shortness of breath but is not short of breath at rest   Continue Singulair, add DuoNebs p r n  And continue Breo  Hypokalemia  Assessment & Plan  Mild hypokalemia with a potassium 3 4  Repleted in the ED  Will obtain a m  Levels of potassium and magnesium repleted as needed  Hyperlipidemia  Assessment & Plan  Continue atorvastatin as long as patient is able to take p o  Benign essential hypertension  Assessment & Plan  Patient with a known history of hypertension  Presented with dehydration and low blood pressure in the context of recent chemotherapy with nausea and vomiting    · Hold losartan, hold hydrochlorothiazide  · Continue Norvasc with hold parameters      VTE Prophylaxis: Heparin Drip  / sequential compression device   Code Status:  Full code  POLST: POLST form is not discussed and not completed at this time  Discussion with family:  No family at bedside patient's  is her surrogate decision maker  Was not able to update on admission    Anticipated Length of Stay:  Patient will be admitted on an Inpatient basis with an anticipated length of stay of  greater than 2 midnights  Justification for Hospital Stay:  Needs treatment for possible underlying pulmonary embolism/cardiac condition  Total Time for Visit, including Counseling / Coordination of Care: 70 minutes  Greater than 50% of this total time spent on direct patient counseling and coordination of care  Chief Complaint:   Shortness of breath nausea and vomiting    History of Present Illness:    Angy Lim is a 67 y o  female who presents with a known past medical history for asthma, hypertension, hyperlipidemia, and recent diagnosis of stage III colon cancer status post resection  Patient had a laparoscopic resection of tumor 4 weeks ago  She was able to be staged as stage III T4 N1 adenocarcinoma of the colon and is currently undergoing chemotherapy with FOLFOX 6 under the care of Dr Francisco Castañeda  Patient had her 1st treatment on Wednesday  By Thursday she did not feel well started to have nausea vomiting  She came to the emergency room today with persistent nausea and vomiting and not feeling well  She was found to have an acute kidney injury, possible evidence for cystitis, slight elevation in her troponin of unclear significance she did not report any chest pain  She does complain of some shortness of breath but her chest x-ray was negative  She does have a history of asthma and does not appear to be excessively exacerbated but this could be an early exacerbation  Patient has a history of COVID vaccination    Review of Systems:    Review of Systems   Constitutional: Positive for fatigue   Negative for appetite change, chills, diaphoresis, fever and unexpected weight change  HENT: Negative for dental problem, ear discharge, ear pain, facial swelling, hearing loss, mouth sores, nosebleeds and rhinorrhea  Eyes: Negative for pain, discharge, redness and visual disturbance  Respiratory: Positive for shortness of breath  Negative for cough, chest tightness and wheezing  Cardiovascular: Negative for chest pain, palpitations and leg swelling  Gastrointestinal: Positive for nausea and vomiting  Negative for abdominal distention, abdominal pain, blood in stool, constipation and diarrhea  Endocrine: Negative for cold intolerance, heat intolerance, polydipsia, polyphagia and polyuria  Genitourinary: Negative for dysuria, frequency, hematuria and urgency  Musculoskeletal: Negative for arthralgias and back pain  Skin: Negative for rash and wound  Neurological: Negative for dizziness, weakness, light-headedness, numbness and headaches  Hematological: Negative for adenopathy  Does not bruise/bleed easily  Psychiatric/Behavioral: Negative for confusion and dysphoric mood  All other systems reviewed and are negative          Past Medical and Surgical History:     Past Medical History:   Diagnosis Date    Allergic rhinitis     last assessed: 12/12/2012    Asthma     Colon cancer ([facility] [address] )     Dysuria 5/14/2020    Hyperlipidemia     Hypertension     Hypokalemia     last assessed: 12/14/2012    Urine frequency 5/25/2016       Past Surgical History:   Procedure Laterality Date    COLONOSCOPY  2009    COLPOSCOPY      DIAGNOSTIC LAPAROSCOPY  1987    IR PORT PLACEMENT  7/14/2021    W[place] LAP,SURG,COLECTOMY, PARTIAL, W/ANAST N/A 6/3/2021    Procedure: RESECTION COLON LEFT LAPAROSCOPIC;  Surgeon: Adrien Ferrer MD;  Location: [facility];  Service: Colorectal    WY SIGMOIDOSCOPY FLX DX W/COLLJ SPEC BR/WA IF PFRMD N/A 6/3/2021    Procedure: Tash Crook;  Surgeon: Adrien Ferrer MD; Location: [facility];  Service: Colorectal    TUBAL LIGATION         Meds/Allergies:    Prior to Admission medications    Medication Sig Start Date End Date Taking?  Authorizing Provider   amLODIPine (NORVASC) 2 5 mg tablet TAKE 1 TABLET BY MOUTH EVERY DAY 6/6/21  Yes Zach Alvarado MD   amLODIPine (NORVASC) 5 mg tablet TAKE 1 TABLET DAILY ALONG WITH A 2 5 MG TABLET 7/14/21  Yes Zach Alvarado MD   atorvastatin (LIPITOR) 40 mg tablet TAKE 1 TABLET BY MOUTH EVERY DAY  Patient taking differently: every evening  12/16/20  Yes Zach Alvarado MD   Calcium Carbonate-Vitamin D (CALCIUM 600+D) 600-200 MG-UNIT TABS Take 1 tablet by mouth daily   Yes Historical Provider, MD   fluticasone (FLONASE) 50 mcg/act nasal spray SPRAY 1 SPRAY INTO EACH NOSTRIL EVERY DAY 5/23/21  Yes Zach Alvarado MD   hydrochlorothiazide (HYDRODIURIL) 25 mg tablet TAKE 1 TABLET BY MOUTH EVERY DAY 7/23/21  Yes Zach Alvarado MD   Klor-Con M20 20 MEQ tablet TAKE 1 TABLET (20 MEQ TOTAL) BY MOUTH 2 (TWO) TIMES A DAY 6/13/21  Yes Zach Alvarado MD   loratadine (CLARITIN) 10 mg tablet Take 1 tablet by mouth daily as needed   Yes Historical Provider, MD   losartan (COZAAR) 100 MG tablet TAKE 1 TABLET BY MOUTH EVERY DAY 7/23/21  Yes Zach Alvarado MD   montelukast (SINGULAIR) 10 mg tablet TAKE 1 TABLET AT BEDTIME  7/23/21  Yes Zach Alvarado MD   Omega-3 Fatty Acids (Fish Oil) 1200 MG CAPS Take by mouth daily   Yes Historical Provider, MD   prochlorperazine (COMPAZINE) 10 mg tablet Take 1 tablet (10 mg total) by mouth every 6 (six) hours as needed for nausea or vomiting 6/25/21  Yes MD Martín Isbell Inhub 100-50 MCG/DOSE inhaler INHALE 1 PUFF BY MOUTH EVERY DAY 3/18/21  Yes Zach Alvarado MD   fluorouracil 4,030 mg in CADD infusion pump Infuse 4,030 mg (1,200 mg/m2/day x 1 68 m2 (Treatment Plan Recorded BSA)) into a venous catheter over 46 hours for 2 days 7/21/21 7/23/21  Homa Nguyen MD   hydrochlorothiazide (HYDRODIURIL) 25 mg tablet TAKE 1 TABLET BY MOUTH EVERY DAY 1/30/21 7/23/21  Rhys Danielle Ralph Chinchilla MD   losartan (COZAAR) 100 MG tablet TAKE 1 TABLET BY MOUTH EVERY DAY 21  Zach Alvarado MD   montelukast (SINGULAIR) 10 mg tablet TAKE 1 TABLET AT BEDTIME  20  Zach Alvarado MD     I have reviewed home medications with patient personally  Allergies:    Allergies   Allergen Reactions    Sulfa Antibiotics Other (See Comments) and Hives     Black spots under skin    Amoxicillin GI Intolerance     Only if she takes it on an empty stomach, she tolerates it if she takes it with food       Social History:     Marital Status: /Civil Union  [place]    Patient Pre-hospital Living Situation:  Assisted  Patient Pre-hospital Level of Mobility:  No restrictions  Patient Pre-hospital Diet Restrictions:  No restrictions  Substance Use History:   Social History     Substance and Sexual Activity   Alcohol Use Yes    Alcohol/week: 2 0 standard drinks    Types: 2 Glasses of wine per week    Comment: socially      Social History     Tobacco Use   Smoking Status Former Smoker    Quit date:     Years since quittin 5   Smokeless Tobacco Never Used     Social History     Substance and Sexual Activity   Drug Use No       Family History:    Family History   Problem Relation Age of Onset    Lung cancer Mother 80    Asthma Family     Coronary artery disease Family     Thyroid disease Family     No Known Problems Father     No Known Problems Daughter     No Known Problems Maternal Grandmother     Cancer Maternal Grandfather         unknown type/age    No Known Problems Paternal Grandmother     No Known Problems Paternal Grandfather     No Known Problems Son     No Known Problems Maternal Aunt     No Known Problems Paternal Aunt        Physical Exam:     Vitals:   Blood Pressure: 110/57 (21)  Pulse: 87 (21)  Temperature: 98 5 °F (36 9 °C) (21 1034)  Temp Source: Oral (21 1034)  Respirations: 18 (21)  Height: 5' 4" (162 6 cm) (21 )  Weight - Scale: 72 2 kg (159 lb 2 8 oz) (21)  SpO2: 91 % (21)    Physical Exam    Generally well-developed recently nourished female no acute distress normocephalic atraumatic pupils equal round reactive to light extraocular muscles intact mucous membranes are moist neck is supple there is no JVD no lymph nodes carotid bruit  Chest is decreased with poor air entry there is no markers of wheezes  Abdomen is soft minimally tender, evidence for recent laparoscopic procedure done extremities no clubbing cyanosis edema new line neurologically patient awake alert oriented cranial nerves 2-12 intact    Additional Data:     Lab Results: I have personally reviewed pertinent reports  Results from last 7 days   Lab Units 21  1122   WBC Thousand/uL 7 66   HEMOGLOBIN g/dL 12 7   HEMATOCRIT % 39 7   PLATELETS Thousands/uL 215   NEUTROS PCT % 76*   LYMPHS PCT % 17   MONOS PCT % 7   EOS PCT % 0     Results from last 7 days   Lab Units 21  1122   SODIUM mmol/L 135*   POTASSIUM mmol/L 3 4*   CHLORIDE mmol/L 98*   CO2 mmol/L 20*   BUN mg/dL 30*   CREATININE mg/dL 1 62*   ANION GAP mmol/L 17*   CALCIUM mg/dL 9 3   ALBUMIN g/dL 3 5   TOTAL BILIRUBIN mg/dL 0 60   ALK PHOS U/L 60   ALT U/L 38   AST U/L 30   GLUCOSE RANDOM mg/dL 136     Results from last 7 days   Lab Units 21  1122   INR  0 96                   Imaging: I have personally reviewed pertinent reports  XR chest 1 view portable   Final Result by Jaci Basurto MD ( 115)      No acute cardiopulmonary disease  Redemonstration of deviation of the trachea to the left by the large mediastinal mass           Workstation performed: [id number]         NM inpatient order    (Results Pending)       EKG, Pathology, and Other Studies Reviewed on Admission:   · EK beats per minute normal sinus rhythm no acute STT wave changes    Allscripts / Epic Records Reviewed: Yes     ** Please Note: This note has been constructed using a voice recognition system   **

## 2021-07-24 NOTE — PROGRESS NOTES
Vancomycin Assessment    Noris Jose is a 67 y o  female who is currently receiving vancomycin vancomycin 1000mg q24h for Pneumonia     Relevant clinical data and objective history reviewed:  Creatinine   Date Value Ref Range Status   07/24/2021 2 02 (H) 0 60 - 1 30 mg/dL Final     Comment:     Standardized to IDMS reference method   07/24/2021 1 64 (H) 0 60 - 1 30 mg/dL Final     Comment:     Standardized to IDMS reference method   07/23/2021 1 62 (H) 0 60 - 1 30 mg/dL Final     Comment:     Standardized to IDMS reference method   12/18/2015 1 05 0 60 - 1 30 mg/dL Final     Comment:     Standardized to IDMS reference method   01/21/2015 1 15 0 60 - 1 30 mg/dL Final     Comment:     Standardized to IDMS reference method   06/13/2014 1 06 0 60 - 1 30 mg/dL Final     Comment:     Standardized to IDMS reference method     /68 (BP Location: Right arm)   Pulse 97   Temp (!) 96 8 °F (36 °C) (Axillary)   Resp 18   Ht 5' 4" (1 626 m)   Wt 72 2 kg (159 lb 2 8 oz)   LMP  (LMP Unknown)   SpO2 (!) 70%   BMI 27 32 kg/m²   No intake/output data recorded  Lab Results   Component Value Date/Time    BUN 31 (H) 07/24/2021 02:40 AM    BUN 15 12/18/2015 08:15 AM    WBC 22 93 (H) 07/24/2021 02:40 AM    WBC 5 34 12/18/2015 08:15 AM    HGB 11 2 (L) 07/24/2021 03:51 AM    HGB 11 2 (L) 07/24/2021 03:44 AM    HGB 13 3 12/18/2015 08:15 AM    HCT 33 (L) 07/24/2021 03:51 AM    HCT 37 2 07/24/2021 02:40 AM    HCT 41 3 12/18/2015 08:15 AM    MCV 84 07/24/2021 02:40 AM    MCV 82 12/18/2015 08:15 AM     07/24/2021 02:40 AM     12/18/2015 08:15 AM     Temp Readings from Last 3 Encounters:   07/24/21 (!) 96 8 °F (36 °C) (Axillary)   07/21/21 98 3 °F (36 8 °C) (Temporal)   07/14/21 97 5 °F (36 4 °C)     Vancomycin Days of Therapy: 1    Assessment/Plan  The patient is currently on vancomycin utilizing pulse dosing based on actual body weight    Baseline risks associated with therapy include: pre-existing renal impairment, advanced age, and dehydration  The patient is currently receiving vancomycin 1000mg q24h and after clinical evaluation will be changed to vancomycin 1500mg x1, then 1000mg daily prn for random trough <20  Pharmacy will also follow closely for s/sx of nephrotoxicity, infusion reactions, and appropriateness of therapy  BMP and CBC will be ordered per protocol  Plan for random trough at approximately 0630 7/25  Due to infection severity, will target a trough of 15-20 (appropriate for most indications)   Pharmacy will continue to follow the patients culture results and clinical progress daily      Siva Reyes, Pharmacist no

## 2021-07-24 NOTE — ASSESSMENT & PLAN NOTE
Slightly improved  Patient is status post her 1st treatment of chemo  Will continue with antiemetics  Patient can choose from regular diet would suggest light TN toast kind of males this time  Re-evaluate in a m

## 2021-07-24 NOTE — ASSESSMENT & PLAN NOTE
Patient noted to have a significant cystic thyroid which is sub clavicular lying in the chest   She has known deviation of the trachea related to this  No intervention at this time, other than to check TSH in a m

## 2021-07-24 NOTE — RAPID RESPONSE
Rapid Response Note  Josi Chapa 67 y o  female MRN: [id number]  Unit/Bed#: ICU 06 Encounter: [id number]    Rapid Response Notification(s):   Response called date/time:  7/24/2021 12:46 AM  Response team arrival date/time:  7/24/2021 12:49 AM  Response end date/time:  7/24/2021 2:00 AM  Rapid response location:  [facility]  Primary reason for rapid response call:  Acute change in neuro status    Rapid Response Intervention(s):   Airway:  Endotracheal intubation  Breathing:  Oxygen and assisted ventilation  Circulation:  ACLS protocol, defibrillation and chest compressions  Fluids administered:  Isolyte/plasmalyte and blood  Medications administered:  Sodium bicarbonate       Background/Situation:   Josi Chapa is a 67 y o  female for whom a rapid response was called that quickly turned into a code blue  Please see further documentation  Review of Systems   Unable to perform ROS: Acuity of condition       Objective:   Vitals:    07/24/21 0135 07/24/21 0200 07/24/21 0210 07/24/21 0358   BP:       BP Location:       Pulse:       Resp:       Temp:  (!) 96 1 °F (35 6 °C) (!) 96 8 °F (36 °C)    TempSrc:  Axillary Axillary    SpO2: 93%   (!) 70%   Weight:       Height:         Physical Exam  Constitutional:       General: She is in acute distress  Appearance: She is ill-appearing  Cardiovascular:      Comments: No palpable pulse  Pulmonary:      Comments: apnea  Neurological:      Mental Status: She is unresponsive  · Assessment/Plan:    Pt found to be in cardiac arrest - ACLS protocol initiated - please see further code blue and critical care documentation  Rapid Response Outcome:   Condition:   In critical condition  Progression:  Worsening  Transfer:  Transfer to ICU  Primary service notified of transfer: Yes    Handoff report: by phone    Code Status: Level 1 (Full Code)      Family notified of transfer: Yes  Family member contacted: Pt's , Abby Gerber, was called and came to the bedside Portions of the record may have been created with voice recognition software  Occasional wrong word or "sound a like" substitutions may have occurred due to the inherent limitations of voice recognition software  Read the chart carefully and recognize, using context, where substitutions have occurred      Alexx Coughlin PA-C

## 2021-07-24 NOTE — ASSESSMENT & PLAN NOTE
Mild hypokalemia with a potassium 3 4  Repleted in the ED  Will obtain a m  Levels of potassium and magnesium repleted as needed

## 2021-07-24 NOTE — ACP (ADVANCE CARE PLANNING)
Had extensive conversation with pt's  Dennie Sero regarding Symone's cardiac arrest, CPR, events following ROSC, probable diagnosis of PE and what that means for her heart  He decided that he would like us to move forward with maximum current therapy but without any escalation of care  He would like her to be a level 2 DNR  He udnerstands that she is critically ill and may not make it through to the morning  Orders placed  All questions answered

## 2021-07-24 NOTE — ASSESSMENT & PLAN NOTE
Patient's urinalysis appears to be slightly elevated  In the context of acute kidney injury will go ahead and treat with ceftriaxone and follow up culture  · Urine culture  · Initiate ceftriaxone 1 g Q 24  · Rehydrate and if creatinine still not improve consider ultrasound of the kidney

## 2021-07-24 NOTE — UTILIZATION REVIEW
Initial Clinical Review    Admission: Date/Time/Statement:   Admission Orders (From admission, onward)     Ordered        07/23/21 1232  INPATIENT ADMISSION  Once                   Orders Placed This Encounter   Procedures    INPATIENT ADMISSION     Standing Status:   Standing     Number of Occurrences:   1     Order Specific Question:   Level of Care     Answer:   Med Surg [16]     Order Specific Question:   Estimated length of stay     Answer:   More than 2 Midnights     Order Specific Question:   Certification     Answer:   I certify that inpatient services are medically necessary for this patient for a duration of greater than two midnights  See H&P and MD Progress Notes for additional information about the patient's course of treatment  ED Arrival Information     Expected Arrival Acuity    - 7/23/2021 10:28 Urgent         Means of arrival Escorted by Service Admission type    Wheelchair Self Pulmonology Urgent         Arrival complaint    SOB        Chief Complaint   Patient presents with    Shortness of Breath     pt c/o SOB and nausea s/p first chemo tx on Wed       Initial Presentation: 66 yo fem who got the covid vaccine w/hx asthma, non toxic multinodular goiter with known tracheal deviation, htn, stage III colon ca s/p resection and chemo start this week to ED from home admitted as inpatient due to dyspnea with possible PE, cystitis, suspected dehydration  Presented several days after 1st chemo treatment with persistent n/v that began the day after chemo started  Exam revealed decreased air entry  Workup revealed TOMER, cystitis, hypokalemia, and elevated troponins  cxr showed L tracheal deviation from large mediastinal mass  EKG nsr  Check VQ, start heparin gtt, IV antbx for cystitis, antiemetics for nausea, IVF for TOMER  Date: 7/24   Day 2: coded around 0230, initially had 's, denied pain or sob; shortly thereafter, coded   CPR immediately started and she was revived after moments of compressions and ventilation  She had copious amounts blood coming from mouth and nose requiring 1u prbc's and 1u ffp  6 doses IV epi, 1 dose IV amio given  Intubation was difficult due to narrow airway, but was tubed and vented,  Bedside echo showed R heart strain  EKG showed new RBBB  Bedside pads were placed showing v fib, shocked x 1 w/ROSC  Was transferred to the ICU and went into woo arrest requiring epi and bicarb  Acidotic with epi / bicarb / fentanyl gtt's started  Her goiter mass is enlarging and could be bleeding  She was too unstable to take to CT for CTA study  Still concern for PE  Continue broad spectrum antbx, gtts, serial labs, suction ETT   had changed her to DNR after it was determined likely she would likely not survive through the morning   @0524, patient  after she further deteriorated       ED Triage Vitals   Temperature Pulse Respirations Blood Pressure SpO2   21 1034 21 1034 21 1034 21 1034 21 1034   98 5 °F (36 9 °C) 86 18 119/57 97 %      Temp Source Heart Rate Source Patient Position - Orthostatic VS BP Location FiO2 (%)   21 1034 21 1034 21 1254 21 1254 --   Oral Monitor Lying Right arm       Pain Score       21       No Pain          Wt Readings from Last 1 Encounters:   21 72 2 kg (159 lb 2 8 oz)     Additional Vital Signs:   Date/Time  Temp  Pulse  Resp  BP  MAP (mmHg)  SpO2  O2 Device  Patient Position - Orthostatic VS   21 0524  97 9 °F (36 6 °C)  0Abnormal   9Abnormal   --  --  --  --  Lying   21 0445  97 9 °F (36 6 °C)  96  26Abnormal   165/106Abnormal   118  --  --  Lying   21 0430  97 7 °F (36 5 °C)  96  39Abnormal   109/66  77  --  --  Lying   21 0415  --  99  31Abnormal   95/50  68  --  --  Lying   21 0400  --  96  52Abnormal   85/48Abnormal   58  --  --  Lying   21 0358  --  --  --  --  --  70 %Abnormal   --  --   21 2030  --  95 47Abnormal   80/53Abnormal   62  --  --  Lying   07/24/21 0300  --  95  28Abnormal   79/53Abnormal   62  --  --  Lying   07/24/21 0245  --  88  34Abnormal   68/38Abnormal   49  58 %Abnormal   --  Lying   07/24/21 0230  --  90  53Abnormal   101/56  61  70 %Abnormal   --  Lying   07/24/21 0210  96 8 °F (36 °C)Abnormal   94  25Abnormal   100/52  73  77 %Abnormal   --  Lying   07/24/21 0200  96 1 °F (35 6 °C)Abnormal   --  --  --  --  --  --  --   07/24/21 0135  --  --  --  --  --  93 %  --  --   07/23/21 2226  98 8 °F (37 1 °C)  97  18  120/68  --  93 %  None (Room air)  Lying   07/23/21 2110  --  87  18  110/57  --  91 %  None (Room air)  Sitting   07/23/21 2013  --  95  18  132/66  --  95 %  None (Room air)  Sitting   07/23/21 1622  --  96  --  125/67  --  95 %  None (Room air)  Lying   07/23/21 1300  --  78  18  110/56  77  94 %  --  --   07/23/21 1254  --  82  18  110/56  --  95 %  None (Room air)  Lying       Pertinent Labs/Diagnostic Test Results:        XR chest portable ICU   Final Result by North Montemayor MD (07/24 2855)      ET tube 6 cm above the christina  Deviation of the trachea to the left by the large mediastinal mass  Extensive new right lung consolidation, question aspiration  Workstation performed: [id number]         XR chest 1 view portable   Final Result by North Montemayor MD (07/23 5419)      No acute cardiopulmonary disease  Redemonstration of deviation of the trachea to the left by the large mediastinal mass           Workstation performed: [id number]           7/23 EKG nsr      Results from last 7 days   Lab Units 07/24/21  0351 07/24/21  0344 07/24/21  0240 07/24/21  0125 07/23/21  1122 07/19/21  0926   WBC Thousand/uL  --   --  22 93*  --  7 66 6 88   HEMOGLOBIN g/dL  --  11 2* 11 4*  --  12 7 12 9   I STAT HEMOGLOBIN g/dl 11 2* 10 9*  --  12 9  --   --    HEMATOCRIT %  --   --  37 2  --  39 7 41 5   HEMATOCRIT, ISTAT % 33* 32*  --  38  --   --    PLATELETS Thousands/uL --   --  214  --  215 234   NEUTROS ABS Thousands/µL  --   --   --   --  5 82 3 74   BANDS PCT %  --   --  31*  --   --   --          Results from last 7 days   Lab Units 07/24/21  0351 07/24/21 0344 07/24/21 0240 07/24/21 0125 07/24/21  0112 07/23/21  1122 07/19/21  0926   SODIUM mmol/L  --   --  144  --  136 135* 137   POTASSIUM mmol/L  --   --  3 7  --  4 6 3 4* 3 7   CHLORIDE mmol/L  --   --  103  --  102 98* 104   CO2 mmol/L  --   --  23  --  23 20* 27   CO2, I-STAT mmol/L 23 22  --  19*  --   --   --    ANION GAP mmol/L  --   --  18*  --  11 17* 6   BUN mg/dL  --   --  31*  --  31* 30* 12   CREATININE mg/dL  --   --  2 02*  --  1 64* 1 62* 1 02   EGFR ml/min/1 73sq m  --   --  23  --  30 30 53   CALCIUM mg/dL  --   --  8 4  --  15 7* 9 3 9 9   CALCIUM, IONIZED mmol/L  --   --  1 09*  --   --   --   --    MAGNESIUM mg/dL  --   --  2 3  --   --   --   --    PHOSPHORUS mg/dL  --   --  9 6*  --   --   --   --      Results from last 7 days   Lab Units 07/24/21 0240 07/23/21  1122 07/19/21  0926   AST U/L 114* 30 18   ALT U/L 98* 38 30   ALK PHOS U/L 103 60 82   TOTAL PROTEIN g/dL 4 4* 6 9 7 2   ALBUMIN g/dL 1 9* 3 5 3 5   TOTAL BILIRUBIN mg/dL 0 21 0 60 0 42   BILIRUBIN DIRECT mg/dL 0 08  --   --          Results from last 7 days   Lab Units 07/24/21  0240 07/24/21 0112 07/23/21  1122 07/19/21  0926   GLUCOSE RANDOM mg/dL 324* 209* 136 95       Results from last 7 days   Lab Units 07/24/21 0351 07/24/21 0344 07/24/21  0125   I STAT BASE EXC mmol/L -8* -8* -15*   I STAT O2 SAT % 66 76 90*   ISTAT PH ART  7 133* 7 163* 6 987*   I STAT ART PCO2 mm HG 63 7* 57 4* 70 3*   I STAT ART PO2 mm HG 46 0* 52 0* 91 0   I STAT ART HCO3 mmol/L 21 3* 20 6* 16 8*         Results from last 7 days   Lab Units 07/24/21  0240 07/23/21  1437 07/23/21  1122   TROPONIN I ng/mL 0 85* 0 49* 0 52*     Results from last 7 days   Lab Units 07/23/21  1122   D-DIMER QUANTITATIVE ug/ml FEU 2 75*     Results from last 7 days   Lab Units 07/24/21  0240 07/23/21  1921 07/23/21  1122   PROTIME seconds 18 1*  --  12 9   INR  1 48*  --  0 96   PTT seconds 71* 205* 26     Results from last 7 days   Lab Units 07/24/21  0240 07/24/21  0112   TSH 3RD GENERATON uIU/mL 2 133 1 322     Results from last 7 days   Lab Units 07/24/21  0240   PROCALCITONIN ng/ml 0 15     Results from last 7 days   Lab Units 07/24/21  0240 07/24/21  0112   LACTIC ACID mmol/L 12 4* 13 3*             Results from last 7 days   Lab Units 07/23/21  1122   NT-PRO BNP pg/mL 6,660*     Results from last 7 days   Lab Units 07/23/21 2011 07/23/21  1441   CLARITY UA  Clear Clear   COLOR UA  Yellow Yellow   SPEC GRAV UA  1 025 >=1 030   PH UA  6 0 6 0   GLUCOSE UA mg/dl Negative Negative   KETONES UA mg/dl Trace* Trace*   BLOOD UA  Negative Trace*   PROTEIN UA mg/dl Trace* 30 (1+)*   NITRITE UA  Negative Negative   BILIRUBIN UA  Negative Negative   UROBILINOGEN UA E U /dl 0 2 0 2   LEUKOCYTES UA  Small* Trace*   WBC UA /hpf 4-10* 10-20*   RBC UA /hpf 0-1* 0-1   BACTERIA UA /hpf Occasional Occasional   EPITHELIAL CELLS WET PREP /hpf Occasional Occasional   MUCUS THREADS  Occasional*  --      Results from last 7 days   Lab Units 07/24/21  0240   TOTAL COUNTED  100           ED Treatment:   Medication Administration from 07/23/2021 1027 to 07/23/2021 2214       Date/Time Order Dose Route Action     07/23/2021 1124 ondansetron (ZOFRAN) injection 4 mg 4 mg Intravenous Given     07/23/2021 1123 sodium chloride 0 9 % bolus 500 mL 500 mL Intravenous New Bag     07/23/2021 1250 heparin (porcine) injection 4,800 Units 4,800 Units Intravenous Given     07/23/2021 2109 heparin (porcine) 25,000 units in 0 45% NaCl 250 mL infusion (premix) 15 Units/kg/hr Intravenous Restarted     07/23/2021 1251 heparin (porcine) 25,000 units in 0 45% NaCl 250 mL infusion (premix) 18 Units/kg/hr Intravenous New Bag     07/23/2021 2010 atorvastatin (LIPITOR) tablet 40 mg 40 mg Oral Given     07/23/2021 2122 montelukast (SINGULAIR) tablet 10 mg 10 mg Oral Given     07/23/2021 1724 multi-electrolyte (PLASMALYTE-A/ISOLYTE-S PH 7 4) IV solution 100 mL/hr Intravenous New Bag        Past Medical History:   Diagnosis Date    Allergic rhinitis     last assessed: 12/12/2012    Asthma     Colon cancer ([facility] )     Dysuria 5/14/2020    Hyperlipidemia     Hypertension     Hypokalemia     last assessed: 12/14/2012    Urine frequency 5/25/2016     Present on Admission:   Benign essential hypertension   Hyperlipidemia   Mild persistent asthma without complication   Colon adenocarcinoma (HCC)   Non-toxic multinodular goiter   Pulmonary emboli (HCC)      Admitting Diagnosis: SOB (shortness of breath) [R06 02]  Dyspnea on exertion [R06 00]  Elevated troponin [R77 8]  TOMER (acute kidney injury) ([facility] ) [N17 9]  Elevated d-dimer [R79 89]  Age/Sex: 67 y o  female  Admission Orders:  Scheduled Medications:  IV albumin [email or web address]  IV ca clug [email or web address]  IV rocephin q24h  1li IV isolyte [email or web address]      Continuous IV Infusions:amiodarone (CORDARONE) 900 mg in dextrose 5 % 500 mL infusion   Rate: 33 3 mL/hr Dose: 1 mg/min  Freq: Continuous Route: IV  Last Dose: Stopped (07/24/21 0524)  Start: 07/24/21 0330 End: 07/24/21 1140  EPINEPHrine 3000 mcg (STANDARD CONCENTRATION) IV in sodium chloride 0 9% 250 mL   Rate: 5-50 mL/hr Dose: 1-10 mcg/min  Freq: Titrated Route: IV  Last Dose: Stopped (07/24/21 0524)  Start: 07/24/21 0145 End: 07/24/21 1140  fentaNYL 1000 mcg in sodium chloride 0 9% 100mL infusion   Rate: 10 mL/hr Dose: 100 mcg/hr  Freq: Continuous Route: IV  Last Dose: Stopped (07/24/21 0524)  Start: 07/24/21 0230 End: 07/24/21 1140  heparin (porcine) 25,000 units in 0 45% NaCl 250 mL infusion (premix)   Rate: 1 8-18 mL/hr Dose: 3-30 Units/kg/hr  Weight Dosing Info: 60 kg (Order-Specific)  Freq: Titrated Route: IV  Last Dose: 15 Units/kg/hr (07/23/21 7166)  Start: 07/23/21 1245 End: 07/24/21 0235  multi-electrolyte (PLASMALYTE-A/ISOLYTE-S PH 7 4) IV solution   Rate: 100 mL/hr Dose: 100 mL/hr  Freq: Continuous Route: IV  Indications of Use: IV Hydration  Last Dose: 100 mL/hr (07/23/21 1724)  Start: 07/23/21 1615 End: 07/24/21 0243  NOREPINEPHRINE 4 MG  ML NSS (CMPD ORDER) infusion   Rate: 3 8-112 5 mL/hr Dose: 1-30 mcg/min  Freq: Titrated Route: IV  Last Dose: Stopped (07/24/21 0524)  Start: 07/24/21 0300 End: 07/24/21 1140    PRN Meds:  Code meds:   IV epi x 9  IV amio x 1  IV ca cl x 1  IV na bicarb x 5      Network Utilization Review Department  ATTENTION: Please call with any questions or concerns to [phone] and carefully listen to the prompts so that you are directed to the right person  All voicemails are confidential   Kizzy Forde all requests for admission clinical reviews, approved or denied determinations and any other requests to dedicated fax number below belonging to the campus where the patient is receiving treatment   List of dedicated fax numbers for the Facilities:  [address] DENIALS (Administrative/Medical Necessity) [phone]   [address] (Maternity/NICU/Pediatrics) [phone] [address] [address] [place] [address] [address]us Wilkinson [address] [facility] [address] [phone]

## 2021-07-24 NOTE — DISCHARGE SUMMARY
Discharge Summary - Angy Lim 67 y o  female MRN: [id number]    Unit/Bed#: ICU 06 Encounter: [id number] PCP: Jose Savage MD    Admission Date:   Admission Orders (From admission, onward)     Ordered        07/23/21 1232  INPATIENT ADMISSION  Once                     Admitting Diagnosis: SOB (shortness of breath) [R06 02]  Dyspnea on exertion [R06 00]  Elevated troponin [R77 8]  TOMER (acute kidney injury) ([address] ) [N17 9]  Elevated d-dimer [R79 89]    HPI: as per Carter Valencia MD  Angy Lim is a 67 y o  female who presents with a known past medical history for asthma, hypertension, hyperlipidemia, and recent diagnosis of stage III colon cancer status post resection  Patient had a laparoscopic resection of tumor 4 weeks ago  She was able to be staged as stage III T4 N1 adenocarcinoma of the colon and is currently undergoing chemotherapy with FOLFOX 6 under the care of Dr Francisco Castañeda  Patient had her 1st treatment on Wednesday  By Thursday she did not feel well started to have nausea vomiting  She came to the emergency room today with persistent nausea and vomiting and not feeling well  She was found to have an acute kidney injury, possible evidence for cystitis, slight elevation in her troponin of unclear significance she did not report any chest pain  She does complain of some shortness of breath but her chest x-ray was negative  She does have a history of asthma and does not appear to be excessively exacerbated but this could be an early exacerbation  Patient has a history of COVID vaccination    Procedures Performed:   Orders Placed This Encounter   Procedures    ED ECG Documentation Only    Intubation       Summary of Hospital Course: Angy Lim is a 67year old female patient with past medical history as above, who originally presented on 7/23 for evaluation of nausea and vomiting after initiation of chemotherapy and SOB and was admitted to [place] [address] for further magement   Troponin, NTproBNP and D-dimer were elevated on presentation and there was concern for PE  CTA chest was deferred because of TOMER and patient was empirically treated with heparin drip pending VQ scan and duplex of the lower extremities  At 12:46 AM on  a rapid response was called after patient was found unresponsive by nursing staff which was escalated to a code blue after SLIM provider found patient pulseless  Patient developed bleeding from mouth and nose and code crimson was also initiated since patient was on heparin drip, but could be aborted after stat labs showed stable hemoglobin level  Patient was found to be in vfib arrest and ROSC was achieved after 1x shock  Patient was intubated and mechanically ventilated and transferred to ICU where she became bradycardic and lost pulse  ROSC was again achieved after two more rounds of ACLS protocol  Patient's  arrived and was updated regarding diagnosis, current course and prognosis at which time he decided to change code status to level 2 DNR  Patient further deteriorated resulting in cardiac arrest  Patient was pronounced  at 5:24AM on   Significant Findings, Care, Treatment and Services Provided:   : Chest x-ray: no acute cardiopulmonary disease  : intubation and ventilation  : ET tube 6 cm above the christina  Deviation of the trachea to the left by the large mediastinal mass  Extensive new right lung consolidation, question aspiration  Complications: None    Disposition:      Final Diagnosis: Cardiopulmonary arrest    Medical Problems     Resolved Problems  Date Reviewed: 2021    None                Condition at Time of Death: critical    Date, Time and Cause of Death    Date of Death: 21  Time of Death:  5:24 AM  Preliminary Cause of Death: Pulmonary embolism  Entered by: Natacha Melissa PA-C[LH1 1]     Attribution     LH1  70 Osman Waller PA-C 21 06:16          Death Note:    [address] [address] [facility] Eh Hudson 67 y o  female MRN: [id number]  Unit/Bed#: ICU 06 Encounter: [id number]    Date, Time and Cause of Death    Date of Death: 21  Time of Death:  5:24 AM  Preliminary Cause of Death: Pulmonary embolism  Entered by: Salome Rivera PA-C[LH1 1]     Attribution     LH1  70 Osman Hapeewee Newsome PA-C 21 06:16           Patient's Information  Pronounced by: Salome Rivera PA-C  Did the patient's death occur in the ED?: No  Did the patient's death occur in the OR?: No  Did the patient's death occur less than 10 days post-op?: No  Did the patient's death occur within 24 hours of admission?: Yes  Was code status DNR at the time of death?: Yes    PHYSICAL EXAM:  Unresponsive to noxious stimuli, Spontaneous respirations absent, Breath sounds absent, Carotid pulse absent, Heart sounds absent, Pupillary light reflex absent and Corneal blink reflex absent    Medical Examiner notification criteria:  Patient  within 24 hours of arrival to hospital   Medical Examiner's office notified?:  Yes   Medical Examiner accepted case?:  No  Name of Medical Examiner: Jasmin Conti    Family Notification  Was the family notified?: Yes  Date Notified: 21  Time Notified: 530  Notified by: Salome Rivera PA-C  Name of Family Notified of Death: Carlton Singh   Relationship to Patient: Spouse  Family Notification Route: Telephone  Was the family told to contact a  home?: Yes  Name of  Home[de-identified] [facility]    Autopsy Options:  Post-mortem examination declined by next of kin    Primary Service Attending Physician notified?:  yes - Attending:  Gris Bergman DO    Physician/Resident responsible for completing Discharge Summary:  Salome Rivera PA-C

## 2021-07-24 NOTE — NURSING NOTE
Spoke to [facility], MICHELLE Zhu, who has released the patient  [facility] spoke to patient's  who has indicated that [place] is their  home of choice with whom they have made arrangements  [facility] was also contacted, spoke to Shona Arthur who indicated that due to age, patient is not suitable for tissue donation, she has been released from consideration as well

## 2021-07-24 NOTE — H&P
H&P Exam - [facility] 67 y o  female MRN: [id number]  Unit/Bed#: ICU 06 Encounter: [id number]      -------------------------------------------------------------------------------------------------------------  Chief Complaint: Cardiac Arrest     History of Present Illness   HX and PE limited by: cardiac arrest  Milton Vargas is a 67 y o  female with a PMH significant asthma, HTN, HLD, and recent diagnosis of stage III colon adenocarcinoma for which she received one chemotherapy treatment  Pt was admitted to [place] [address] on 7/23 with N/V, and SOB  She was found to have an elevated troponin, BNP, and D-dimer  Given her TOMER, she was unable to undergo a CTA of her chest so a heparin infusion was ordered empirically until a VQ scan could be completed to assess for PE  A rapid response was called around 12:46am on Saturday the 24th When SLIM entered the room, it was found that she was pulseless so CPR and ACLS protocol were initiated  Pt with copious amounts of blood coming from mouth and nose so code crimson was called given that she was on the heparin infusion  Pads were placed on her and it was ntoed that she was in ventricular fibrillation, and she was shocked x1 with ROSC  Code crimson was aborted once labs came back and were hgb was stable  Pt was moved to the ICU where she bradyed down and lost pulses again  2 more rounds of ACLS protocol with bicarb, amiodarone, and calcium were given with ROSC  Her  had arrived and I had an extensive discussion with her regarding her probable diagnosis, her cardiac arrest, her current course, and her prognosis  He elected that he would not want her to undergo CPR again and made her a level 2 DNR        History obtained from chart review and the patient   -------------------------------------------------------------------------------------------------------------  Assessment and Plan:    Neuro:    Diagnosis: Acute Toxic Metabolic Encephalopathy  o Plan: Secondary to cardiac arrest  o Pt will need CT head when stable enough to travel   o Continue fentanyl infusion for pain control/sedation   o Discussed need for TTM, given patient is overbreathing ventilator, had cough/gag, +pupillary response and risk for worsening coagulopathy in the setting of bleeding on heparin infusion it was decided to hold off for now - schedule tylenol 650mg q8 hours and pack in ice to aggressively prevent fever  o Consider EEG   o CAM ICU  o Delirium precautions  o Regulate sleep/wake cycle     CV:    Diagnosis: Cardiac Arrest  o Plan: Pt found unresponsive in bed without pulse, ACLS protocol initiated  Once pads were placed on patient it was found she was in vfib - 1 shock with ROSC   Pt with large amount blood coming from mouth and nose, approximately 250mL of blood suctioned during intubation   o Repeat EKG post cardiac arrest with new right bundle branch block  o Continue amiodarone infusion at 1 for now  o Trend troponin   o Holding on heparin infusion for now given large amounts of bleeding, restart as appropriate  o CT PE once patient stable for scan   Diagnosis: Shock   o Plan: Undifferentiated - likely cardiogenic given high likelihood of pulmonary emboli and associated right heart strain, and cardiac arrest with CPR, also possible to develop sepsis given aspiration event during code, and possible UTI for which she was being treated prior to this event  o Was given 3L crystalloid and 500mL albumin with improvement in blood pressures from 56'Z systolic to 737'X systolic   o Continue pressor support with epi, levophed, and vasopressin - wean as tolerated (vaso currently off)   Diagnosis: Hx: HTN, HLD  o Plan: Currently holding PTA Norvasc, Lipitor, HCTZ, and Cozaar - restart as appropriate    Pulm:   Diagnosis: Acute Hypoxic Respiratory Failure   o Plan: Likely 2/2 to pulmonary emboli - pt presented with N/V, and SOB; given TOMER she was treated empirically with heparin infusion  o VQ scan ordered for this AM  o Continue mechanical ventilation with AC/VC 18/440/100/10; IBW: 54 7   Diagnosis: Hx: Asthma   o Plan: Pt uses claritin and flonase at home, currently hold  o Respiratory protocol    GI:    Diagnosis: N/V  o Plan: No N/V since admission  o OGT in place for enteral access  o IV PPI for GI PPx    :    Diagnosis: TOMER  o Plan: Likely 2/2 to decreased perfursion  o Trend labs and replace electrolytes PRN   Diagnosis: HAGMA, Lactic Acidosis  o Plan: Secondary to cardiac arrest  o IVF, trend labs    Heme/Onc:    Diagnosis: Adenocarcinoma of the Colon   o Plan: Recently diagnosed during routine colonoscopy and polyp biopsy  o Pt recently started chemotherapy and has only had one treatment    Diagnosis: MTP  o Plan: Code Crimson activated during cardiac arrest given large amounts of blood coming from patient and the fact that she was on a heparin infusion   o She received 1 unit of pRBC's, and 1 unit of FFP prior to labs returning and code crimson being aborted     Endo:    Diagnosis: No acute issues    ID:    Diagnosis: Leukocytosis  o Plan: Pt with elevated WBC, and bandemia post cardiac arrest  o Pt aspirated large amounts of blood during cardiac arrest, will broaden out antibiotics to cefepime, and vancomycin pending culture data and procalcitonin  o Pt was receiving Rocephin for possible UTI, D/C      MSK/Skin:    Diagnosis: Likely Rib Fx  o Plan: 2/2 to CPR  o Will need PT/OT  o Frequent turns and repositioning    Disposition: Transfer to Critical Care   Code Status: Level 2 - DNAR: but accepts endotracheal intubation  --------------------------------------------------------------------------------------------------------------  Review of Systems   Unable to perform ROS: Intubated     Review of systems was unable to be performed secondary to intubation    Physical Exam  Vitals and nursing note reviewed     Constitutional:       General: She is in acute distress  Appearance: She is ill-appearing  Interventions: She is intubated  HENT:      Head: Normocephalic and atraumatic  Mouth/Throat:      Mouth: Mucous membranes are moist       Pharynx: Oropharynx is clear  Eyes:      Conjunctiva/sclera: Conjunctivae normal       Pupils: Pupils are equal, round, and reactive to light  Cardiovascular:      Rate and Rhythm: Tachycardia present  Rhythm irregular  Pulmonary:      Effort: She is intubated  Breath sounds: Decreased breath sounds present  Comments: Coarse breath sounds bilaterally  Abdominal:      General: There is no distension  Palpations: Abdomen is soft  Tenderness: There is no abdominal tenderness  Musculoskeletal:      Cervical back: Neck supple  Skin:     General: Skin is warm and dry  Neurological:      Mental Status: She is unresponsive  GCS: GCS eye subscore is 1  GCS verbal subscore is 1  GCS motor subscore is 1  Comments: +overbreathing the ventilator, +pupillary response, +corneals, +cough/gag, no response to painful stimuli      --------------------------------------------------------------------------------------------------------------  Vitals:   Vitals:    07/24/21 0135 07/24/21 0200 07/24/21 0210 07/24/21 0358   BP:       BP Location:       Pulse:       Resp:       Temp:  (!) 96 1 °F (35 6 °C) (!) 96 8 °F (36 °C)    TempSrc:  Axillary Axillary    SpO2: 93%   (!) 70%   Weight:       Height:         Temp  Min: 96 1 °F (35 6 °C)  Max: 98 8 °F (37 1 °C)  IBW (Ideal Body Weight): 54 7 kg  Height: 5' 4" (162 6 cm)  Body mass index is 27 32 kg/m²      Laboratory and Diagnostics:  Results from last 7 days   Lab Units 07/24/21  0351 07/24/21  0344 07/24/21  0240 07/24/21  0125 07/23/21  1122 07/19/21  0926   WBC Thousand/uL  --   --  22 93*  --  7 66 6 88   HEMOGLOBIN g/dL  --  11 2* 11 4*  --  12 7 12 9   I STAT HEMOGLOBIN g/dl 11 2* 10 9*  --  12 9  --   --    HEMATOCRIT %  --   --  37 2  --  39 7 41 5   HEMATOCRIT, ISTAT % 33* 32*  --  38  --   --    PLATELETS Thousands/uL  --   --  214  --  215 234   NEUTROS PCT %  --   --   --   --  76* 55   BANDS PCT %  --   --  31*  --   --   --    MONOS PCT %  --   --   --   --  7 10   MONO PCT %  --   --  2*  --   --   --      Results from last 7 days   Lab Units 07/24/21  0351 07/24/21  0344 07/24/21  0240 07/24/21  0125 07/24/21  0112 07/23/21  1122 07/19/21  0926   SODIUM mmol/L  --   --  144  --  136 135* 137   POTASSIUM mmol/L  --   --  3 7  --  4 6 3 4* 3 7   CHLORIDE mmol/L  --   --  103  --  102 98* 104   CO2 mmol/L  --   --  23  --  23 20* 27   CO2, I-STAT mmol/L 23 22  --  19*  --   --   --    ANION GAP mmol/L  --   --  18*  --  11 17* 6   BUN mg/dL  --   --  31*  --  31* 30* 12   CREATININE mg/dL  --   --  2 02*  --  1 64* 1 62* 1 02   CALCIUM mg/dL  --   --  8 4  --  15 7* 9 3 9 9   GLUCOSE RANDOM mg/dL  --   --  324*  --  209* 136 95   ALT U/L  --   --  98*  --   --  38 30   AST U/L  --   --  114*  --   --  30 18   ALK PHOS U/L  --   --  103  --   --  60 82   ALBUMIN g/dL  --   --  1 9*  --   --  3 5 3 5   TOTAL BILIRUBIN mg/dL  --   --  0 21  --   --  0 60 0 42     Results from last 7 days   Lab Units 07/24/21  0240   MAGNESIUM mg/dL 2 3   PHOSPHORUS mg/dL 9 6*      Results from last 7 days   Lab Units 07/24/21  0240 07/23/21  1921 07/23/21  1122   INR  1 48*  --  0 96   PTT seconds 71* 205* 26      Results from last 7 days   Lab Units 07/24/21  0240 07/23/21  1437 07/23/21  1122   TROPONIN I ng/mL 0 85* 0 49* 0 52*     Results from last 7 days   Lab Units 07/24/21  0240 07/24/21  0112   LACTIC ACID mmol/L 12 4* 13 3*     EKG: Telemetry reviewed  Imaging: I have personally reviewed pertinent reports     and I have personally reviewed pertinent films in PACS    Historical Information   Past Medical History:   Diagnosis Date    Allergic rhinitis     last assessed: 12/12/2012    Asthma     [facility] [address] )     Dysuria 5/14/2020    Hyperlipidemia  Hypertension     Hypokalemia     last assessed: 2012    Urine frequency 2016     Past Surgical History:   Procedure Laterality Date    COLON SURGERY      COLONOSCOPY  2009    COLPOSCOPY      DIAGNOSTIC LAPAROSCOPY      IR PORT PLACEMENT  2021    FL LAP,SURG,COLECTOMY, PARTIAL, W/ANAST N/A 6/3/2021    Procedure: RESECTION COLON LEFT LAPAROSCOPIC;  Surgeon: Barbara Villar MD;  Location: [facility];  Service: Colorectal    FL SIGMOIDOSCOPY FLX DX W/COLLJ SPEC BR/WA IF PFRMD N/A 6/3/2021    Procedure: Ramos Viramontes;  Surgeon: Barbara Villar MD;  [facility];  Service: Colorectal    TUBAL LIGATION       Social History   Social History     Substance and Sexual Activity   Alcohol Use Yes    Alcohol/week: 2 0 standard drinks    Types: 2 Glasses of wine per week    Comment: socially      Social History     Substance and Sexual Activity   Drug Use No     Social History     Tobacco Use   Smoking Status Former Smoker    Quit date:     Years since quittin 5   Smokeless Tobacco Never Used     Exercise History: Independent with ADL's  Family History:   Family History   Problem Relation Age of Onset    Lung cancer Mother 80    Asthma Family     Coronary artery disease Family     Thyroid disease Family     No Known Problems Father     No Known Problems Daughter     No Known Problems Maternal Grandmother     Cancer Maternal Grandfather         unknown type/age    No Known Problems Paternal Grandmother     No Known Problems Paternal Grandfather     No Known Problems Son     No Known Problems Maternal Aunt     No Known Problems Paternal Aunt      I have reviewed this patient's family history and commented on sigificant items within the HPI    Medications:  Current Facility-Administered Medications   Medication Dose Route Frequency    acetaminophen (TYLENOL) tablet 650 mg  650 mg Oral Q6H PRN    amiodarone (CORDARONE) 900 mg in dextrose 5 % 500 mL (NORVASC) 2 5 mg tablet 7/22/2021 at Unknown time  No Yes   Sig: TAKE 1 TABLET BY MOUTH EVERY DAY   amLODIPine (NORVASC) 5 mg tablet 7/22/2021 at Unknown time  No Yes   Sig: TAKE 1 TABLET DAILY ALONG WITH A 2 5 MG TABLET   atorvastatin (LIPITOR) 40 mg tablet 7/22/2021 at Unknown time Self No Yes   Sig: TAKE 1 TABLET BY MOUTH EVERY DAY   Patient taking differently: every evening    fluorouracil 4,030 mg in CADD infusion pump   No No   Sig: Infuse 4,030 mg (1,200 mg/m2/day x 1 68 m2 (Treatment Plan Recorded BSA)) into a venous catheter over 46 hours for 2 days   fluticasone (FLONASE) 50 mcg/act nasal spray 7/22/2021 at Unknown time  No Yes   Sig: SPRAY 1 SPRAY INTO EACH NOSTRIL EVERY DAY   hydrochlorothiazide (HYDRODIURIL) 25 mg tablet 7/23/2021 at Unknown time  No Yes   Sig: TAKE 1 TABLET BY MOUTH EVERY DAY   loratadine (CLARITIN) 10 mg tablet 7/23/2021 at Unknown time Self Yes Yes   Sig: Take 1 tablet by mouth daily as needed   losartan (COZAAR) 100 MG tablet 7/23/2021 at Unknown time  No Yes   Sig: TAKE 1 TABLET BY MOUTH EVERY DAY   montelukast (SINGULAIR) 10 mg tablet 7/22/2021 at Unknown time  No Yes   Sig: TAKE 1 TABLET AT BEDTIME    prochlorperazine (COMPAZINE) 10 mg tablet 7/23/2021 at Unknown time  No Yes   Sig: Take 1 tablet (10 mg total) by mouth every 6 (six) hours as needed for nausea or vomiting      Facility-Administered Medications: None     Allergies:   Allergies   Allergen Reactions    Sulfa Antibiotics Other (See Comments) and Hives     Black spots under skin    Amoxicillin GI Intolerance     Only if she takes it on an empty stomach, she tolerates it if she takes it with food     ------------------------------------------------------------------------------------------------------------  Anticipated Length of Stay is > 2 midnights    Care Time Delivered:   Upon my evaluation, this patient had a high probability of imminent or life-threatening deterioration due to cardiopulmonary arrest, probable pulmonary emboli and right heart strain, which required my direct attention, intervention, and personal management  I have personally provided 120 minutes (0050 to 0430) of critical care time, exclusive of procedures, teaching, family meetings, and any prior time recorded by providers other than myself  Mayra Hsu PA-C    Portions of the record may have been created with voice recognition software  Occasional wrong word or "sound a like" substitutions may have occurred due to the inherent limitations of voice recognition software    Read the chart carefully and recognize, using context, where substitutions have occurred

## 2021-07-24 NOTE — PROCEDURES
Intubation    Date/Time: 7/24/2021 5:53 AM  Performed by: Yina Justin PA-C  Authorized by: Yina Justin PA-C     Patient location:  Bedside  Consent:     Consent obtained:  Emergent situation  Pre-procedure details:     Patient status:  Unresponsive    Pretreatment medications:  None    Paralytics:  None  Indications:     Indications for intubation: respiratory distress and respiratory failure      Indications for intubation comment:  Cardiac arrest  Procedure details:     Preoxygenation:  Bag valve mask    CPR in progress: yes      Intubation method:  Oral    Oral intubation technique:  Glidescope    Laryngoscope blade: Mac 3    Tube size (mm):  7 5    Tube type:  Hi-lo    Number of attempts:  2    Ventilation between attempts: yes      Cricoid pressure: yes      Tube visualized through cords: yes    Placement assessment:     Tube secured with:  ETT haile    Breath sounds:  Equal    Placement verification: chest rise, CXR verification and equal breath sounds      CXR findings:  ETT in proper place  Post-procedure details:     Patient tolerance of procedure:   Tolerated well, no immediate complications

## 2021-07-24 NOTE — ASSESSMENT & PLAN NOTE
Patient was recently diagnosed with adenocarcinoma of the colon after a polyp was biopsied during colonoscopy  Her stage is stage III T4 N1 with 2/35 lymph nodes positive and very close margin to the anterior wall but with negative margins reported  Patient was started on FOLFOX 6 treatment for the next 6 months earlier in the week and presents with worsening nausea and vomiting evidence for dehydration and acute kidney injury  Patient also reported shortness of breath and does appear to have elevated BNP    With a slight bump in troponin  · Plan as above to check 2D echo  · Suspecting elevation of troponins in the context of dehydration and nausea and vomiting

## 2021-07-24 NOTE — ED NOTES
Pt PTT resulted at 205 seconds  Heparin will be held for one hour, will be decreased by 3 units and restarted at 9pm (2100) per protocol        Umang Cody RN  07/23/21 2006

## 2021-07-24 NOTE — ASSESSMENT & PLAN NOTE
Patient with a known history of hypertension  Presented with dehydration and low blood pressure in the context of recent chemotherapy with nausea and vomiting    · Hold losartan, hold hydrochlorothiazide  · Continue Norvasc with hold parameters
